# Patient Record
Sex: FEMALE | Race: WHITE | NOT HISPANIC OR LATINO | Employment: OTHER | URBAN - METROPOLITAN AREA
[De-identification: names, ages, dates, MRNs, and addresses within clinical notes are randomized per-mention and may not be internally consistent; named-entity substitution may affect disease eponyms.]

---

## 2017-01-05 PROBLEM — R60.9 EDEMA: Status: ACTIVE | Noted: 2017-01-05

## 2017-01-05 PROBLEM — I71.20 ANEURYSM OF THORACIC AORTA (HCC): Status: ACTIVE | Noted: 2017-01-05

## 2017-01-05 PROBLEM — N19 RENAL FAILURE: Status: ACTIVE | Noted: 2017-01-05

## 2017-01-05 PROBLEM — R53.83 FATIGUE: Status: ACTIVE | Noted: 2017-01-05

## 2017-01-05 PROBLEM — R94.31 ABNORMAL ELECTROCARDIOGRAM: Status: ACTIVE | Noted: 2017-01-05

## 2017-01-05 PROBLEM — R10.9 ABDOMINAL PAIN: Status: ACTIVE | Noted: 2017-01-05

## 2017-01-17 ENCOUNTER — APPOINTMENT (OUTPATIENT)
Dept: WOMENS IMAGING | Facility: HOSPITAL | Age: 72
End: 2017-01-17

## 2017-01-17 PROCEDURE — 77063 BREAST TOMOSYNTHESIS BI: CPT | Performed by: RADIOLOGY

## 2017-01-17 PROCEDURE — G0202 SCR MAMMO BI INCL CAD: HCPCS | Performed by: RADIOLOGY

## 2017-01-25 ENCOUNTER — OFFICE VISIT (OUTPATIENT)
Dept: CARDIOLOGY | Facility: CLINIC | Age: 72
End: 2017-01-25

## 2017-01-25 VITALS
BODY MASS INDEX: 32.79 KG/M2 | DIASTOLIC BLOOD PRESSURE: 70 MMHG | HEART RATE: 62 BPM | SYSTOLIC BLOOD PRESSURE: 128 MMHG | HEIGHT: 57 IN | WEIGHT: 152 LBS

## 2017-01-25 DIAGNOSIS — I10 ESSENTIAL HYPERTENSION: ICD-10-CM

## 2017-01-25 DIAGNOSIS — I71.20 THORACIC AORTIC ANEURYSM, WITHOUT RUPTURE: Primary | ICD-10-CM

## 2017-01-25 PROCEDURE — 93000 ELECTROCARDIOGRAM COMPLETE: CPT | Performed by: INTERNAL MEDICINE

## 2017-01-25 PROCEDURE — 99213 OFFICE O/P EST LOW 20 MIN: CPT | Performed by: INTERNAL MEDICINE

## 2017-01-25 NOTE — MR AVS SNAPSHOT
Bird MOLINA French   1/25/2017 11:20 AM   Office Visit    Dept Phone:  [phone]   Encounter #:  [id number]    Provider:  Giulia Squires MD   Department:  [facility] CARDIOLOG[facility]                Your Full Care Plan              Today's Medication Changes          These changes are accurate as of: 1/25/17 12:26 PM.  If you have any questions, ask your nurse or doctor.               Medication(s)that have changed:     CYMBALTA 60 MG capsule   Generic drug:  DULoxetine   Take 1 capsule by mouth.   What changed:  Another medication with the same name was removed. Continue taking this medication, and follow the directions you see here.   Changed by:  Costa South MD         Stop taking medication(s)listed here:     tamoxifen 20 MG chemo tablet   Commonly known as:  NOLVADEX   Stopped by:  Giulia Squires MD                      Your Updated Medication List          This list is accurate as of: 1/25/17 12:26 PM.  Always use your most recent med list.                amitriptyline 25 MG tablet   Commonly known as:  ELAVIL   TAKE ONE TABLET BY MOUTH AT BEDTIME AS NEEDED FOR SLEEP       aspirin 325 MG EC tablet       BENADRYL 25 MG tablet   Generic drug:  diphenhydrAMINE       CALTRATE 600 1500 (600 CA) MG tablet   Generic drug:  Calcium Carbonate       CoQ10 200 MG capsule       CYMBALTA 60 MG capsule   Generic drug:  DULoxetine       hydrochlorothiazide 12.5 MG tablet   Commonly known as:  HYDRODIURIL   Take 1 tablet by mouth daily.       HYDROcodone-acetaminophen  MG per tablet   Commonly known as:  NORCO   Take 1 tablet by mouth Every 6 (Six) Hours As Needed for moderate pain (4-6).       levothyroxine 125 MCG tablet   Commonly known as:  SYNTHROID, LEVOTHROID   Take 1 tablet by mouth daily.       LIVALO 2 MG tablet tablet   Generic drug:  pitavastatin calcium       metoprolol tartrate 25 MG tablet   Commonly known as:  LOPRESSOR   Take 1 tablet by mouth 2 (two) times a  "day.       Multi Vitamin tablet       omeprazole 20 MG capsule   Commonly known as:  priLOSEC   Take 1 capsule by mouth as needed (reflux).               We Performed the Following     CT chest wo contrast       You Were Diagnosed With        Codes Comments    Thoracic aortic aneurysm, without rupture    -  Primary ICD-10-CM: I71.2  ICD-9-CM: 441.2       Instructions     None    Patient Instructions History      Upcoming Appointments     Visit Type Date Time Department    FOLLOW UP 1/25/2017 11:20 AM [facility]    FOLLOW UP 2/28/2017 10:20 AM [facility]      [employer] Signup     Our records indicate that you have an active [employer] account.    You can view your After Visit Summary by going to [employer] and logging in with your [employer] username and password.  If you don't have a [employer] username and password but a parent or guardian has access to your record, the parent or guardian should login with their own [employer] username and password and access your record to view the After Visit Summary.    If you have questions, you can email [email or web address] or call [phone] to talk to our [employer] staff.  Remember, [employer] is NOT to be used for urgent needs.  For medical emergencies, dial 911.               Other Info from Your Visit           Your Appointments     Feb 28, 2017 10:20 AM EST   Follow Up with Costa South MD   [facility] (--)    [address]  [place] [address] [phone]           Arrive 15 minutes prior to appointment.              Allergies     No Known Allergies      Reason for Visit     Palpitations 1 year followup    Thoracic Aortic Aneurysm           Vital Signs     Blood Pressure Pulse Height Weight Body Mass Index Smoking Status    128/70 62 57\" (144.8 cm) 152 lb (68.9 kg) 32.89 kg/m2 Never Smoker      Problems and Diagnoses Noted     Aneurysm    -  Primary        "

## 2017-01-25 NOTE — PROGRESS NOTES
Date of Office Visit: 2017  Encounter Provider: Giulia Squires MD  Place of Service: [facility] CARDIOLOGY  Patient Name: Bird Spear  :1945    Chief complaint  Followup of thoracic aortic aneurysm and palpitations    History of Present Illness  Patient is a pleasant 71-year-old female with history of hypertension, hyperlipidemia, renal insufficiency, hypothyroidism.  In  she was found to have a mildly dilated aorta.  This is been followed with serial imaging studies.  She had an echocardiogram in 2016 that showed normal systolic function no significant valvular disease.  She had a nuclear stress test in 2014 that was negative for ischemia.  She had a noncontrast CT of her chest in 2015 that showed a slight increase in the ascending thoracic aortic size forms 3.7-3.9 cm.  Repeat CT imaging genera  with a noncontrast study again revealed a stable ectasia of the asymmetry thoracic aorta measuring 3.9 cm.  There was also some hepatic cyst and possible small hepatic hemangioma.    Last visit she states after stopping tamoxifen her palpitations and fatigue resolved completely.  She denies any chest pain palpitations syncope near syncope.  Her blood pressure has been with systolics in the 110s to 120s occasionally in the 130s diastolic 60s to 70s she is walking or riding a bicycle 45 times a week for 20 minutes.  Her breast cancer salt to be stable.    Past Medical History   Diagnosis Date   • Abdominal pain    • Abnormal electrocardiogram    • Arthritis 2016   • Chronic pain syndrome 2016   • Depression    • Edema    • Essential hypertension 2016   • Fatigue    • Hx of radiation therapy    • Hyperlipidemia 2016   • Hypertension    • Hypothyroidism    • Hypothyroidism (acquired) 2016   • Malignant neoplasm of breast    • Renal failure    • Thoracic aortic aneurysm    • Thyroid disease      Past Surgical History   Procedure  Laterality Date   • Spinal fusion     • Breast lumpectomy Right    • Hysterectomy     •  section       Outpatient Medications Prior to Visit   Medication Sig Dispense Refill   • amitriptyline (ELAVIL) 25 MG tablet TAKE ONE TABLET BY MOUTH AT BEDTIME AS NEEDED FOR SLEEP 90 tablet 0   • aspirin 325 MG EC tablet Take 325 mg by mouth Daily.     • Calcium Carbonate (CALTRATE 600) 1500 (600 CA) MG tablet Take  by mouth.     • Coenzyme Q10 (COQ10) 200 MG capsule Take 1 capsule by mouth Daily.     • diphenhydrAMINE (BENADRYL) 25 MG tablet Take 25 mg by mouth Daily.     • DULoxetine (CYMBALTA) 60 MG capsule Take 1 capsule by mouth.     • hydrochlorothiazide (HYDRODIURIL) 12.5 MG tablet Take 1 tablet by mouth daily. 90 tablet 3   • HYDROcodone-acetaminophen (NORCO)  MG per tablet Take 1 tablet by mouth Every 6 (Six) Hours As Needed for moderate pain (4-6). 120 tablet 0   • levothyroxine (SYNTHROID, LEVOTHROID) 125 MCG tablet Take 1 tablet by mouth daily. 90 tablet 3   • metoprolol tartrate (LOPRESSOR) 25 MG tablet Take 1 tablet by mouth 2 (two) times a day. 180 tablet 2   • Multiple Vitamin (MULTI VITAMIN) tablet Take  by mouth.     • omeprazole (PriLOSEC) 20 MG capsule Take 1 capsule by mouth as needed (reflux). 90 capsule 2   • pitavastatin calcium (LIVALO) 2 MG tablet tablet Take 2 mg by mouth every night.     • DULoxetine (CYMBALTA) 60 MG capsule Take 1 capsule by mouth daily. 90 capsule 2   • tamoxifen (NOLVADEX) 20 MG chemo tablet Take 1 tablet by mouth Daily.       No facility-administered medications prior to visit.      Allergies as of 2017   • (No Known Allergies)     Social History     Social History   • Marital status:      Spouse name: N/A   • Number of children: N/A   • Years of education: N/A     Occupational History   • Not on file.     Social History Main Topics   • Smoking status: Never Smoker   • Smokeless tobacco: Not on file   • Alcohol use No      Comment: caffeine use   • Drug  "use: Not on file   • Sexual activity: Not on file     Other Topics Concern   • Not on file     Social History Narrative     Family History   Problem Relation Age of Onset   • Heart disease Mother    • Hyperlipidemia Mother    • Diabetes Mother    • Hypertension Mother    • Heart disease Father      Coronary artery disease   • Hyperlipidemia Father    • Hypertension Father    • Heart disease Sister    • Hypertension Other      Review of Systems   Constitution: Negative for fever, malaise/fatigue, weight gain and weight loss.   HENT: Negative for ear pain, hearing loss, nosebleeds and sore throat.    Eyes: Negative for double vision, pain, vision loss in left eye and vision loss in right eye.   Cardiovascular:        See history of present illness.   Respiratory: Negative for cough, shortness of breath, sleep disturbances due to breathing, snoring and wheezing.    Endocrine: Negative for cold intolerance, heat intolerance and polyuria.   Skin: Negative for itching, poor wound healing and rash.   Musculoskeletal: Positive for joint pain and myalgias. Negative for joint swelling.   Gastrointestinal: Negative for abdominal pain, diarrhea, hematochezia, nausea and vomiting.   Genitourinary: Negative for hematuria and hesitancy.   Neurological: Negative for numbness, paresthesias and seizures.   Psychiatric/Behavioral: Positive for depression. The patient is not nervous/anxious.      Objective:     Vitals:    01/25/17 1133   BP: 128/70   Pulse: 62   Weight: 152 lb (68.9 kg)   Height: 57\" (144.8 cm)     Body mass index is 32.89 kg/(m^2).    Physical Exam   Constitutional: She is oriented to person, place, and time. She appears well-developed and well-nourished.   Obese   HENT:   Head: Normocephalic.   Nose: Nose normal.   Mouth/Throat: Oropharynx is clear and moist.   Eyes: Conjunctivae and EOM are normal. Pupils are equal, round, and reactive to light. Right eye exhibits no discharge. No scleral icterus.   Neck: Normal " range of motion. Neck supple. No JVD present. No thyromegaly present.   Cardiovascular: Normal rate, regular rhythm, normal heart sounds and intact distal pulses.  Exam reveals no gallop and no friction rub.    No murmur heard.  Pulses:       Carotid pulses are 2+ on the right side, and 2+ on the left side.       Radial pulses are 2+ on the right side, and 2+ on the left side.        Femoral pulses are 2+ on the right side, and 2+ on the left side.       Popliteal pulses are 2+ on the right side, and 2+ on the left side.        Dorsalis pedis pulses are 2+ on the right side, and 2+ on the left side.        Posterior tibial pulses are 2+ on the right side, and 2+ on the left side.   Pulmonary/Chest: Effort normal and breath sounds normal. No respiratory distress. She has no wheezes. She has no rales.   Abdominal: Soft. Bowel sounds are normal. She exhibits no distension. There is no hepatosplenomegaly. There is no tenderness. There is no rebound.   Musculoskeletal: Normal range of motion. She exhibits no edema or tenderness.   Neurological: She is alert and oriented to person, place, and time.   Skin: Skin is warm and dry. No rash noted. No erythema.   Psychiatric: She has a normal mood and affect. Her behavior is normal. Judgment and thought content normal.   Vitals reviewed.    Lab Review:     ECG 12 Lead  Date/Time: 1/25/2017 12:55 AM  Performed by: SARAH WONG  Authorized by: SARAH WONG   Comparison: compared with previous ECG   Similar to previous ECG  Rhythm: sinus rhythm  Conduction comments: Nonspecific ST T wave changes  QTc =427 msec  Clinical impression: abnormal ECG          Assessment:       Diagnosis Plan   1. Thoracic aortic aneurysm, without rupture  CT chest wo contrast   2. Essential hypertension       Plan:       1.  Ascending thoracic aortic aneurysm with ectasia.  We'll check a noncontrast CT scan of her chest.  2.  Hypertension, controlled  3.  Breast cancer  4.  Renal insufficiency  5.  Liver  abnormalities.  Possibly cyst and hemangioma.  She states this is been there for a while.  We'll defer further imaging evaluation to Dr. South  6.  Dyslipidemia       French, Bird MOLINA   Home Medication Instructions ANDREI:    Printed on:01/30/17 4153   Medication Information                      amitriptyline (ELAVIL) 25 MG tablet  TAKE ONE TABLET BY MOUTH AT BEDTIME AS NEEDED FOR SLEEP             aspirin 325 MG EC tablet  Take 325 mg by mouth Daily.             Calcium Carbonate (CALTRATE 600) 1500 (600 CA) MG tablet  Take  by mouth.             Coenzyme Q10 (COQ10) 200 MG capsule  Take 1 capsule by mouth Daily.             diphenhydrAMINE (BENADRYL) 25 MG tablet  Take 25 mg by mouth Daily.             DULoxetine (CYMBALTA) 60 MG capsule  Take 1 capsule by mouth.             hydrochlorothiazide (HYDRODIURIL) 12.5 MG tablet  Take 1 tablet by mouth daily.             HYDROcodone-acetaminophen (NORCO)  MG per tablet  Take 1 tablet by mouth Every 6 (Six) Hours As Needed for moderate pain (4-6).             levothyroxine (SYNTHROID, LEVOTHROID) 125 MCG tablet  Take 1 tablet by mouth daily.             metoprolol tartrate (LOPRESSOR) 25 MG tablet  Take 1 tablet by mouth 2 (two) times a day.             Multiple Vitamin (MULTI VITAMIN) tablet  Take  by mouth.             omeprazole (PriLOSEC) 20 MG capsule  Take 1 capsule by mouth as needed (reflux).             pitavastatin calcium (LIVALO) 2 MG tablet tablet  Take 2 mg by mouth every night.                 EMR Dragon/Transcription disclaimer:   Much of this encounter note is an electronic transcription/translation of spoken language to printed text. The electronic translation of spoken language may permit erroneous, or at times, nonsensical words or phrases to be inadvertently transcribed; Although I have reviewed the note for such errors, some may still exist.

## 2017-01-30 PROBLEM — I71.20 THORACIC AORTIC ANEURYSM, WITHOUT RUPTURE: Status: ACTIVE | Noted: 2017-01-30

## 2017-01-31 ENCOUNTER — HOSPITAL ENCOUNTER (OUTPATIENT)
Dept: CT IMAGING | Facility: HOSPITAL | Age: 72
Discharge: HOME | End: 2017-01-31
Attending: INTERNAL MEDICINE | Admitting: INTERNAL MEDICINE

## 2017-01-31 PROCEDURE — 71250 CT THORAX DX C-: CPT

## 2017-02-01 ENCOUNTER — TELEPHONE (OUTPATIENT)
Dept: CARDIOLOGY | Facility: CLINIC | Age: 72
End: 2017-02-01

## 2017-02-28 ENCOUNTER — OFFICE VISIT (OUTPATIENT)
Dept: INTERNAL MEDICINE | Facility: CLINIC | Age: 72
End: 2017-02-28

## 2017-02-28 VITALS
RESPIRATION RATE: 16 BRPM | HEART RATE: 60 BPM | SYSTOLIC BLOOD PRESSURE: 132 MMHG | BODY MASS INDEX: 32.36 KG/M2 | DIASTOLIC BLOOD PRESSURE: 87 MMHG | WEIGHT: 150 LBS | HEIGHT: 57 IN | OXYGEN SATURATION: 98 %

## 2017-02-28 DIAGNOSIS — E78.2 MIXED HYPERLIPIDEMIA: ICD-10-CM

## 2017-02-28 DIAGNOSIS — M19.90 ARTHRITIS: ICD-10-CM

## 2017-02-28 DIAGNOSIS — G47.00 INSOMNIA, UNSPECIFIED TYPE: ICD-10-CM

## 2017-02-28 DIAGNOSIS — I10 ESSENTIAL HYPERTENSION: Primary | ICD-10-CM

## 2017-02-28 LAB
BILIRUB BLD-MCNC: NEGATIVE MG/DL
CLARITY, POC: CLEAR
COLOR UR: YELLOW
GLUCOSE UR STRIP-MCNC: NEGATIVE MG/DL
KETONES UR QL: NEGATIVE
LEUKOCYTE EST, POC: NEGATIVE
NITRITE UR-MCNC: NEGATIVE MG/ML
PH UR: 7 [PH] (ref 5–8)
PROT UR STRIP-MCNC: NEGATIVE MG/DL
RBC # UR STRIP: ABNORMAL /UL
SP GR UR: 1.02 (ref 1–1.03)
UROBILINOGEN UR QL: NORMAL

## 2017-02-28 PROCEDURE — 81003 URINALYSIS AUTO W/O SCOPE: CPT | Performed by: INTERNAL MEDICINE

## 2017-02-28 PROCEDURE — 99213 OFFICE O/P EST LOW 20 MIN: CPT | Performed by: INTERNAL MEDICINE

## 2017-02-28 RX ORDER — DULOXETIN HYDROCHLORIDE 60 MG/1
60 CAPSULE, DELAYED RELEASE ORAL DAILY
Qty: 90 CAPSULE | Refills: 2 | Status: SHIPPED | OUTPATIENT
Start: 2017-02-28 | End: 2017-11-06 | Stop reason: SDUPTHER

## 2017-02-28 RX ORDER — AMITRIPTYLINE HYDROCHLORIDE 25 MG/1
25 TABLET, FILM COATED ORAL NIGHTLY
Qty: 90 TABLET | Refills: 2 | Status: SHIPPED | OUTPATIENT
Start: 2017-02-28 | End: 2017-11-29 | Stop reason: SDUPTHER

## 2017-02-28 RX ORDER — HYDROCODONE BITARTRATE AND ACETAMINOPHEN 10; 325 MG/1; MG/1
1 TABLET ORAL EVERY 6 HOURS PRN
Qty: 120 TABLET | Refills: 0 | Status: SHIPPED | OUTPATIENT
Start: 2017-02-28 | End: 2017-05-23 | Stop reason: SDUPTHER

## 2017-02-28 NOTE — PROGRESS NOTES
Subjective   Bird Spear is a 71 y.o. female.     History of Present Illness   70 yo female with pmhx depression doing well with agitation control on her cymbalta.  Her husbands feedback is to continue cymbalta.  She is better with controlling emotion on her cymbalta.  BP log today reviewed, but getting close to 100/60 and getting very fatigued.   Taking norco - 1/2 in am and second at 2 pm.  Often does not need but a fourth prior to bed for her back pain. She has trouble getting comfortable.     Her DCIS treated in 2014.  Did not tolerate tamoxifen.      patint provided documentation today from 1993 - Dr. Nelson regarding her elavil use.     Have stopped PAP - had pelvic with lori Shelby.  Did see Dr. Squires who is monitoring her thoracic aneurysm - we discussed hepatic cyst and renal scarring, fu plans for these findings which are likely benign but need monitoring.   The following portions of the patient's history were reviewed and updated as appropriate: allergies, current medications, past family history, past medical history, past social history, past surgical history and problem list.    Review of Systems   Constitutional: Negative for appetite change, fatigue, fever and unexpected weight change.   HENT: Negative for sinus pressure and trouble swallowing.    Respiratory: Negative for cough and chest tightness.    Cardiovascular: Negative for chest pain, palpitations and leg swelling.   Gastrointestinal: Negative for constipation and diarrhea.   Genitourinary: Negative for dysuria, frequency, hematuria, pelvic pain and vaginal discharge.   Musculoskeletal: Negative.    Skin: Negative.    Neurological: Negative for dizziness, light-headedness and headaches.   Hematological: Negative.    Psychiatric/Behavioral: Negative.        Objective   Physical Exam   Constitutional: She is oriented to person, place, and time. She appears well-developed and well-nourished.   HENT:   Head: Normocephalic and atraumatic.    Right Ear: External ear normal.   Left Ear: External ear normal.   Eyes: EOM are normal. Pupils are equal, round, and reactive to light.   Neck: Normal range of motion. Neck supple. No JVD present. No tracheal deviation present. No thyromegaly present.   Cardiovascular: Normal rate, regular rhythm and normal heart sounds.  Exam reveals no friction rub.    No murmur heard.  Pulmonary/Chest: Effort normal and breath sounds normal.   Lymphadenopathy:     She has no cervical adenopathy.   Neurological: She is alert and oriented to person, place, and time.   Skin: Skin is warm and dry.   Psychiatric: She has a normal mood and affect. Her behavior is normal.   Vitals reviewed.    UA today  Assessment/Plan      Bird was seen today for pain.    Diagnoses and all orders for this visit:    Essential hypertension    Arthritis  -     HYDROcodone-acetaminophen (NORCO)  MG per tablet; Take 1 tablet by mouth Every 6 (Six) Hours As Needed for moderate pain (4-6).  -     DULoxetine (CYMBALTA) 60 MG capsule; Take 1 capsule by mouth Daily.    Mixed hyperlipidemia    Insomnia, unspecified type  -     amitriptyline (ELAVIL) 25 MG tablet; Take 1 tablet by mouth Every Night.    mike and contract in chart.  Decrease metoprolol to 25 mg in morning and 12.5 mg at night      HM - GYN with  with Anuel  Renal cyst/liver cyst- UA today - 6 months renal ultraound, liver ultrasound for surveillance.     Insomnia - continue elavil.    FU with labs next visit.

## 2017-05-23 ENCOUNTER — OFFICE VISIT (OUTPATIENT)
Dept: INTERNAL MEDICINE | Facility: CLINIC | Age: 72
End: 2017-05-23

## 2017-05-23 VITALS
WEIGHT: 150.8 LBS | HEIGHT: 57 IN | BODY MASS INDEX: 32.53 KG/M2 | HEART RATE: 62 BPM | DIASTOLIC BLOOD PRESSURE: 90 MMHG | OXYGEN SATURATION: 97 % | SYSTOLIC BLOOD PRESSURE: 122 MMHG

## 2017-05-23 DIAGNOSIS — J40 BRONCHITIS: Primary | ICD-10-CM

## 2017-05-23 DIAGNOSIS — M19.90 ARTHRITIS: ICD-10-CM

## 2017-05-23 DIAGNOSIS — M48.061 SPINAL STENOSIS OF LUMBAR REGION: ICD-10-CM

## 2017-05-23 PROCEDURE — 94010 BREATHING CAPACITY TEST: CPT | Performed by: INTERNAL MEDICINE

## 2017-05-23 PROCEDURE — 99214 OFFICE O/P EST MOD 30 MIN: CPT | Performed by: INTERNAL MEDICINE

## 2017-05-23 RX ORDER — PREDNISONE 20 MG/1
20 TABLET ORAL DAILY
Qty: 6 TABLET | Refills: 0 | Status: SHIPPED | OUTPATIENT
Start: 2017-05-23 | End: 2017-05-23 | Stop reason: SDUPTHER

## 2017-05-23 RX ORDER — HYDROCODONE BITARTRATE AND ACETAMINOPHEN 10; 325 MG/1; MG/1
1 TABLET ORAL EVERY 6 HOURS PRN
Qty: 120 TABLET | Refills: 0 | Status: SHIPPED | OUTPATIENT
Start: 2017-05-23 | End: 2017-08-23 | Stop reason: SDUPTHER

## 2017-05-23 RX ORDER — AZITHROMYCIN 250 MG/1
TABLET, FILM COATED ORAL
Qty: 6 TABLET | Refills: 0 | Status: SHIPPED | OUTPATIENT
Start: 2017-05-23 | End: 2017-08-23

## 2017-05-23 RX ORDER — AZITHROMYCIN 250 MG/1
TABLET, FILM COATED ORAL
Qty: 6 TABLET | Refills: 0 | Status: SHIPPED | OUTPATIENT
Start: 2017-05-23 | End: 2017-05-23 | Stop reason: SDUPTHER

## 2017-05-23 RX ORDER — PREDNISONE 20 MG/1
20 TABLET ORAL DAILY
Qty: 6 TABLET | Refills: 0 | Status: SHIPPED | OUTPATIENT
Start: 2017-05-23 | End: 2017-11-29

## 2017-05-30 DIAGNOSIS — E03.9 HYPOTHYROIDISM (ACQUIRED): ICD-10-CM

## 2017-05-30 DIAGNOSIS — I10 ESSENTIAL HYPERTENSION: ICD-10-CM

## 2017-05-30 RX ORDER — LEVOTHYROXINE SODIUM 0.12 MG/1
TABLET ORAL
Qty: 90 TABLET | Refills: 1 | Status: SHIPPED | OUTPATIENT
Start: 2017-05-30 | End: 2017-11-29 | Stop reason: SDUPTHER

## 2017-05-30 RX ORDER — HYDROCHLOROTHIAZIDE 12.5 MG/1
12.5 TABLET ORAL DAILY
Qty: 90 TABLET | Refills: 3 | Status: SHIPPED | OUTPATIENT
Start: 2017-05-30 | End: 2017-11-29 | Stop reason: SDUPTHER

## 2017-05-30 RX ORDER — HYDROCHLOROTHIAZIDE 12.5 MG/1
CAPSULE, GELATIN COATED ORAL
Qty: 90 CAPSULE | Refills: 1 | Status: SHIPPED | OUTPATIENT
Start: 2017-05-30 | End: 2017-05-30

## 2017-05-30 RX ORDER — LEVOTHYROXINE SODIUM 0.12 MG/1
TABLET ORAL
Qty: 90 TABLET | Refills: 1 | Status: SHIPPED | OUTPATIENT
Start: 2017-05-30 | End: 2017-05-30 | Stop reason: SDUPTHER

## 2017-08-23 ENCOUNTER — OFFICE VISIT (OUTPATIENT)
Dept: INTERNAL MEDICINE | Facility: CLINIC | Age: 72
End: 2017-08-23

## 2017-08-23 VITALS
WEIGHT: 149 LBS | HEART RATE: 63 BPM | RESPIRATION RATE: 16 BRPM | OXYGEN SATURATION: 98 % | BODY MASS INDEX: 32.15 KG/M2 | HEIGHT: 57 IN | DIASTOLIC BLOOD PRESSURE: 84 MMHG | SYSTOLIC BLOOD PRESSURE: 136 MMHG

## 2017-08-23 DIAGNOSIS — I71.20 THORACIC AORTIC ANEURYSM WITHOUT RUPTURE (HCC): ICD-10-CM

## 2017-08-23 DIAGNOSIS — M19.90 ARTHRITIS: ICD-10-CM

## 2017-08-23 DIAGNOSIS — E03.9 HYPOTHYROIDISM, UNSPECIFIED TYPE: ICD-10-CM

## 2017-08-23 DIAGNOSIS — R31.9 HEMATURIA: ICD-10-CM

## 2017-08-23 DIAGNOSIS — E78.2 MIXED HYPERLIPIDEMIA: Primary | ICD-10-CM

## 2017-08-23 DIAGNOSIS — I10 ESSENTIAL HYPERTENSION: ICD-10-CM

## 2017-08-23 DIAGNOSIS — G47.00 INSOMNIA, UNSPECIFIED TYPE: ICD-10-CM

## 2017-08-23 DIAGNOSIS — M48.061 SPINAL STENOSIS OF LUMBAR REGION: ICD-10-CM

## 2017-08-23 LAB
BASOPHILS # BLD AUTO: 0.03 10*3/MM3 (ref 0–0.2)
BASOPHILS NFR BLD AUTO: 0.8 % (ref 0–2)
CHOLEST SERPL-MCNC: 212 MG/DL (ref 0–200)
CHOLEST/HDLC SERPL: 2.9 {RATIO}
EOSINOPHIL # BLD AUTO: 0.12 10*3/MM3 (ref 0.1–0.3)
EOSINOPHIL NFR BLD AUTO: 3.1 % (ref 0–4)
ERYTHROCYTE [DISTWIDTH] IN BLOOD BY AUTOMATED COUNT: 13.3 % (ref 11.5–14.5)
HCT VFR BLD AUTO: 37.8 % (ref 37–47)
HDLC SERPL-MCNC: 73 MG/DL (ref 40–60)
HGB BLD-MCNC: 12.2 G/DL (ref 12–16)
IMM GRANULOCYTES # BLD: 0.01 10*3/MM3 (ref 0–0.03)
IMM GRANULOCYTES NFR BLD: 0.3 % (ref 0–0.5)
LDLC SERPL CALC-MCNC: 124 MG/DL (ref 0–100)
LYMPHOCYTES # BLD AUTO: 0.84 10*3/MM3 (ref 0.6–4.8)
LYMPHOCYTES NFR BLD AUTO: 22 % (ref 20–45)
MCH RBC QN AUTO: 29.9 PG (ref 27–31)
MCHC RBC AUTO-ENTMCNC: 32.3 G/DL (ref 31–37)
MCV RBC AUTO: 92.6 FL (ref 81–99)
MONOCYTES # BLD AUTO: 0.39 10*3/MM3 (ref 0–1)
MONOCYTES NFR BLD AUTO: 10.2 % (ref 3–8)
NEUTROPHILS # BLD AUTO: 2.42 10*3/MM3 (ref 1.5–8.3)
NEUTROPHILS NFR BLD AUTO: 63.6 % (ref 45–70)
NRBC BLD AUTO-RTO: 0 /100 WBC (ref 0–0)
PLATELET # BLD AUTO: 268 10*3/MM3 (ref 140–500)
RBC # BLD AUTO: 4.08 10*6/MM3 (ref 4.2–5.4)
TRIGL SERPL-MCNC: 76 MG/DL (ref 0–150)
TSH SERPL DL<=0.005 MIU/L-ACNC: 0.14 MIU/ML (ref 0.27–4.2)
VLDLC SERPL CALC-MCNC: 15.2 MG/DL (ref 7–27)
WBC # BLD AUTO: 3.81 10*3/MM3 (ref 4.8–10.8)

## 2017-08-23 PROCEDURE — 99214 OFFICE O/P EST MOD 30 MIN: CPT | Performed by: INTERNAL MEDICINE

## 2017-08-23 RX ORDER — HYDROCODONE BITARTRATE AND ACETAMINOPHEN 10; 325 MG/1; MG/1
1 TABLET ORAL EVERY 6 HOURS PRN
Qty: 120 TABLET | Refills: 0 | Status: SHIPPED | OUTPATIENT
Start: 2017-08-23 | End: 2017-11-29 | Stop reason: SDUPTHER

## 2017-08-23 NOTE — PROGRESS NOTES
Subjective   Bird Spear is a 71 y.o. female.     History of Present Illness   70 yo female with pmhx trace hematuria.  Has urethral polyp that will occasionally bleed.    She has stable HTN, no chest pain or headache.      Does report ffrontal headache that is dull and R sided behind her eye. Minimal severity.  Did get a bleed in her R eye.      She denies any change in her back pain. Using norco 2-3 times per day.  She uses benadryl prn for her headache. Her insomnia is better with elavil, but does have headache, has to urinate at night on occasion.    Under thoracic aneurysm surveillance with PRINCE Bazan in January.    The following portions of the patient's history were reviewed and updated as appropriate: allergies, current medications, past family history, past medical history, past social history, past surgical history and problem list.    Review of Systems   Constitutional: Negative.  Negative for fatigue.   Respiratory: Negative.  Negative for cough.    Cardiovascular: Negative.  Negative for chest pain and palpitations.   Genitourinary: Negative.    Musculoskeletal: Negative.    Neurological: Positive for headaches.        R frontal, periorbital   Psychiatric/Behavioral: Positive for sleep disturbance.        Stable   All other systems reviewed and are negative.      Objective   Physical Exam   Constitutional: She is oriented to person, place, and time. She appears well-developed and well-nourished.   HENT:   Head: Normocephalic and atraumatic.   Eyes: EOM are normal. Pupils are equal, round, and reactive to light. Right eye exhibits no discharge. Left eye exhibits no discharge.   Cardiovascular: Normal rate and regular rhythm.    No murmur heard.  Pulmonary/Chest: Effort normal and breath sounds normal. No respiratory distress.   Abdominal: Soft. She exhibits no distension.   Neurological: She is alert and oriented to person, place, and time.   Skin: Skin is warm and dry.   Psychiatric: She has a  normal mood and affect. Her behavior is normal.   Nursing note and vitals reviewed.  2/28/17 - trace blood, repeat today negative!    Assessment/Plan   Bird was seen today for back pain.    Diagnoses and all orders for this visit:    Mixed hyperlipidemia    Essential hypertension    Hypothyroidism, unspecified type    Spinal stenosis of lumbar region    Hematuria    Insomnia, unspecified type    Arthritis  -     HYDROcodone-acetaminophen (NORCO)  MG per tablet; Take 1 tablet by mouth Every 6 (Six) Hours As Needed for Moderate Pain .    Thoracic aortic aneurysm without rupture      Check her lipid and thyroid today.  FU 3months.  She is stable at this time. I did discuss that elavil is also headache medication should it help her.   Delmar and contract utd  Flu shot in [place]  FU with cardiology as scheduled.

## 2017-08-23 NOTE — PATIENT INSTRUCTIONS
Diagnoses and all orders for this visit:    Mixed hyperlipidemia    Essential hypertension    Hypothyroidism, unspecified type    Spinal stenosis of lumbar region    Hematuria    Insomnia, unspecified type    Arthritis  -     HYDROcodone-acetaminophen (NORCO)  MG per tablet; Take 1 tablet by mouth Every 6 (Six) Hours As Needed for Moderate Pain .    Thoracic aortic aneurysm without rupture      Check her lipid and thyroid today.  FU 3months.  She is stable at this time. I did discuss that elavil is also headache medication should it help her.   Delmar and contract utd  Flu shot in [place] FU with cardiology as scheduled

## 2017-08-28 ENCOUNTER — TELEPHONE (OUTPATIENT)
Dept: INTERNAL MEDICINE | Facility: CLINIC | Age: 72
End: 2017-08-28

## 2017-08-28 NOTE — TELEPHONE ENCOUNTER
LVM with details.    ----- Message from Costa South MD sent at 8/28/2017  8:30 AM EDT -----  Labs are stable. Chol great.  Has slight suppressed white count that is stable. Will recheck 6 months

## 2017-11-06 DIAGNOSIS — M19.90 ARTHRITIS: ICD-10-CM

## 2017-11-06 RX ORDER — DULOXETIN HYDROCHLORIDE 60 MG/1
CAPSULE, DELAYED RELEASE ORAL
Qty: 90 CAPSULE | Refills: 2 | Status: SHIPPED | OUTPATIENT
Start: 2017-11-06 | End: 2018-06-14 | Stop reason: SDUPTHER

## 2017-11-29 ENCOUNTER — OFFICE VISIT (OUTPATIENT)
Dept: INTERNAL MEDICINE | Facility: CLINIC | Age: 72
End: 2017-11-29

## 2017-11-29 VITALS
SYSTOLIC BLOOD PRESSURE: 118 MMHG | HEART RATE: 68 BPM | DIASTOLIC BLOOD PRESSURE: 82 MMHG | OXYGEN SATURATION: 98 % | WEIGHT: 153 LBS | BODY MASS INDEX: 33.01 KG/M2 | HEIGHT: 57 IN

## 2017-11-29 DIAGNOSIS — M19.90 ARTHRITIS: ICD-10-CM

## 2017-11-29 DIAGNOSIS — G89.4 CHRONIC PAIN SYNDROME: ICD-10-CM

## 2017-11-29 DIAGNOSIS — E78.2 MIXED HYPERLIPIDEMIA: Primary | ICD-10-CM

## 2017-11-29 DIAGNOSIS — I10 ESSENTIAL HYPERTENSION: ICD-10-CM

## 2017-11-29 DIAGNOSIS — E03.9 HYPOTHYROIDISM (ACQUIRED): ICD-10-CM

## 2017-11-29 DIAGNOSIS — R82.998 LEUKOCYTES IN URINE: ICD-10-CM

## 2017-11-29 DIAGNOSIS — G47.00 INSOMNIA, UNSPECIFIED TYPE: ICD-10-CM

## 2017-11-29 DIAGNOSIS — R31.9 HEMATURIA, UNSPECIFIED TYPE: ICD-10-CM

## 2017-11-29 DIAGNOSIS — Z78.0 MENOPAUSE: ICD-10-CM

## 2017-11-29 LAB
BILIRUB BLD-MCNC: NEGATIVE MG/DL
CLARITY, POC: CLEAR
COLOR UR: YELLOW
GLUCOSE UR STRIP-MCNC: NEGATIVE MG/DL
KETONES UR QL: NEGATIVE
LEUKOCYTE EST, POC: ABNORMAL
NITRITE UR-MCNC: NEGATIVE MG/ML
PH UR: 8.5 [PH] (ref 5–8)
PROT UR STRIP-MCNC: NEGATIVE MG/DL
RBC # UR STRIP: ABNORMAL /UL
SP GR UR: 1.01 (ref 1–1.03)
UROBILINOGEN UR QL: NORMAL

## 2017-11-29 PROCEDURE — 99214 OFFICE O/P EST MOD 30 MIN: CPT | Performed by: INTERNAL MEDICINE

## 2017-11-29 PROCEDURE — 81003 URINALYSIS AUTO W/O SCOPE: CPT | Performed by: INTERNAL MEDICINE

## 2017-11-29 RX ORDER — LEVOTHYROXINE SODIUM 0.12 MG/1
TABLET ORAL
Qty: 90 TABLET | Refills: 1 | Status: SHIPPED | OUTPATIENT
Start: 2017-11-29 | End: 2018-09-02 | Stop reason: SDUPTHER

## 2017-11-29 RX ORDER — HYDROCODONE BITARTRATE AND ACETAMINOPHEN 10; 325 MG/1; MG/1
1 TABLET ORAL EVERY 6 HOURS PRN
Qty: 120 TABLET | Refills: 0 | Status: SHIPPED | OUTPATIENT
Start: 2017-11-29 | End: 2018-03-14 | Stop reason: SDUPTHER

## 2017-11-29 RX ORDER — AMITRIPTYLINE HYDROCHLORIDE 25 MG/1
25 TABLET, FILM COATED ORAL NIGHTLY
Qty: 90 TABLET | Refills: 2 | Status: SHIPPED | OUTPATIENT
Start: 2017-11-29 | End: 2018-09-02 | Stop reason: SDUPTHER

## 2017-11-29 RX ORDER — HYDROCHLOROTHIAZIDE 12.5 MG/1
12.5 TABLET ORAL DAILY
Qty: 90 TABLET | Refills: 3 | Status: SHIPPED | OUTPATIENT
Start: 2017-11-29 | End: 2018-05-25 | Stop reason: DRUGHIGH

## 2017-11-29 RX ORDER — LOVASTATIN 20 MG/1
20 TABLET ORAL NIGHTLY
Qty: 90 TABLET | Refills: 2
Start: 2017-11-29 | End: 2019-11-25

## 2017-11-29 NOTE — PATIENT INSTRUCTIONS
10/15 - lumbar and hip x ray without acute finding, hardware in place.    Assessment/Plan   Bird was seen today for follow-up and back pain.    Diagnoses and all orders for this visit:    Mixed hyperlipidemia  -     lovastatin (MEVACOR) 20 MG tablet; Take 1 tablet by mouth Every Night.    Insomnia, unspecified type  -     amitriptyline (ELAVIL) 25 MG tablet; Take 1 tablet by mouth Every Night.    Hypothyroidism (acquired)  -     levothyroxine (SYNTHROID, LEVOTHROID) 125 MCG tablet; TAKE ONE PO DAILY    Essential hypertension  -     metoprolol tartrate (LOPRESSOR) 25 MG tablet; Take 1 tablet by mouth 2 (Two) Times a Day.  -     hydrochlorothiazide (HYDRODIURIL) 12.5 MG tablet; Take 1 tablet by mouth Daily.    Arthritis  -     HYDROcodone-acetaminophen (NORCO)  MG per tablet; Take 1 tablet by mouth Every 6 (Six) Hours As Needed for Moderate Pain .    Chronic pain syndrome    Hematuria, unspecified type

## 2017-11-29 NOTE — PROGRESS NOTES
Subjective     Bird Spear is a 72 y.o. female, who presents with a chief complaint of   Chief Complaint   Patient presents with   • Follow-up     3 month f/u    • Back Pain       HPI     73 yo female with PMHx HTN, HL, hypothyroidism    Her fatigue is worse when does not take her norco.  Her R hip is still stiff. Has lumbar fusino in past. Gets worse when doing yard work.  Had episode where overdid it in yard in August and had to use a walker. First few steps really bother her.  She never has stiffness more than 15 min. She is really not wanting further work up at this one.     Currently on livalo but asking for alternative due to cost.  She tolerated both lovastatin and simvastatin in past with only mild myalgia.  Still has some lovastatin at home.     She is reporting stable mood on her cymbalta.      The following portions of the patient's history were reviewed and updated as appropriate: allergies, current medications, past family history, past medical history, past social history, past surgical history and problem list.    Allergies: Review of patient's allergies indicates no known allergies.    Current Outpatient Prescriptions:   •  amitriptyline (ELAVIL) 25 MG tablet, Take 1 tablet by mouth Every Night., Disp: 90 tablet, Rfl: 2  •  aspirin 325 MG EC tablet, Take 325 mg by mouth Daily., Disp: , Rfl:   •  Calcium Carbonate (CALTRATE 600) 1500 (600 CA) MG tablet, Take  by mouth., Disp: , Rfl:   •  Coenzyme Q10 (COQ10) 200 MG capsule, Take 1 capsule by mouth Daily., Disp: , Rfl:   •  diphenhydrAMINE (BENADRYL) 25 MG tablet, Take 25 mg by mouth Daily., Disp: , Rfl:   •  DULoxetine (CYMBALTA) 60 MG capsule, TAKE 1 CAPSULE DAILY, Disp: 90 capsule, Rfl: 2  •  hydrochlorothiazide (HYDRODIURIL) 12.5 MG tablet, Take 1 tablet by mouth Daily., Disp: 90 tablet, Rfl: 3  •  HYDROcodone-acetaminophen (NORCO)  MG per tablet, Take 1 tablet by mouth Every 6 (Six) Hours As Needed for Moderate Pain ., Disp: 120 tablet,  "Rfl: 0  •  levothyroxine (SYNTHROID, LEVOTHROID) 125 MCG tablet, TAKE ONE PO DAILY, Disp: 90 tablet, Rfl: 1  •  metoprolol tartrate (LOPRESSOR) 25 MG tablet, Take 1 tablet by mouth 2 (Two) Times a Day., Disp: 180 tablet, Rfl: 2  •  Multiple Vitamin (MULTI VITAMIN) tablet, Take  by mouth., Disp: , Rfl:   •  omeprazole (PriLOSEC) 20 MG capsule, Take 1 capsule by mouth as needed (reflux)., Disp: 90 capsule, Rfl: 2  •  lovastatin (MEVACOR) 20 MG tablet, Take 1 tablet by mouth Every Night., Disp: 90 tablet, Rfl: 2  Medications Discontinued During This Encounter   Medication Reason   • predniSONE (DELTASONE) 20 MG tablet Therapy completed   • pitavastatin calcium (LIVALO) 2 MG tablet tablet    • amitriptyline (ELAVIL) 25 MG tablet Reorder   • levothyroxine (SYNTHROID, LEVOTHROID) 125 MCG tablet Reorder   • metoprolol tartrate (LOPRESSOR) 25 MG tablet Reorder   • hydrochlorothiazide (HYDRODIURIL) 12.5 MG tablet Reorder   • HYDROcodone-acetaminophen (NORCO)  MG per tablet Reorder       Review of Systems   Constitutional: Positive for fatigue. Negative for fever.   HENT: Negative.    Respiratory: Negative.    Gastrointestinal: Negative.    Genitourinary: Positive for hematuria.        Intermittent   Musculoskeletal: Positive for gait problem.        R hip stiffness   Hematological: Negative.    Psychiatric/Behavioral: Positive for sleep disturbance.        Requesting sleep med refill   All other systems reviewed and are negative.      Objective     /82 (BP Location: Left arm, Patient Position: Sitting, Cuff Size: Adult)  Pulse 68  Ht 57\" (144.8 cm)  Wt 153 lb (69.4 kg)  SpO2 98%  BMI 33.11 kg/m2      Physical Exam   Constitutional: She is oriented to person, place, and time. She appears well-developed and well-nourished.   HENT:   Head: Normocephalic and atraumatic.   Right Ear: External ear normal.   Left Ear: External ear normal.   Eyes: EOM are normal. Pupils are equal, round, and reactive to light. "   Neck: Normal range of motion. Neck supple. No tracheal deviation present. No thyromegaly present.   Cardiovascular: Normal rate, regular rhythm and normal heart sounds.    No murmur heard.  Pulmonary/Chest: Effort normal. No respiratory distress.   Musculoskeletal: She exhibits no edema.   Leans to the L but no pain with direct trochanteric pressure.  Walking narrow based   Neurological: She is alert and oriented to person, place, and time.   Skin: Skin is warm and dry.   Psychiatric: She has a normal mood and affect. Her behavior is normal.   Vitals reviewed.      Lab Results (most recent)     None      From [facility] - 10/17 reviewed. CEA 1.83, oncology with one year follow up.    Results for orders placed or performed in visit on 11/29/17   POCT urinalysis dipstick, automated   Result Value Ref Range    Color Yellow Yellow, Straw, Dark Yellow, Sara    Clarity, UA Clear Clear    Glucose, UA Negative Negative, 1000 mg/dL (3+) mg/dL    Bilirubin Negative Negative    Ketones, UA Negative Negative    Specific Gravity  1.015 1.005 - 1.030    Blood, UA Trace (A) Negative    pH, Urine 8.5 (A) 5.0 - 8.0    Protein, POC Negative Negative mg/dL    Urobilinogen, UA Normal Normal    Leukocytes Small (1+) (A) Negative    Nitrite, UA Negative Negative   10/15 - lumbar and hip x ray without acute finding, hardware in place.  UA clear today      Assessment/Plan   Bird was seen today for follow-up and back pain.    Diagnoses and all orders for this visit:    Mixed hyperlipidemia  -     lovastatin (MEVACOR) 20 MG tablet; Take 1 tablet by mouth Every Night.    Insomnia, unspecified type  -     amitriptyline (ELAVIL) 25 MG tablet; Take 1 tablet by mouth Every Night.    Hypothyroidism (acquired)  -     levothyroxine (SYNTHROID, LEVOTHROID) 125 MCG tablet; TAKE ONE PO DAILY    Essential hypertension  -     metoprolol tartrate (LOPRESSOR) 25 MG tablet; Take 1 tablet by mouth 2 (Two) Times a Day.  -     hydrochlorothiazide (HYDRODIURIL)  12.5 MG tablet; Take 1 tablet by mouth Daily.    Arthritis  -     HYDROcodone-acetaminophen (NORCO)  MG per tablet; Take 1 tablet by mouth Every 6 (Six) Hours As Needed for Moderate Pain .    Chronic pain syndrome, hip pain, will see Dr. Ceron with symptoms if get worse, declines at this time.     Hematuria, unspecified type    Repeat ua with reflex to culture today

## 2017-12-02 LAB
APPEARANCE UR: CLEAR
BACTERIA #/AREA URNS HPF: ABNORMAL /HPF
BACTERIA UR CULT: ABNORMAL
BACTERIA UR CULT: ABNORMAL
BILIRUB UR QL STRIP: NEGATIVE
COLOR UR: YELLOW
EPI CELLS #/AREA URNS HPF: ABNORMAL /HPF
GLUCOSE UR QL: NEGATIVE
HGB UR QL STRIP: NEGATIVE
KETONES UR QL STRIP: NEGATIVE
LEUKOCYTE ESTERASE UR QL STRIP: ABNORMAL
MICRO URNS: ABNORMAL
MUCOUS THREADS URNS QL MICRO: PRESENT /HPF
NITRITE UR QL STRIP: NEGATIVE
OTHER ANTIBIOTIC SUSC ISLT: ABNORMAL
PH UR STRIP: 8.5 [PH] (ref 5–7.5)
PROT UR QL STRIP: NEGATIVE
RBC #/AREA URNS HPF: ABNORMAL /HPF
SP GR UR: 1.01 (ref 1–1.03)
URINALYSIS REFLEX: ABNORMAL
UROBILINOGEN UR STRIP-MCNC: 0.2 MG/DL (ref 0.2–1)
WBC #/AREA URNS HPF: ABNORMAL /HPF

## 2017-12-06 ENCOUNTER — TELEPHONE (OUTPATIENT)
Dept: INTERNAL MEDICINE | Facility: CLINIC | Age: 72
End: 2017-12-06

## 2017-12-06 RX ORDER — NITROFURANTOIN 25; 75 MG/1; MG/1
100 CAPSULE ORAL 2 TIMES DAILY
Qty: 20 CAPSULE | Refills: 0 | Status: SHIPPED | OUTPATIENT
Start: 2017-12-06 | End: 2018-05-25 | Stop reason: ALTCHOICE

## 2017-12-06 NOTE — TELEPHONE ENCOUNTER
Patient has been advised of all results below in great detail. Patient voiced understanding.     ----- Message from Costa South MD sent at 12/5/2017  5:16 PM EST -----  Urine culture grew. Want her to take macrobid if she is willing, let me know to send in 100 bid for 10 days, then drop off urine here.

## 2017-12-13 ENCOUNTER — OFFICE VISIT (OUTPATIENT)
Dept: ORTHOPEDIC SURGERY | Facility: CLINIC | Age: 72
End: 2017-12-13

## 2017-12-13 VITALS
BODY MASS INDEX: 29.84 KG/M2 | DIASTOLIC BLOOD PRESSURE: 79 MMHG | HEIGHT: 59 IN | HEART RATE: 63 BPM | WEIGHT: 148 LBS | SYSTOLIC BLOOD PRESSURE: 121 MMHG

## 2017-12-13 DIAGNOSIS — M70.62 GREATER TROCHANTERIC BURSITIS OF BOTH HIPS: ICD-10-CM

## 2017-12-13 DIAGNOSIS — M67.911 TENDINOPATHY OF ROTATOR CUFF, RIGHT: ICD-10-CM

## 2017-12-13 DIAGNOSIS — R52 PAIN: Primary | ICD-10-CM

## 2017-12-13 DIAGNOSIS — M19.019 ACROMIOCLAVICULAR JOINT ARTHRITIS: ICD-10-CM

## 2017-12-13 DIAGNOSIS — M70.61 GREATER TROCHANTERIC BURSITIS OF BOTH HIPS: ICD-10-CM

## 2017-12-13 PROCEDURE — 20610 DRAIN/INJ JOINT/BURSA W/O US: CPT | Performed by: NURSE PRACTITIONER

## 2017-12-13 PROCEDURE — 73030 X-RAY EXAM OF SHOULDER: CPT | Performed by: NURSE PRACTITIONER

## 2017-12-13 PROCEDURE — 73502 X-RAY EXAM HIP UNI 2-3 VIEWS: CPT | Performed by: NURSE PRACTITIONER

## 2017-12-13 PROCEDURE — 99204 OFFICE O/P NEW MOD 45 MIN: CPT | Performed by: NURSE PRACTITIONER

## 2017-12-13 RX ORDER — LIDOCAINE HYDROCHLORIDE 10 MG/ML
2 INJECTION, SOLUTION EPIDURAL; INFILTRATION; INTRACAUDAL; PERINEURAL
Status: COMPLETED | OUTPATIENT
Start: 2017-12-13 | End: 2017-12-13

## 2017-12-13 RX ORDER — TRIAMCINOLONE ACETONIDE 40 MG/ML
80 INJECTION, SUSPENSION INTRA-ARTICULAR; INTRAMUSCULAR
Status: COMPLETED | OUTPATIENT
Start: 2017-12-13 | End: 2017-12-13

## 2017-12-13 RX ORDER — BUPIVACAINE HYDROCHLORIDE 5 MG/ML
2 INJECTION, SOLUTION EPIDURAL; INTRACAUDAL
Status: COMPLETED | OUTPATIENT
Start: 2017-12-13 | End: 2017-12-13

## 2017-12-13 RX ADMIN — BUPIVACAINE HYDROCHLORIDE 2 ML: 5 INJECTION, SOLUTION EPIDURAL; INTRACAUDAL at 14:06

## 2017-12-13 RX ADMIN — TRIAMCINOLONE ACETONIDE 80 MG: 40 INJECTION, SUSPENSION INTRA-ARTICULAR; INTRAMUSCULAR at 14:05

## 2017-12-13 RX ADMIN — BUPIVACAINE HYDROCHLORIDE 2 ML: 5 INJECTION, SOLUTION EPIDURAL; INTRACAUDAL at 14:05

## 2017-12-13 RX ADMIN — TRIAMCINOLONE ACETONIDE 80 MG: 40 INJECTION, SUSPENSION INTRA-ARTICULAR; INTRAMUSCULAR at 14:06

## 2017-12-13 RX ADMIN — LIDOCAINE HYDROCHLORIDE 2 ML: 10 INJECTION, SOLUTION EPIDURAL; INFILTRATION; INTRACAUDAL; PERINEURAL at 14:06

## 2017-12-13 RX ADMIN — LIDOCAINE HYDROCHLORIDE 2 ML: 10 INJECTION, SOLUTION EPIDURAL; INFILTRATION; INTRACAUDAL; PERINEURAL at 14:05

## 2017-12-13 NOTE — PROGRESS NOTES
Procedure   Large Joint Arthrocentesis  Date/Time: 12/13/2017 2:05 PM  Consent given by: patient  Site marked: site marked  Timeout: Immediately prior to procedure a time out was called to verify the correct patient, procedure, equipment, support staff and site/side marked as required   Supporting Documentation  Indications: pain   Procedure Details  Location: shoulder - R subacromial bursa  Preparation: Patient was prepped and draped in the usual sterile fashion  Needle size: 22 G  Approach: lateral  Medications administered: 2 mL lidocaine PF 1% 1 %; 2 mL bupivacaine (PF) 0.5 %; 80 mg triamcinolone acetonide 40 MG/ML  Patient tolerance: patient tolerated the procedure well with no immediate complications

## 2017-12-18 ENCOUNTER — CLINICAL SUPPORT (OUTPATIENT)
Dept: INTERNAL MEDICINE | Facility: CLINIC | Age: 72
End: 2017-12-18

## 2017-12-18 DIAGNOSIS — R31.9 HEMATURIA, UNSPECIFIED TYPE: Primary | ICD-10-CM

## 2017-12-18 LAB
BILIRUB BLD-MCNC: NEGATIVE MG/DL
CLARITY, POC: CLEAR
COLOR UR: YELLOW
GLUCOSE UR STRIP-MCNC: NEGATIVE MG/DL
KETONES UR QL: NEGATIVE
LEUKOCYTE EST, POC: NEGATIVE
NITRITE UR-MCNC: NEGATIVE MG/ML
PH UR: 5.6 [PH] (ref 5–8)
PROT UR STRIP-MCNC: ABNORMAL MG/DL
RBC # UR STRIP: ABNORMAL /UL
SP GR UR: 1 (ref 1–1.03)
UROBILINOGEN UR QL: NORMAL

## 2017-12-18 PROCEDURE — 81003 URINALYSIS AUTO W/O SCOPE: CPT | Performed by: INTERNAL MEDICINE

## 2018-01-24 ENCOUNTER — OFFICE VISIT (OUTPATIENT)
Dept: ORTHOPEDIC SURGERY | Facility: CLINIC | Age: 73
End: 2018-01-24

## 2018-01-24 VITALS — WEIGHT: 147 LBS | BODY MASS INDEX: 29.64 KG/M2 | HEIGHT: 59 IN

## 2018-01-24 DIAGNOSIS — M67.911 TENDINOPATHY OF ROTATOR CUFF, RIGHT: ICD-10-CM

## 2018-01-24 DIAGNOSIS — M19.019 ACROMIOCLAVICULAR JOINT ARTHRITIS: ICD-10-CM

## 2018-01-24 DIAGNOSIS — M70.62 GREATER TROCHANTERIC BURSITIS OF BOTH HIPS: Primary | ICD-10-CM

## 2018-01-24 DIAGNOSIS — M70.61 GREATER TROCHANTERIC BURSITIS OF BOTH HIPS: Primary | ICD-10-CM

## 2018-01-24 PROCEDURE — 99212 OFFICE O/P EST SF 10 MIN: CPT | Performed by: NURSE PRACTITIONER

## 2018-01-24 NOTE — PROGRESS NOTES
Subjective:     Patient ID: Bird Spear is a 72 y.o. female.    Chief Complaint: follow-up for greater trochanteric bursitis bilateral hips, tendinopathy rotator cuff, right shoulder    History of Present Illness  Bird Spear returns to clinic today for evaluation of bilateral hips and right shoulder. She was seen by this APRN on 12/13/2017, received corticosteroid injections at that time and reports that she is significantly better after receiving the injections. She does continue to experience mild discomfort at right shoulder with certain motions however for the most part she is pain free. Initially presented to this APRN with a two-year history of pain noted at bilateral hips.  Pain present at the lateral aspect of bilateral hips worse on the right side.  Reports that she had initially experienced pain after her back surgery which was in 2012 but really over the last 2 years has pain missing worse at hips.  Denies that she's had corticosteroid injections in the past that she has had x-rays of her hips at outside facility which were negative.  The imaging is not available for viewing.  She denies that her legs are giving out on her but has noticed the pain radiating at the right hip down the lateral aspect of her leg to her knee and onto her ankle.  Rates pain at a 6-7 out of a 10 aching and pulling in nature.  She notices pain when she is lying on either side at night and try to get comfortable which she is unable to do secondary pain.  She also experiences pain at the lateral aspect of her hips while sitting in a hard surface chair and for sitting for long periods of time and with abduction motions of both hips.  She has applied a heating pad as needed in bilateral hips however does not receive significant symptom relief with a heating pad.  Denies presence of numbness and tingling radiating down bilateral lower extremities.     She would also like to be seen for her right shoulder pain present over the  lateral posterior aspect of her shoulder radiates down inferiorly to her mid humerus.  Began noticing within the last 3 months after completing an increased amount of luis beans which included her lifting the jars up into the canner which was over her head.  She reports pain present when lying on her right shoulder at night and initially just began noticing the aching sensation intermittently however the last few months the pain has been constant aching without any symptom relief.  Denies previous x-rays at her right shoulder, denies previously receiving glucose steroid injections at her right shoulder in the past.  Increased pain noted with reaching up overhead and reaching back behind herself.  Denies that she's had any previous injury at her right shoulder in the past but she is right-hand dominant.  Denies presence of numbness and tingling radiating from shoulder to hand on her right upper extremity denies pain radiating up into the right side of her neck.  Denies other concerns present this time.     Social History     Occupational History   • Not on file.     Social History Main Topics   • Smoking status: Never Smoker   • Smokeless tobacco: Not on file   • Alcohol use No      Comment: caffeine use   • Drug use: Not on file   • Sexual activity: Not on file      Past Medical History:   Diagnosis Date   • Abdominal pain    • Abnormal electrocardiogram    • Arthritis 5/24/2016   • Arthritis of neck    • Chronic pain syndrome 5/24/2016   • Depression    • Edema    • Essential hypertension 5/24/2016   • Fatigue    • Hematuria 8/23/2017   • Hx of radiation therapy    • Hyperlipidemia 5/24/2016   • Hypertension    • Hypothyroidism    • Hypothyroidism (acquired) 5/24/2016   • Lumbosacral disc disease    • Malignant neoplasm of breast    • Renal failure    • Scoliosis    • Thoracic aortic aneurysm    • Thyroid disease      Past Surgical History:   Procedure Laterality Date   • BACK SURGERY     • BREAST LUMPECTOMY  "Right    •  SECTION     • HYSTERECTOMY     • SPINAL FUSION         Family History   Problem Relation Age of Onset   • Heart disease Mother    • Hyperlipidemia Mother    • Diabetes Mother    • Hypertension Mother    • Heart disease Father      Coronary artery disease   • Hyperlipidemia Father    • Hypertension Father    • Heart disease Sister    • Hypertension Other          Review of Systems   Constitutional: Negative for chills, diaphoresis, fever and unexpected weight change.   HENT: Negative for hearing loss, nosebleeds, sore throat and tinnitus.    Eyes: Negative for pain and visual disturbance.   Respiratory: Negative for cough, shortness of breath and wheezing.    Cardiovascular: Negative for chest pain and palpitations.   Gastrointestinal: Negative for abdominal pain, diarrhea, nausea and vomiting.   Endocrine: Negative for cold intolerance, heat intolerance and polydipsia.   Genitourinary: Negative for difficulty urinating, dysuria and hematuria.   Musculoskeletal: Positive for joint swelling and myalgias. Negative for arthralgias.   Skin: Negative for rash and wound.   Allergic/Immunologic: Negative for environmental allergies.   Neurological: Negative for dizziness, syncope and numbness.   Hematological: Does not bruise/bleed easily.   Psychiatric/Behavioral: Negative for dysphoric mood and sleep disturbance. The patient is not nervous/anxious.            Objective:  Physical Exam    General: No acute distress.  Eyes: conjunctiva clear; pupils equally round and reactive  ENT: external ears and nose atraumatic; oropharynx clear  CV: no peripheral edema  Resp: normal respiratory effort  Skin: no rashes or wounds; normal turgor  Psych: mood and affect appropriate; recent and remote memory intact    Vitals:    18 0922   Weight: 66.7 kg (147 lb)   Height: 149.9 cm (59\")     Last 2 weights    18  09   Weight: 66.7 kg (147 lb)     Body mass index is 29.69 kg/(m^2).     Ortho Exam      Right " Hip Exam      Tenderness   The patient is experiencing tenderness in the greater trochanter.     Range of Motion   Extension: 0   Flexion: 100   Abduction: 45   Adduction: 30      Tests   DINO: negative  Mario: positive  Fadir:  Negative FADIR test     Other   Erythema: absent  Scars: absent  Sensation: normal  Pulse: present        Left Hip Exam      Tenderness   The patient is experiencing tenderness in the greater trochanter.     Range of Motion   Extension: 0   Flexion: 100   Abduction: 45   Adduction: 30      Tests   DINO: negative  Mario: positive  Fadir:  Negative FADIR test     Other   Erythema: absent  Scars: absent  Sensation: normal  Pulse: present        Right Shoulder Exam      Tenderness   The patient is experiencing tenderness in the acromion.     Range of Motion   Forward Flexion: 180+   External Rotation: 50   Internal Rotation 0 degrees: L2      Muscle Strength   Internal Rotation: 4+/5   External Rotation: 5/5   Supraspinatus: 4+/5   Subscapularis: 4+/5   Biceps: 5/5      Tests   Apprehension: negative  Cross Arm: positive  Drop Arm: negative  Hawkin's test: positive  Impingement: positive  Sulcus: absent     Other   Erythema: absent  Scars: absent  Sensation: normal  Pulse: present     Comments:    Mildly positive empty can  negative Barrow's  negative Speed's  Mildly positive bear hug exam       Assessment:       1. Greater trochanteric bursitis of both hips    2. Tendinopathy of rotator cuff, right    3. Acromioclavicular joint arthritis          Plan:  1. Discussed plan of care with patient. Will plan to follow-up with her as needed. Patient verbalized understanding of all information and agrees with plan of care. Denies all other concerns present at this time.     Orders:  No orders of the defined types were placed in this encounter.      Medications:  No orders of the defined types were placed in this encounter.      Followup:  No Follow-up on file.    Bird was seen today for follow-up,  follow-up and follow-up.    Diagnoses and all orders for this visit:    Greater trochanteric bursitis of both hips    Tendinopathy of rotator cuff, right    Acromioclavicular joint arthritis        Dictated utilizing Dragon dictation

## 2018-01-25 ENCOUNTER — APPOINTMENT (OUTPATIENT)
Dept: WOMENS IMAGING | Facility: HOSPITAL | Age: 73
End: 2018-01-25

## 2018-01-25 PROCEDURE — 77067 SCR MAMMO BI INCL CAD: CPT | Performed by: RADIOLOGY

## 2018-01-25 PROCEDURE — 77063 BREAST TOMOSYNTHESIS BI: CPT | Performed by: RADIOLOGY

## 2018-02-06 ENCOUNTER — TELEPHONE (OUTPATIENT)
Dept: INTERNAL MEDICINE | Facility: CLINIC | Age: 73
End: 2018-02-06

## 2018-02-06 NOTE — TELEPHONE ENCOUNTER
Patient is aware      ----- Message from Costa South MD sent at 2/1/2018  4:27 PM EST -----  Mammogram negative repeat one year

## 2018-03-02 RX ORDER — HYDROCHLOROTHIAZIDE 12.5 MG/1
CAPSULE, GELATIN COATED ORAL
Qty: 90 CAPSULE | Refills: 1 | Status: SHIPPED | OUTPATIENT
Start: 2018-03-02 | End: 2018-09-02 | Stop reason: SDUPTHER

## 2018-03-14 ENCOUNTER — OFFICE VISIT (OUTPATIENT)
Dept: INTERNAL MEDICINE | Facility: CLINIC | Age: 73
End: 2018-03-14

## 2018-03-14 VITALS
HEART RATE: 71 BPM | OXYGEN SATURATION: 95 % | WEIGHT: 147 LBS | HEIGHT: 59 IN | SYSTOLIC BLOOD PRESSURE: 134 MMHG | BODY MASS INDEX: 29.64 KG/M2 | RESPIRATION RATE: 18 BRPM | TEMPERATURE: 98.6 F | DIASTOLIC BLOOD PRESSURE: 88 MMHG

## 2018-03-14 DIAGNOSIS — Z13.820 SCREENING FOR OSTEOPOROSIS: ICD-10-CM

## 2018-03-14 DIAGNOSIS — Z78.0 MENOPAUSE: Primary | ICD-10-CM

## 2018-03-14 DIAGNOSIS — E03.9 HYPOTHYROIDISM, UNSPECIFIED TYPE: ICD-10-CM

## 2018-03-14 DIAGNOSIS — M19.90 ARTHRITIS: ICD-10-CM

## 2018-03-14 DIAGNOSIS — I10 ESSENTIAL HYPERTENSION: Primary | ICD-10-CM

## 2018-03-14 DIAGNOSIS — Z78.0 MENOPAUSE: ICD-10-CM

## 2018-03-14 DIAGNOSIS — E78.2 MIXED HYPERLIPIDEMIA: ICD-10-CM

## 2018-03-14 LAB
ALBUMIN SERPL-MCNC: 4.5 G/DL (ref 3.5–5.2)
ALBUMIN/GLOB SERPL: 2 G/DL
ALP SERPL-CCNC: 58 U/L (ref 40–129)
ALT SERPL-CCNC: 19 U/L (ref 5–33)
AST SERPL-CCNC: 25 U/L (ref 5–32)
BASOPHILS # BLD AUTO: 0.04 10*3/MM3 (ref 0–0.2)
BASOPHILS NFR BLD AUTO: 0.8 % (ref 0–2)
BILIRUB SERPL-MCNC: 0.6 MG/DL (ref 0.2–1.2)
BUN SERPL-MCNC: 19 MG/DL (ref 8–23)
BUN/CREAT SERPL: 17.9 (ref 7–25)
CALCIUM SERPL-MCNC: 10.2 MG/DL (ref 8.8–10.5)
CHLORIDE SERPL-SCNC: 101 MMOL/L (ref 98–107)
CHOLEST SERPL-MCNC: 213 MG/DL (ref 0–200)
CHOLEST/HDLC SERPL: 2.66 {RATIO}
CO2 SERPL-SCNC: 32.2 MMOL/L (ref 22–29)
CREAT SERPL-MCNC: 1.06 MG/DL (ref 0.57–1)
EOSINOPHIL # BLD AUTO: 0.1 10*3/MM3 (ref 0.1–0.3)
EOSINOPHIL NFR BLD AUTO: 2 % (ref 0–4)
ERYTHROCYTE [DISTWIDTH] IN BLOOD BY AUTOMATED COUNT: 14.6 % (ref 11.5–14.5)
GFR SERPLBLD CREATININE-BSD FMLA CKD-EPI: 51 ML/MIN/1.73
GFR SERPLBLD CREATININE-BSD FMLA CKD-EPI: 62 ML/MIN/1.73
GLOBULIN SER CALC-MCNC: 2.3 GM/DL
GLUCOSE SERPL-MCNC: 98 MG/DL (ref 65–99)
HCT VFR BLD AUTO: 40.5 % (ref 37–47)
HDLC SERPL-MCNC: 80 MG/DL (ref 40–60)
HGB BLD-MCNC: 13.4 G/DL (ref 12–16)
IMM GRANULOCYTES # BLD: 0.01 10*3/MM3 (ref 0–0.03)
IMM GRANULOCYTES NFR BLD: 0.2 % (ref 0–0.5)
LDLC SERPL CALC-MCNC: 118 MG/DL (ref 0–100)
LYMPHOCYTES # BLD AUTO: 1.29 10*3/MM3 (ref 0.6–4.8)
LYMPHOCYTES NFR BLD AUTO: 26.3 % (ref 20–45)
MCH RBC QN AUTO: 32.2 PG (ref 27–31)
MCHC RBC AUTO-ENTMCNC: 33.1 G/DL (ref 31–37)
MCV RBC AUTO: 97.4 FL (ref 81–99)
MONOCYTES # BLD AUTO: 0.6 10*3/MM3 (ref 0–1)
MONOCYTES NFR BLD AUTO: 12.2 % (ref 3–8)
NEUTROPHILS # BLD AUTO: 2.86 10*3/MM3 (ref 1.5–8.3)
NEUTROPHILS NFR BLD AUTO: 58.5 % (ref 45–70)
NRBC BLD AUTO-RTO: 0 /100 WBC (ref 0–0)
PLATELET # BLD AUTO: 290 10*3/MM3 (ref 140–500)
POTASSIUM SERPL-SCNC: 4.7 MMOL/L (ref 3.5–5.2)
PROT SERPL-MCNC: 6.8 G/DL (ref 6–8.5)
RBC # BLD AUTO: 4.16 10*6/MM3 (ref 4.2–5.4)
SODIUM SERPL-SCNC: 145 MMOL/L (ref 136–145)
T4 FREE SERPL-MCNC: 1.47 NG/DL (ref 0.93–1.7)
TRIGL SERPL-MCNC: 77 MG/DL (ref 0–150)
TSH SERPL DL<=0.005 MIU/L-ACNC: 0.31 MIU/ML (ref 0.27–4.2)
VLDLC SERPL CALC-MCNC: 15.4 MG/DL (ref 7–27)
WBC # BLD AUTO: 4.9 10*3/MM3 (ref 4.8–10.8)

## 2018-03-14 PROCEDURE — 99214 OFFICE O/P EST MOD 30 MIN: CPT | Performed by: INTERNAL MEDICINE

## 2018-03-14 RX ORDER — HYDROCODONE BITARTRATE AND ACETAMINOPHEN 10; 325 MG/1; MG/1
1 TABLET ORAL EVERY 6 HOURS PRN
Qty: 120 TABLET | Refills: 0 | Status: SHIPPED | OUTPATIENT
Start: 2018-03-14 | End: 2018-06-14 | Stop reason: SDUPTHER

## 2018-03-14 NOTE — PATIENT INSTRUCTIONS
Bird was seen today for hypertension, hypothyroidism and hyperlipidemia.    Diagnoses and all orders for this visit:    Essential hypertension  -     Comprehensive metabolic panel  -     CBC w AUTO Differential    Mixed hyperlipidemia  -     Lipid Panel w/ Chol/HDL Ratio    Hypothyroidism, unspecified type  -     TSH  -     T4, free    Screening for osteoporosis    Menopause  -     DEXA Bone Density Axial

## 2018-03-14 NOTE — PROGRESS NOTES
Bird Spear is a 72 y.o. female, who presents with a chief complaint of   Chief Complaint   Patient presents with   • Hypertension   • Hypothyroidism   • Hyperlipidemia       HPI     71 yo female with bilateral AC Oa, BILATERAL trochanteric bursitis.  Doing welll since starting injection.  Her BP is stable at home, usually in 120s, did bring log, 2 slightly higher elevations last few days, no CP or palpitations. She is using less pain meds due to her injections which are really helping.     Tolerating statin without difficulty.    Has no new urinary complaints.     meds reviewed.     The following portions of the patient's history were reviewed and updated as appropriate: allergies, current medications, past family history, past medical history, past social history, past surgical history and problem list.    Allergies: Review of patient's allergies indicates no known allergies.    Current Outpatient Prescriptions:   •  amitriptyline (ELAVIL) 25 MG tablet, Take 1 tablet by mouth Every Night., Disp: 90 tablet, Rfl: 2  •  aspirin 325 MG EC tablet, Take 325 mg by mouth Daily., Disp: , Rfl:   •  Calcium Carbonate (CALTRATE 600) 1500 (600 CA) MG tablet, Take  by mouth., Disp: , Rfl:   •  Coenzyme Q10 (COQ10) 200 MG capsule, Take 1 capsule by mouth Daily., Disp: , Rfl:   •  diphenhydrAMINE (BENADRYL) 25 MG tablet, Take 25 mg by mouth Daily., Disp: , Rfl:   •  DULoxetine (CYMBALTA) 60 MG capsule, TAKE 1 CAPSULE DAILY, Disp: 90 capsule, Rfl: 2  •  hydrochlorothiazide (HYDRODIURIL) 12.5 MG tablet, Take 1 tablet by mouth Daily., Disp: 90 tablet, Rfl: 3  •  hydrochlorothiazide (MICROZIDE) 12.5 MG capsule, TAKE ONE CAPSULE BY MOUTH ONCE DAILY, Disp: 90 capsule, Rfl: 1  •  HYDROcodone-acetaminophen (NORCO)  MG per tablet, Take 1 tablet by mouth Every 6 (Six) Hours As Needed for Moderate Pain ., Disp: 120 tablet, Rfl: 0  •  levothyroxine (SYNTHROID, LEVOTHROID) 125 MCG tablet, TAKE ONE PO DAILY, Disp: 90 tablet,  "Rfl: 1  •  lovastatin (MEVACOR) 20 MG tablet, Take 1 tablet by mouth Every Night., Disp: 90 tablet, Rfl: 2  •  metoprolol tartrate (LOPRESSOR) 25 MG tablet, Take 1 tablet by mouth 2 (Two) Times a Day., Disp: 180 tablet, Rfl: 2  •  Multiple Vitamin (MULTI VITAMIN) tablet, Take  by mouth., Disp: , Rfl:   •  nitrofurantoin, macrocrystal-monohydrate, (MACROBID) 100 MG capsule, Take 1 capsule by mouth 2 (Two) Times a Day., Disp: 20 capsule, Rfl: 0  •  omeprazole (PriLOSEC) 20 MG capsule, Take 1 capsule by mouth as needed (reflux)., Disp: 90 capsule, Rfl: 2  Medications Discontinued During This Encounter   Medication Reason   • HYDROcodone-acetaminophen (NORCO)  MG per tablet Reorder       Review of Systems   Constitutional: Negative.    HENT: Negative.    Respiratory: Negative.    Cardiovascular: Negative.    Gastrointestinal: Negative.    Genitourinary: Negative.    Musculoskeletal: Positive for back pain and gait problem.   Psychiatric/Behavioral: Negative.    All other systems reviewed and are negative.            /88   Pulse 71   Temp 98.6 °F (37 °C)   Resp 18   Ht 149.9 cm (59\")   Wt 66.7 kg (147 lb)   SpO2 95%   BMI 29.69 kg/m²       Physical Exam   Constitutional: She appears well-developed and well-nourished.   HENT:   Head: Normocephalic and atraumatic.   Right Ear: External ear normal.   Left Ear: External ear normal.   Mouth/Throat: Oropharynx is clear and moist.   Eyes: Pupils are equal, round, and reactive to light. Right eye exhibits no discharge. Left eye exhibits no discharge. No scleral icterus.   Neck: Neck supple.   Cardiovascular: Normal rate and regular rhythm.    No murmur heard.  Pulmonary/Chest: Effort normal. No respiratory distress.   Abdominal: Soft. She exhibits no distension.   Neurological: She is alert.   Skin: Skin is warm. Capillary refill takes less than 2 seconds.   Psychiatric: She has a normal mood and affect. Her behavior is normal.   Nursing note and vitals " reviewed.      Lab Results (most recent)     None          Results for orders placed or performed in visit on 12/18/17   POC Urinalysis Dipstick, Automated   Result Value Ref Range    Color Yellow Yellow, Straw, Dark Yellow, Sara    Clarity, UA Clear Clear    Glucose, UA Negative Negative, 1000 mg/dL (3+) mg/dL    Bilirubin Negative Negative    Ketones, UA Negative Negative    Specific Gravity  1.005 1.005 - 1.030    Blood, UA Moderate (A) Negative    pH, Urine 5.6 5.0 - 8.0    Protein,  mg/dL (A) Negative mg/dL    Urobilinogen, UA Normal Normal    Leukocytes Negative Negative    Nitrite, UA Negative Negative           Bird was seen today for hypertension, hypothyroidism and hyperlipidemia.    Diagnoses and all orders for this visit:    Essential hypertension  -     Comprehensive metabolic panel  -     CBC w AUTO Differential    Mixed hyperlipidemia  -     Lipid Panel w/ Chol/HDL Ratio    Hypothyroidism, unspecified type  -     TSH  -     T4, free    Screening for osteoporosis    Menopause  -     DEXA Bone Density Axial    Arthritis  -     HYDROcodone-acetaminophen (NORCO)  MG per tablet; Take 1 tablet by mouth Every 6 (Six) Hours As Needed for Moderate Pain .    Doing great  BP under good control  Great news about her injections, refilled pain meds for breakthrough. Delmar and contract.          Return in about 3 months (around 6/14/2018).    Costa South MD  03/14/2018

## 2018-03-16 ENCOUNTER — TELEPHONE (OUTPATIENT)
Dept: INTERNAL MEDICINE | Facility: CLINIC | Age: 73
End: 2018-03-16

## 2018-03-16 NOTE — TELEPHONE ENCOUNTER
Attempted to contact patient mupltiple times, unable to reach. Line is busy.       ----- Message from Costa South MD sent at 3/15/2018  9:01 AM EDT -----  Labs are good, no changes.  FU 6months, sooner if needs more pain meds.

## 2018-03-27 ENCOUNTER — HOSPITAL ENCOUNTER (OUTPATIENT)
Dept: BONE DENSITY | Facility: HOSPITAL | Age: 73
Discharge: HOME | End: 2018-03-27
Admitting: INTERNAL MEDICINE

## 2018-03-27 PROCEDURE — 77080 DXA BONE DENSITY AXIAL: CPT

## 2018-04-13 ENCOUNTER — OFFICE VISIT (OUTPATIENT)
Dept: ORTHOPEDIC SURGERY | Facility: CLINIC | Age: 73
End: 2018-04-13

## 2018-04-13 DIAGNOSIS — M70.61 GREATER TROCHANTERIC BURSITIS OF BOTH HIPS: Primary | ICD-10-CM

## 2018-04-13 DIAGNOSIS — M70.62 GREATER TROCHANTERIC BURSITIS OF BOTH HIPS: Primary | ICD-10-CM

## 2018-04-13 DIAGNOSIS — M67.911 TENDINOPATHY OF ROTATOR CUFF, RIGHT: ICD-10-CM

## 2018-04-13 PROCEDURE — 20610 DRAIN/INJ JOINT/BURSA W/O US: CPT | Performed by: NURSE PRACTITIONER

## 2018-04-13 PROCEDURE — 99212 OFFICE O/P EST SF 10 MIN: CPT | Performed by: NURSE PRACTITIONER

## 2018-04-13 RX ADMIN — LIDOCAINE HYDROCHLORIDE 4 ML: 10 INJECTION, SOLUTION EPIDURAL; INFILTRATION; INTRACAUDAL; PERINEURAL at 10:50

## 2018-04-13 RX ADMIN — TRIAMCINOLONE ACETONIDE 80 MG: 40 INJECTION, SUSPENSION INTRA-ARTICULAR; INTRAMUSCULAR at 10:50

## 2018-04-13 RX ADMIN — TRIAMCINOLONE ACETONIDE 80 MG: 40 INJECTION, SUSPENSION INTRA-ARTICULAR; INTRAMUSCULAR at 10:51

## 2018-04-13 RX ADMIN — LIDOCAINE HYDROCHLORIDE 4 ML: 10 INJECTION, SOLUTION EPIDURAL; INFILTRATION; INTRACAUDAL; PERINEURAL at 10:51

## 2018-04-13 NOTE — PROGRESS NOTES
Procedure   Large Joint Arthrocentesis  Date/Time: 4/13/2018 10:51 AM  Consent given by: patient  Site marked: site marked  Timeout: Immediately prior to procedure a time out was called to verify the correct patient, procedure, equipment, support staff and site/side marked as required   Supporting Documentation  Indications: pain   Procedure Details  Location: shoulder - R subacromial bursa  Preparation: Patient was prepped and draped in the usual sterile fashion  Needle size: 22 G  Medications administered: 4 mL lidocaine PF 1% 1 %; 80 mg triamcinolone acetonide 40 MG/ML  Patient tolerance: patient tolerated the procedure well with no immediate complications

## 2018-04-13 NOTE — PROGRESS NOTES
Subjective:     Patient ID: Bird Spear is a 72 y.o. female.    Chief Complaint:     History of Present Illness    Ms. Spear presents back to clinic for follow-up hip pain. Greatest pain noted over left hip, lateral aspect mainly present with laying on the left side. Increased pain noted over the last few weeks when she has been completing her home strengthening activities as well. She last received corticosteroid injection on 12/13/2017 with significant symptom relief at bilateral hips. She is also experiencing pain at right hip however not as significant as pain at left hip. Rates pain with movement at 7-8 out of 10, denies pain radiating into groin, denies presence of numbness and tingling radiating down bilateral lower extremities.     Right shoulder pain also has returned over lateral aspect and radiating down to mid humerus. Pain was completely relieved after receiving corticosteroid injection right shoulder in December 2017. Increased pain noted when lying on her right shoulder at night and initially just began noticing the aching sensation intermittently however the last few months the pain has been constant aching without any symptom relief. Increased pain also noted with reaching up and reaching out in front of her. Denies pain radiating up into neck and denies presence of numbness and tingling radiating down right upper extremity. Denies all other concerns present at this time.      Social History     Occupational History   • Not on file.     Social History Main Topics   • Smoking status: Never Smoker   • Smokeless tobacco: Not on file   • Alcohol use No      Comment: caffeine use   • Drug use: Unknown   • Sexual activity: Not on file      Past Medical History:   Diagnosis Date   • Abdominal pain    • Abnormal electrocardiogram    • Arthritis 5/24/2016   • Arthritis of neck    • Chronic pain syndrome 5/24/2016   • Depression    • Edema    • Essential hypertension 5/24/2016   • Fatigue    • Hematuria  2017   • Hx of radiation therapy    • Hyperlipidemia 2016   • Hypertension    • Hypothyroidism    • Hypothyroidism (acquired) 2016   • Lumbosacral disc disease    • Malignant neoplasm of breast    • Renal failure    • Scoliosis    • Screening for osteoporosis 3/14/2018   • Thoracic aortic aneurysm    • Thyroid disease      Past Surgical History:   Procedure Laterality Date   • BACK SURGERY     • BREAST LUMPECTOMY Right    •  SECTION     • HYSTERECTOMY     • SPINAL FUSION         Family History   Problem Relation Age of Onset   • Heart disease Mother    • Hyperlipidemia Mother    • Diabetes Mother    • Hypertension Mother    • Heart disease Father      Coronary artery disease   • Hyperlipidemia Father    • Hypertension Father    • Heart disease Sister    • Hypertension Other          Review of Systems   Constitutional: Negative for chills, diaphoresis, fever and unexpected weight change.   HENT: Negative for hearing loss, nosebleeds, sore throat and tinnitus.    Eyes: Negative for pain and visual disturbance.   Respiratory: Negative for cough, shortness of breath and wheezing.    Cardiovascular: Negative for chest pain and palpitations.   Gastrointestinal: Negative for abdominal pain, diarrhea, nausea and vomiting.   Endocrine: Negative for cold intolerance, heat intolerance and polydipsia.   Genitourinary: Negative for difficulty urinating, dysuria and hematuria.   Musculoskeletal: Positive for arthralgias. Negative for joint swelling and myalgias.   Skin: Negative for rash and wound.   Allergic/Immunologic: Negative for environmental allergies.   Neurological: Negative for dizziness, syncope and numbness.   Hematological: Does not bruise/bleed easily.   Psychiatric/Behavioral: Negative for dysphoric mood and sleep disturbance. The patient is not nervous/anxious.    All other systems reviewed and are negative.          Objective:  Physical Exam  General: No acute distress.  Eyes: conjunctiva  clear; pupils equally round and reactive  ENT: external ears and nose atraumatic; oropharynx clear  CV: no peripheral edema  Resp: normal respiratory effort  Skin: no rashes or wounds; normal turgor  Psych: mood and affect appropriate; recent and remote memory intact    There were no vitals filed for this visit.  There were no vitals filed for this visit.  There is no height or weight on file to calculate BMI.     Ortho Exam    Right Hip Exam      Tenderness   The patient is experiencing tenderness in the greater trochanter.     Range of Motion   Extension: 0   Flexion: 110   Abduction: 45   Adduction: 30      Tests   DINO: negative  Mario: positive  Fadir:  Negative FADIR test     Other   Erythema: absent  Scars: absent  Sensation: normal  Pulse: present        Left Hip Exam      Tenderness   The patient is experiencing tenderness in the greater trochanter.     Range of Motion   Extension: 0   Flexion: 110   Abduction: 45   Adduction: 30      Tests   DINO: negative  Mario: positive  Fadir:  Negative FADIR test     Other   Erythema: absent  Scars: absent  Sensation: normal  Pulse: present        Right Shoulder Exam      Tenderness   The patient is experiencing tenderness in the acromion.     Range of Motion   Forward Flexion: 180+   External Rotation: 50   Internal Rotation 0 degrees: L2      Muscle Strength   Internal Rotation: 4/5   External Rotation: 5/5   Supraspinatus: 4/5   Subscapularis: 4/5   Biceps: 5/5      Tests   Apprehension: negative  Cross Arm: positive  Drop Arm: negative  Hawkin's test: positive  Impingement: positive  Sulcus: absent     Other   Erythema: absent  Scars: absent  Sensation: normal  Pulse: present     Comments:    Mildly positive empty can  negative L[place]er's  negative Speed's  Mildly positive bear hug exam    Assessment:       1. Greater trochanteric bursitis of both hips    2. Tendinopathy of rotator cuff, right          Plan:  1. Discussed plan of care with patient. Wishes to  proceed with corticosteroid injection bilateral hips and right shoulder. Will plan to see her back in 4 months, prn if symptoms worsen. Patient verbalized understanding of all information and agrees with plan of care. Denies all other concerns present at this time.     Orders:  Orders Placed This Encounter   Procedures   • Large Joint Arthrocentesis   • Large Joint Arthrocentesis       Dictated utilizing Dragon dictation     Large Joint Arthrocentesis  Date/Time: 4/13/2018 10:50 AM  Consent given by: patient  Site marked: site marked  Timeout: Immediately prior to procedure a time out was called to verify the correct patient, procedure, equipment, support staff and site/side marked as required   Supporting Documentation  Indications: pain   Procedure Details  Location: hip - Hip joint: bilateral.  Preparation: Patient was prepped and draped in the usual sterile fashion  Needle size: 22 G  Medications administered: 4 mL lidocaine PF 1% 1 %; 80 mg triamcinolone acetonide 40 MG/ML  Patient tolerance: patient tolerated the procedure well with no immediate complications

## 2018-04-17 RX ORDER — TRIAMCINOLONE ACETONIDE 40 MG/ML
80 INJECTION, SUSPENSION INTRA-ARTICULAR; INTRAMUSCULAR
Status: COMPLETED | OUTPATIENT
Start: 2018-04-13 | End: 2018-04-13

## 2018-04-17 RX ORDER — LIDOCAINE HYDROCHLORIDE 10 MG/ML
4 INJECTION, SOLUTION EPIDURAL; INFILTRATION; INTRACAUDAL; PERINEURAL
Status: COMPLETED | OUTPATIENT
Start: 2018-04-13 | End: 2018-04-13

## 2018-04-26 ENCOUNTER — TELEPHONE (OUTPATIENT)
Dept: INTERNAL MEDICINE | Facility: CLINIC | Age: 73
End: 2018-04-26

## 2018-04-26 NOTE — TELEPHONE ENCOUNTER
----- Message from Costa South MD sent at 4/25/2018  8:01 AM EDT -----  Osteoporosis on this dexa, will talk next appt

## 2018-05-25 ENCOUNTER — OFFICE VISIT (OUTPATIENT)
Dept: CARDIOLOGY | Facility: CLINIC | Age: 73
End: 2018-05-25

## 2018-05-25 VITALS
SYSTOLIC BLOOD PRESSURE: 138 MMHG | WEIGHT: 152 LBS | HEIGHT: 59 IN | HEART RATE: 60 BPM | BODY MASS INDEX: 30.64 KG/M2 | DIASTOLIC BLOOD PRESSURE: 68 MMHG

## 2018-05-25 DIAGNOSIS — R06.02 SOB (SHORTNESS OF BREATH): ICD-10-CM

## 2018-05-25 DIAGNOSIS — I71.20 THORACIC AORTIC ANEURYSM WITHOUT RUPTURE (HCC): ICD-10-CM

## 2018-05-25 DIAGNOSIS — I10 ESSENTIAL HYPERTENSION: Primary | ICD-10-CM

## 2018-05-25 PROCEDURE — 93000 ELECTROCARDIOGRAM COMPLETE: CPT | Performed by: INTERNAL MEDICINE

## 2018-05-25 PROCEDURE — 99214 OFFICE O/P EST MOD 30 MIN: CPT | Performed by: INTERNAL MEDICINE

## 2018-06-06 ENCOUNTER — HOSPITAL ENCOUNTER (OUTPATIENT)
Dept: CT IMAGING | Facility: HOSPITAL | Age: 73
Discharge: HOME | End: 2018-06-06
Attending: INTERNAL MEDICINE | Admitting: INTERNAL MEDICINE

## 2018-06-06 DIAGNOSIS — I71.20 THORACIC AORTIC ANEURYSM WITHOUT RUPTURE (HCC): ICD-10-CM

## 2018-06-06 PROCEDURE — 71250 CT THORAX DX C-: CPT

## 2018-06-13 ENCOUNTER — TELEPHONE (OUTPATIENT)
Dept: CARDIOLOGY | Facility: CLINIC | Age: 73
End: 2018-06-13

## 2018-06-13 NOTE — TELEPHONE ENCOUNTER
Patient is calling to get the results of her CT scan that was done on the 6th.  She can be reached at [phone]    Thanks,  Christiane

## 2018-06-13 NOTE — TELEPHONE ENCOUNTER
I talked to patient regarding test results.  Previously noted LAD calcification not mentioned on recent CT chest.  Blood pressure has evened out with systolic pressures between 110-120.  She will continue to with her current regimen and follow-up in one year. roge

## 2018-06-14 ENCOUNTER — OFFICE VISIT (OUTPATIENT)
Dept: INTERNAL MEDICINE | Facility: CLINIC | Age: 73
End: 2018-06-14

## 2018-06-14 VITALS
WEIGHT: 152 LBS | HEART RATE: 64 BPM | DIASTOLIC BLOOD PRESSURE: 68 MMHG | BODY MASS INDEX: 30.64 KG/M2 | HEIGHT: 59 IN | OXYGEN SATURATION: 97 % | RESPIRATION RATE: 16 BRPM | SYSTOLIC BLOOD PRESSURE: 116 MMHG

## 2018-06-14 DIAGNOSIS — I10 ESSENTIAL HYPERTENSION: Primary | ICD-10-CM

## 2018-06-14 DIAGNOSIS — E24.2 DRUG-INDUCED CUSHING'S SYNDROME (HCC): ICD-10-CM

## 2018-06-14 DIAGNOSIS — M70.61 GREATER TROCHANTERIC BURSITIS OF BOTH HIPS: ICD-10-CM

## 2018-06-14 DIAGNOSIS — M70.62 GREATER TROCHANTERIC BURSITIS OF BOTH HIPS: ICD-10-CM

## 2018-06-14 DIAGNOSIS — E03.9 HYPOTHYROIDISM, UNSPECIFIED TYPE: ICD-10-CM

## 2018-06-14 DIAGNOSIS — M19.90 ARTHRITIS: ICD-10-CM

## 2018-06-14 LAB
ALBUMIN SERPL-MCNC: 4.3 G/DL (ref 3.5–5.2)
ALBUMIN/GLOB SERPL: 2.5 G/DL
ALP SERPL-CCNC: 52 U/L (ref 40–129)
ALT SERPL-CCNC: 24 U/L (ref 5–33)
AST SERPL-CCNC: 27 U/L (ref 5–32)
BASOPHILS # BLD AUTO: 0.02 10*3/MM3 (ref 0–0.2)
BASOPHILS NFR BLD AUTO: 0.4 % (ref 0–2)
BILIRUB SERPL-MCNC: 0.3 MG/DL (ref 0.2–1.2)
BUN SERPL-MCNC: 24 MG/DL (ref 8–23)
BUN/CREAT SERPL: 25.5 (ref 7–25)
CALCIUM SERPL-MCNC: 9.9 MG/DL (ref 8.8–10.5)
CHLORIDE SERPL-SCNC: 100 MMOL/L (ref 98–107)
CO2 SERPL-SCNC: 32.2 MMOL/L (ref 22–29)
CREAT SERPL-MCNC: 0.94 MG/DL (ref 0.57–1)
EOSINOPHIL # BLD AUTO: 0.05 10*3/MM3 (ref 0.1–0.3)
EOSINOPHIL NFR BLD AUTO: 0.9 % (ref 0–4)
ERYTHROCYTE [DISTWIDTH] IN BLOOD BY AUTOMATED COUNT: 14.6 % (ref 11.5–14.5)
GFR SERPLBLD CREATININE-BSD FMLA CKD-EPI: 58 ML/MIN/1.73
GFR SERPLBLD CREATININE-BSD FMLA CKD-EPI: 71 ML/MIN/1.73
GLOBULIN SER CALC-MCNC: 1.7 GM/DL
GLUCOSE SERPL-MCNC: 87 MG/DL (ref 65–99)
HCT VFR BLD AUTO: 39.2 % (ref 37–47)
HGB BLD-MCNC: 12.9 G/DL (ref 12–16)
IMM GRANULOCYTES # BLD: 0.02 10*3/MM3 (ref 0–0.03)
IMM GRANULOCYTES NFR BLD: 0.4 % (ref 0–0.5)
LYMPHOCYTES # BLD AUTO: 1.18 10*3/MM3 (ref 0.6–4.8)
LYMPHOCYTES NFR BLD AUTO: 20.7 % (ref 20–45)
MCH RBC QN AUTO: 32.6 PG (ref 27–31)
MCHC RBC AUTO-ENTMCNC: 32.9 G/DL (ref 31–37)
MCV RBC AUTO: 99 FL (ref 81–99)
MONOCYTES # BLD AUTO: 0.7 10*3/MM3 (ref 0–1)
MONOCYTES NFR BLD AUTO: 12.3 % (ref 3–8)
NEUTROPHILS # BLD AUTO: 3.72 10*3/MM3 (ref 1.5–8.3)
NEUTROPHILS NFR BLD AUTO: 65.3 % (ref 45–70)
NRBC BLD AUTO-RTO: 0 /100 WBC (ref 0–0)
PLATELET # BLD AUTO: 311 10*3/MM3 (ref 140–500)
POTASSIUM SERPL-SCNC: 3.5 MMOL/L (ref 3.5–5.2)
PROT SERPL-MCNC: 6 G/DL (ref 6–8.5)
RBC # BLD AUTO: 3.96 10*6/MM3 (ref 4.2–5.4)
SODIUM SERPL-SCNC: 144 MMOL/L (ref 136–145)
TSH SERPL DL<=0.005 MIU/L-ACNC: 0.3 MIU/ML (ref 0.27–4.2)
WBC # BLD AUTO: 5.69 10*3/MM3 (ref 4.8–10.8)

## 2018-06-14 PROCEDURE — 99214 OFFICE O/P EST MOD 30 MIN: CPT | Performed by: INTERNAL MEDICINE

## 2018-06-14 RX ORDER — HYDROCODONE BITARTRATE AND ACETAMINOPHEN 10; 325 MG/1; MG/1
1 TABLET ORAL EVERY 6 HOURS PRN
Qty: 120 TABLET | Refills: 0 | Status: SHIPPED | OUTPATIENT
Start: 2018-06-14 | End: 2018-09-14 | Stop reason: SDUPTHER

## 2018-06-14 RX ORDER — DULOXETIN HYDROCHLORIDE 60 MG/1
60 CAPSULE, DELAYED RELEASE ORAL DAILY
Qty: 90 CAPSULE | Refills: 2 | Status: SHIPPED | OUTPATIENT
Start: 2018-06-14 | End: 2019-03-18 | Stop reason: SDUPTHER

## 2018-06-14 NOTE — PATIENT INSTRUCTIONS
Bird was seen today for hypothyroidism and hypertension.    Diagnoses and all orders for this visit:    Essential hypertension  -     Comprehensive metabolic panel    Drug-induced Cushing's syndrome  -     Comprehensive metabolic panel  -     CBC w AUTO Differential    Hypothyroidism, unspecified type  -     TSH    Greater trochanteric bursitis of both hips    Arthritis  -     DULoxetine (CYMBALTA) 60 MG capsule; Take 1 capsule by mouth Daily.  -     HYDROcodone-acetaminophen (NORCO)  MG per tablet; Take 1 tablet by mouth Every 6 (Six) Hours As Needed for Moderate Pain .      Discussed adrenal suppresion with her, now 6 weeks out from last injection  Will let me know if she has any  Other problems.

## 2018-06-14 NOTE — PROGRESS NOTES
Bird Spear is a 72 y.o. female, who presents with a chief complaint of   Chief Complaint   Patient presents with   • Hypothyroidism   • Hypertension       HPI     73 Yo female with recent trouble with facial swelling and trunk swelling following after having Ac and greater trochanter injections with ortho.her MSKproblems are much better. She has both a bp log with her today and a symptom log. Her BP are running 110s-120s but she is having a lot of dizziness and queasiness type sensation. She did speak to Dr. Squires, who with her aneurysm and bp control goal <120, wants her to defer further injections for a while.    She is slowly getting better, last injection 4/13/18.  She is more active now.  She is able to garden, etc. No persistent headache, cp or dizziness.     The following portions of the patient's history were reviewed and updated as appropriate: allergies, current medications, past family history, past medical history, past social history, past surgical history and problem list.    Allergies: Patient has no known allergies.    Current Outpatient Prescriptions:   •  amitriptyline (ELAVIL) 25 MG tablet, Take 1 tablet by mouth Every Night., Disp: 90 tablet, Rfl: 2  •  aspirin 325 MG EC tablet, Take 325 mg by mouth Daily., Disp: , Rfl:   •  Calcium Carbonate (CALTRATE 600) 1500 (600 CA) MG tablet, Take  by mouth., Disp: , Rfl:   •  Coenzyme Q10 (COQ10) 200 MG capsule, Take 1 capsule by mouth Daily., Disp: , Rfl:   •  diphenhydrAMINE (BENADRYL) 25 MG tablet, Take 25 mg by mouth Daily., Disp: , Rfl:   •  DULoxetine (CYMBALTA) 60 MG capsule, Take 1 capsule by mouth Daily., Disp: 90 capsule, Rfl: 2  •  hydrochlorothiazide (MICROZIDE) 12.5 MG capsule, TAKE ONE CAPSULE BY MOUTH ONCE DAILY, Disp: 90 capsule, Rfl: 1  •  HYDROcodone-acetaminophen (NORCO)  MG per tablet, Take 1 tablet by mouth Every 6 (Six) Hours As Needed for Moderate Pain ., Disp: 120 tablet, Rfl: 0  •  levothyroxine (SYNTHROID,  "LEVOTHROID) 125 MCG tablet, TAKE ONE PO DAILY, Disp: 90 tablet, Rfl: 1  •  lovastatin (MEVACOR) 20 MG tablet, Take 1 tablet by mouth Every Night., Disp: 90 tablet, Rfl: 2  •  metoprolol tartrate (LOPRESSOR) 25 MG tablet, Take 1 tablet by mouth 2 (Two) Times a Day., Disp: 180 tablet, Rfl: 2  •  Multiple Vitamin (MULTI VITAMIN) tablet, Take  by mouth., Disp: , Rfl:   •  omeprazole (PriLOSEC) 20 MG capsule, Take 1 capsule by mouth as needed (reflux)., Disp: 90 capsule, Rfl: 2  Medications Discontinued During This Encounter   Medication Reason   • DULoxetine (CYMBALTA) 60 MG capsule Reorder   • HYDROcodone-acetaminophen (NORCO)  MG per tablet Reorder       Review of Systems   Constitutional: Negative.         Facial swelling   HENT: Negative.    Respiratory: Negative.    Cardiovascular: Negative.  Negative for chest pain and palpitations.   Genitourinary: Negative.    Musculoskeletal: Negative.    Skin:        Facial swelling, spongy   Neurological: Positive for dizziness.   Hematological: Negative.    Psychiatric/Behavioral: The patient is nervous/anxious.    All other systems reviewed and are negative.            /68   Pulse 64   Resp 16   Ht 149.9 cm (59\")   Wt 68.9 kg (152 lb)   SpO2 97%   BMI 30.70 kg/m²       Physical Exam   Constitutional: She is oriented to person, place, and time. She appears well-developed and well-nourished.   HENT:   Cushingoid facies   Eyes: Pupils are equal, round, and reactive to light. Right eye exhibits no discharge. Left eye exhibits no discharge.   Cardiovascular: Normal rate and regular rhythm.    Pulmonary/Chest: Effort normal and breath sounds normal.   Musculoskeletal: She exhibits no edema.   Neurological: She is alert and oriented to person, place, and time.   Skin: Capillary refill takes less than 2 seconds.   Some tan color to skin   Psychiatric: She has a normal mood and affect.   Nursing note and vitals reviewed.      Lab Results (most recent)     None    "       Results for orders placed or performed in visit on 03/14/18   Comprehensive metabolic panel   Result Value Ref Range    Glucose 98 65 - 99 mg/dL    BUN 19 8 - 23 mg/dL    Creatinine 1.06 (H) 0.57 - 1.00 mg/dL    eGFR Non African Am 51 (L) >60 mL/min/1.73    eGFR African Am 62 >60 mL/min/1.73    BUN/Creatinine Ratio 17.9 7.0 - 25.0    Sodium 145 136 - 145 mmol/L    Potassium 4.7 3.5 - 5.2 mmol/L    Chloride 101 98 - 107 mmol/L    Total CO2 32.2 (H) 22.0 - 29.0 mmol/L    Calcium 10.2 8.8 - 10.5 mg/dL    Total Protein 6.8 6.0 - 8.5 g/dL    Albumin 4.50 3.50 - 5.20 g/dL    Globulin 2.3 gm/dL    A/G Ratio 2.0 g/dL    Total Bilirubin 0.6 0.2 - 1.2 mg/dL    Alkaline Phosphatase 58 40 - 129 U/L    AST (SGOT) 25 5 - 32 U/L    ALT (SGPT) 19 5 - 33 U/L   TSH   Result Value Ref Range    TSH 0.310 0.270 - 4.200 mIU/mL   T4, free   Result Value Ref Range    Free T4 1.47 0.93 - 1.70 ng/dL   Lipid Panel w/ Chol/HDL Ratio   Result Value Ref Range    Total Cholesterol 213 (H) 0 - 200 mg/dL    Triglycerides 77 0 - 150 mg/dL    HDL Cholesterol 80 (H) 40 - 60 mg/dL    VLDL Cholesterol 15.4 7 - 27 mg/dL    LDL Cholesterol  118 (H) 0 - 100 mg/dL    Chol/HDL Ratio 2.66    CBC w AUTO Differential   Result Value Ref Range    WBC 4.90 4.80 - 10.80 10*3/mm3    RBC 4.16 (L) 4.20 - 5.40 10*6/mm3    Hemoglobin 13.4 12.0 - 16.0 g/dL    Hematocrit 40.5 37.0 - 47.0 %    MCV 97.4 81.0 - 99.0 fL    MCH 32.2 (H) 27.0 - 31.0 pg    MCHC 33.1 31.0 - 37.0 g/dL    RDW 14.6 (H) 11.5 - 14.5 %    Platelets 290 140 - 500 10*3/mm3    Neutrophil Rel % 58.5 45.0 - 70.0 %    Lymphocyte Rel % 26.3 20.0 - 45.0 %    Monocyte Rel % 12.2 (H) 3.0 - 8.0 %    Eosinophil Rel % 2.0 0.0 - 4.0 %    Basophil Rel % 0.8 0.0 - 2.0 %    Neutrophils Absolute 2.86 1.50 - 8.30 10*3/mm3    Lymphocytes Absolute 1.29 0.60 - 4.80 10*3/mm3    Monocytes Absolute 0.60 0.00 - 1.00 10*3/mm3    Eosinophils Absolute 0.10 0.10 - 0.30 10*3/mm3    Basophils Absolute 0.04 0.00 - 0.20 10*3/mm3     Immature Granulocyte Rel % 0.2 0.0 - 0.5 %    Immature Grans Absolute 0.01 0.00 - 0.03 10*3/mm3    nRBC 0.0 0.0 - 0.0 /100 WBC           Bird was seen today for hypothyroidism and hypertension.    Diagnoses and all orders for this visit:    Essential hypertension  -     Comprehensive metabolic panel    Drug-induced Cushing's syndrome  -     Comprehensive metabolic panel  -     CBC w AUTO Differential    Hypothyroidism, unspecified type  -     TSH    Greater trochanteric bursitis of both hips    Arthritis  -     DULoxetine (CYMBALTA) 60 MG capsule; Take 1 capsule by mouth Daily.  -     HYDROcodone-acetaminophen (NORCO)  MG per tablet; Take 1 tablet by mouth Every 6 (Six) Hours As Needed for Moderate Pain .      Discussed adrenal suppresion with her, now 6 weeks out from last injection  Will let me know if she has any  Other problems.    Return in about 3 months (around 9/14/2018).    Costa South MD  06/14/2018

## 2018-09-02 DIAGNOSIS — G47.00 INSOMNIA, UNSPECIFIED TYPE: ICD-10-CM

## 2018-09-02 DIAGNOSIS — E03.9 HYPOTHYROIDISM (ACQUIRED): ICD-10-CM

## 2018-09-04 RX ORDER — AMITRIPTYLINE HYDROCHLORIDE 25 MG/1
TABLET, FILM COATED ORAL
Qty: 90 TABLET | Refills: 0 | Status: SHIPPED | OUTPATIENT
Start: 2018-09-04 | End: 2018-12-04 | Stop reason: SDUPTHER

## 2018-09-04 RX ORDER — LEVOTHYROXINE SODIUM 0.12 MG/1
TABLET ORAL
Qty: 90 TABLET | Refills: 0 | Status: SHIPPED | OUTPATIENT
Start: 2018-09-04 | End: 2018-12-04 | Stop reason: SDUPTHER

## 2018-09-04 RX ORDER — HYDROCHLOROTHIAZIDE 12.5 MG/1
CAPSULE, GELATIN COATED ORAL
Qty: 90 CAPSULE | Refills: 0 | Status: SHIPPED | OUTPATIENT
Start: 2018-09-04 | End: 2018-12-04 | Stop reason: SDUPTHER

## 2018-09-13 ENCOUNTER — OFFICE VISIT (OUTPATIENT)
Dept: INTERNAL MEDICINE | Facility: CLINIC | Age: 73
End: 2018-09-13

## 2018-09-13 VITALS
BODY MASS INDEX: 29.96 KG/M2 | SYSTOLIC BLOOD PRESSURE: 136 MMHG | OXYGEN SATURATION: 97 % | WEIGHT: 148.6 LBS | RESPIRATION RATE: 14 BRPM | HEART RATE: 78 BPM | HEIGHT: 59 IN | DIASTOLIC BLOOD PRESSURE: 84 MMHG

## 2018-09-13 DIAGNOSIS — R06.02 SOB (SHORTNESS OF BREATH): ICD-10-CM

## 2018-09-13 DIAGNOSIS — Z12.11 SCREENING FOR COLON CANCER: ICD-10-CM

## 2018-09-13 DIAGNOSIS — B37.0 THRUSH, ORAL: Primary | ICD-10-CM

## 2018-09-13 DIAGNOSIS — Z17.0 MALIGNANT NEOPLASM OF RIGHT BREAST IN FEMALE, ESTROGEN RECEPTOR POSITIVE, UNSPECIFIED SITE OF BREAST (HCC): ICD-10-CM

## 2018-09-13 DIAGNOSIS — C50.911 MALIGNANT NEOPLASM OF RIGHT BREAST IN FEMALE, ESTROGEN RECEPTOR POSITIVE, UNSPECIFIED SITE OF BREAST (HCC): ICD-10-CM

## 2018-09-13 PROCEDURE — 99214 OFFICE O/P EST MOD 30 MIN: CPT | Performed by: INTERNAL MEDICINE

## 2018-09-13 NOTE — PATIENT INSTRUCTIONS
Bird was seen today for pain and hyperlipidemia.    Diagnoses and all orders for this visit:    Thrush, oral  -     nystatin (MYCOSTATIN) [id number] UNIT/ML suspension; Swish and swallow 5 mL 4 (Four) Times a Day.    Malignant neoplasm of right breast in female, estrogen receptor positive, unspecified site of breast (CMS/HCC)    Screening for colon cancer  -     Ambulatory Referral For Screening Colonoscopy    Triall 3 months of mevacor, concern her bilateral leg pain  Shoulder pain likely OA.       Return in about 3 months (around 12/13/2018).

## 2018-09-13 NOTE — PROGRESS NOTES
Bird Spear is a 72 y.o. female, who presents with a chief complaint of   Chief Complaint   Patient presents with   • Pain   • Hyperlipidemia       HPI   73 yo female with pmhx chronic pain, R breast cancer followed by Dr. Sánchez ([facility]).  She has HTN, HL and hypothyroidism.     Having new problem with morning white tongue that gets better with brushing her tongue. She noted the white taste after her cushingoid reaction.  She reports it comes back daily. No odynophagia or dysphagia.  She continues to have bilateral lateral thigh pain with less trochanteric involvement. She has bilateral shoulder pain without impedence to activities.  She also complains of some baseline dyspnea since her steroid reaction. She hesitates to tell me about it, stating it is slowly getting better.  She is seeing Dr. Squires regularly. Her home bp are well controlled. NO cp or diaphoresis. Her dyspnea may be coming more with eating. She is almost due for colonscopy.           The following portions of the patient's history were reviewed and updated as appropriate: allergies, current medications, past family history, past medical history, past social history, past surgical history and problem list.    Allergies: Patient has no known allergies.    Current Outpatient Prescriptions:   •  amitriptyline (ELAVIL) 25 MG tablet, TAKE ONE TABLET BY MOUTH ONCE DAILY IN THE EVENING, Disp: 90 tablet, Rfl: 0  •  aspirin 325 MG EC tablet, Take 325 mg by mouth Daily., Disp: , Rfl:   •  Calcium Carbonate (CALTRATE 600) 1500 (600 CA) MG tablet, Take  by mouth., Disp: , Rfl:   •  Coenzyme Q10 (COQ10) 200 MG capsule, Take 1 capsule by mouth Daily., Disp: , Rfl:   •  diphenhydrAMINE (BENADRYL) 25 MG tablet, Take 25 mg by mouth Daily., Disp: , Rfl:   •  DULoxetine (CYMBALTA) 60 MG capsule, Take 1 capsule by mouth Daily., Disp: 90 capsule, Rfl: 2  •  hydrochlorothiazide (MICROZIDE) 12.5 MG capsule, TAKE 1 CAPSULE BY MOUTH ONCE DAILY, Disp: 90 capsule, Rfl:  "0  •  HYDROcodone-acetaminophen (NORCO)  MG per tablet, Take 1 tablet by mouth Every 6 (Six) Hours As Needed for Moderate Pain ., Disp: 120 tablet, Rfl: 0  •  levothyroxine (SYNTHROID, LEVOTHROID) 125 MCG tablet, TAKE ONE TABLET BY MOUTH ONCE DAILY, Disp: 90 tablet, Rfl: 0  •  lovastatin (MEVACOR) 20 MG tablet, Take 1 tablet by mouth Every Night., Disp: 90 tablet, Rfl: 2  •  metoprolol tartrate (LOPRESSOR) 25 MG tablet, Take 1 tablet by mouth 2 (Two) Times a Day., Disp: 180 tablet, Rfl: 2  •  Multiple Vitamin (MULTI VITAMIN) tablet, Take  by mouth., Disp: , Rfl:   •  omeprazole (PriLOSEC) 20 MG capsule, Take 1 capsule by mouth as needed (reflux)., Disp: 90 capsule, Rfl: 2  •  nystatin (MYCOSTATIN) [id number] UNIT/ML suspension, Swish and swallow 5 mL 4 (Four) Times a Day., Disp: 473 mL, Rfl: 1  There are no discontinued medications.    Review of Systems   Constitutional: Negative for appetite change, fatigue, fever and unexpected weight change.   HENT: Negative for sinus pressure and trouble swallowing.    Respiratory: Negative for cough and chest tightness.    Cardiovascular: Negative for chest pain, palpitations and leg swelling.   Gastrointestinal: Negative for constipation and diarrhea.   Genitourinary: Negative for dysuria, frequency, hematuria, pelvic pain and vaginal discharge.   Musculoskeletal: Negative.    Skin: Negative.    Neurological: Negative for dizziness, light-headedness and headaches.   Hematological: Negative.    Psychiatric/Behavioral: Negative.              /84   Pulse 78   Resp 14   Ht 149.9 cm (59\")   Wt 67.4 kg (148 lb 9.6 oz)   SpO2 97%   BMI 30.01 kg/m²       Physical Exam   Constitutional: She is oriented to person, place, and time. She appears well-developed and well-nourished.   HENT:   Head: Normocephalic and atraumatic.   Right Ear: External ear normal.   Left Ear: External ear normal.   Eyes: Pupils are equal, round, and reactive to light. EOM are normal.   Neck: Normal " range of motion. Neck supple. No tracheal deviation present. No thyromegaly present.   Cardiovascular: Normal rate, regular rhythm and normal heart sounds.  Exam reveals no friction rub.    No murmur heard.  Pulmonary/Chest: Effort normal and breath sounds normal.   Neurological: She is alert and oriented to person, place, and time.   Skin: Skin is warm and dry.   Psychiatric: She has a normal mood and affect. Her behavior is normal.   Vitals reviewed.      Lab Results (most recent)     None          Results for orders placed or performed in visit on 06/14/18   Comprehensive metabolic panel   Result Value Ref Range    Glucose 87 65 - 99 mg/dL    BUN 24 (H) 8 - 23 mg/dL    Creatinine 0.94 0.57 - 1.00 mg/dL    eGFR Non African Am 58 (L) >60 mL/min/1.73    eGFR African Am 71 >60 mL/min/1.73    BUN/Creatinine Ratio 25.5 (H) 7.0 - 25.0    Sodium 144 136 - 145 mmol/L    Potassium 3.5 3.5 - 5.2 mmol/L    Chloride 100 98 - 107 mmol/L    Total CO2 32.2 (H) 22.0 - 29.0 mmol/L    Calcium 9.9 8.8 - 10.5 mg/dL    Total Protein 6.0 6.0 - 8.5 g/dL    Albumin 4.30 3.50 - 5.20 g/dL    Globulin 1.7 gm/dL    A/G Ratio 2.5 g/dL    Total Bilirubin 0.3 0.2 - 1.2 mg/dL    Alkaline Phosphatase 52 40 - 129 U/L    AST (SGOT) 27 5 - 32 U/L    ALT (SGPT) 24 5 - 33 U/L   TSH   Result Value Ref Range    TSH 0.297 0.270 - 4.200 mIU/mL   CBC w AUTO Differential   Result Value Ref Range    WBC 5.69 4.80 - 10.80 10*3/mm3    RBC 3.96 (L) 4.20 - 5.40 10*6/mm3    Hemoglobin 12.9 12.0 - 16.0 g/dL    Hematocrit 39.2 37.0 - 47.0 %    MCV 99.0 81.0 - 99.0 fL    MCH 32.6 (H) 27.0 - 31.0 pg    MCHC 32.9 31.0 - 37.0 g/dL    RDW 14.6 (H) 11.5 - 14.5 %    Platelets 311 140 - 500 10*3/mm3    Neutrophil Rel % 65.3 45.0 - 70.0 %    Lymphocyte Rel % 20.7 20.0 - 45.0 %    Monocyte Rel % 12.3 (H) 3.0 - 8.0 %    Eosinophil Rel % 0.9 0.0 - 4.0 %    Basophil Rel % 0.4 0.0 - 2.0 %    Neutrophils Absolute 3.72 1.50 - 8.30 10*3/mm3    Lymphocytes Absolute 1.18 0.60 - 4.80  10*3/mm3    Monocytes Absolute 0.70 0.00 - 1.00 10*3/mm3    Eosinophils Absolute 0.05 (L) 0.10 - 0.30 10*3/mm3    Basophils Absolute 0.02 0.00 - 0.20 10*3/mm3    Immature Granulocyte Rel % 0.4 0.0 - 0.5 %    Immature Grans Absolute 0.02 0.00 - 0.03 10*3/mm3    nRBC 0.0 0.0 - 0.0 /100 WBC   6/6/18 - CT chest - negative  2/1/2016 - echo reviewed. DD, normal EF        Bird was seen today for pain and hyperlipidemia.    Diagnoses and all orders for this visit:    Thrush, oral  -     nystatin (MYCOSTATIN) [id number] UNIT/ML suspension; Swish and swallow 5 mL 4 (Four) Times a Day.    Malignant neoplasm of right breast in female, estrogen receptor positive, unspecified site of breast (CMS/HCC)    Screening for colon cancer  -     Ambulatory Referral For Screening Colonoscopy    SOB (shortness of breath)    Triall 3 months of mevacor, concern her bilateral leg pain  Shoulder pain likely OA.     Dyspnea sounds very mild, will ask her to let me know if not resolving.  ER for any worsening or new symptoms like L arm pain, nausea, cp etc.         Return in about 3 months (around 12/13/2018).    Costa South MD  09/13/2018

## 2018-09-14 DIAGNOSIS — M19.90 ARTHRITIS: ICD-10-CM

## 2018-09-14 RX ORDER — HYDROCODONE BITARTRATE AND ACETAMINOPHEN 10; 325 MG/1; MG/1
1 TABLET ORAL EVERY 6 HOURS PRN
Qty: 120 TABLET | Refills: 0 | Status: SHIPPED | OUTPATIENT
Start: 2018-09-14 | End: 2018-12-12 | Stop reason: SDUPTHER

## 2018-09-20 ENCOUNTER — PREP FOR SURGERY (OUTPATIENT)
Dept: OTHER | Facility: HOSPITAL | Age: 73
End: 2018-09-20

## 2018-09-20 ENCOUNTER — TELEPHONE (OUTPATIENT)
Dept: SURGERY | Facility: CLINIC | Age: 73
End: 2018-09-20

## 2018-09-20 DIAGNOSIS — Z12.11 SCREENING FOR COLON CANCER: Primary | ICD-10-CM

## 2018-11-19 ENCOUNTER — OUTSIDE FACILITY SERVICE (OUTPATIENT)
Dept: GASTROENTEROLOGY | Facility: CLINIC | Age: 73
End: 2018-11-19

## 2018-11-19 ENCOUNTER — LAB REQUISITION (OUTPATIENT)
Dept: LAB | Facility: HOSPITAL | Age: 73
End: 2018-11-19

## 2018-11-19 DIAGNOSIS — Z12.11 ENCOUNTER FOR SCREENING FOR MALIGNANT NEOPLASM OF COLON: ICD-10-CM

## 2018-11-19 PROCEDURE — 45380 COLONOSCOPY AND BIOPSY: CPT | Performed by: INTERNAL MEDICINE

## 2018-11-19 PROCEDURE — 88305 TISSUE EXAM BY PATHOLOGIST: CPT | Performed by: INTERNAL MEDICINE

## 2018-11-21 LAB
CYTO UR: NORMAL
LAB AP CASE REPORT: NORMAL
LAB AP CLINICAL INFORMATION: NORMAL
PATH REPORT.FINAL DX SPEC: NORMAL
PATH REPORT.GROSS SPEC: NORMAL

## 2018-12-04 DIAGNOSIS — G47.00 INSOMNIA, UNSPECIFIED TYPE: ICD-10-CM

## 2018-12-04 DIAGNOSIS — E03.9 HYPOTHYROIDISM (ACQUIRED): ICD-10-CM

## 2018-12-04 RX ORDER — HYDROCHLOROTHIAZIDE 12.5 MG/1
CAPSULE, GELATIN COATED ORAL
Qty: 90 CAPSULE | Refills: 1 | Status: SHIPPED | OUTPATIENT
Start: 2018-12-04 | End: 2019-03-18

## 2018-12-04 RX ORDER — AMITRIPTYLINE HYDROCHLORIDE 25 MG/1
TABLET, FILM COATED ORAL
Qty: 90 TABLET | Refills: 1 | Status: SHIPPED | OUTPATIENT
Start: 2018-12-04 | End: 2019-06-04 | Stop reason: SDUPTHER

## 2018-12-04 RX ORDER — LEVOTHYROXINE SODIUM 0.12 MG/1
TABLET ORAL
Qty: 90 TABLET | Refills: 1 | Status: SHIPPED | OUTPATIENT
Start: 2018-12-04 | End: 2019-11-25

## 2018-12-12 ENCOUNTER — OFFICE VISIT (OUTPATIENT)
Dept: INTERNAL MEDICINE | Facility: CLINIC | Age: 73
End: 2018-12-12

## 2018-12-12 VITALS
HEART RATE: 68 BPM | WEIGHT: 147 LBS | BODY MASS INDEX: 29.64 KG/M2 | RESPIRATION RATE: 16 BRPM | DIASTOLIC BLOOD PRESSURE: 88 MMHG | OXYGEN SATURATION: 97 % | HEIGHT: 59 IN | SYSTOLIC BLOOD PRESSURE: 134 MMHG

## 2018-12-12 DIAGNOSIS — I10 ESSENTIAL HYPERTENSION: Primary | ICD-10-CM

## 2018-12-12 DIAGNOSIS — G89.4 CHRONIC PAIN SYNDROME: ICD-10-CM

## 2018-12-12 DIAGNOSIS — M19.90 ARTHRITIS: ICD-10-CM

## 2018-12-12 DIAGNOSIS — E78.2 MIXED HYPERLIPIDEMIA: ICD-10-CM

## 2018-12-12 DIAGNOSIS — E03.9 HYPOTHYROIDISM, UNSPECIFIED TYPE: ICD-10-CM

## 2018-12-12 DIAGNOSIS — J06.9 ACUTE URI: ICD-10-CM

## 2018-12-12 LAB
ALBUMIN SERPL-MCNC: 4.6 G/DL (ref 3.5–5.2)
ALBUMIN/GLOB SERPL: 1.8 G/DL
ALP SERPL-CCNC: 73 U/L (ref 40–129)
ALT SERPL-CCNC: 15 U/L (ref 5–33)
AST SERPL-CCNC: 23 U/L (ref 5–32)
BASOPHILS # BLD AUTO: 0.04 10*3/MM3 (ref 0–0.2)
BASOPHILS NFR BLD AUTO: 0.7 % (ref 0–2)
BILIRUB SERPL-MCNC: 0.3 MG/DL (ref 0.2–1.2)
BUN SERPL-MCNC: 13 MG/DL (ref 8–23)
BUN/CREAT SERPL: 13.7 (ref 7–25)
CALCIUM SERPL-MCNC: 9.7 MG/DL (ref 8.8–10.5)
CHLORIDE SERPL-SCNC: 100 MMOL/L (ref 98–107)
CHOLEST SERPL-MCNC: 286 MG/DL (ref 0–200)
CO2 SERPL-SCNC: 29 MMOL/L (ref 22–29)
CREAT SERPL-MCNC: 0.95 MG/DL (ref 0.57–1)
EOSINOPHIL # BLD AUTO: 0.2 10*3/MM3 (ref 0.1–0.3)
EOSINOPHIL NFR BLD AUTO: 3.3 % (ref 0–4)
ERYTHROCYTE [DISTWIDTH] IN BLOOD BY AUTOMATED COUNT: 13.7 % (ref 11.5–14.5)
GLOBULIN SER CALC-MCNC: 2.5 GM/DL
GLUCOSE SERPL-MCNC: 98 MG/DL (ref 65–99)
HCT VFR BLD AUTO: 39.1 % (ref 37–47)
HDLC SERPL-MCNC: 53 MG/DL (ref 40–60)
HGB BLD-MCNC: 12.9 G/DL (ref 12–16)
IMM GRANULOCYTES # BLD: 0.02 10*3/MM3 (ref 0–0.03)
IMM GRANULOCYTES NFR BLD: 0.3 % (ref 0–0.5)
LDLC SERPL CALC-MCNC: 209 MG/DL (ref 0–100)
LDLC/HDLC SERPL: 3.95 {RATIO}
LYMPHOCYTES # BLD AUTO: 1.15 10*3/MM3 (ref 0.6–4.8)
LYMPHOCYTES NFR BLD AUTO: 18.8 % (ref 20–45)
MCH RBC QN AUTO: 30.5 PG (ref 27–31)
MCHC RBC AUTO-ENTMCNC: 33 G/DL (ref 31–37)
MCV RBC AUTO: 92.4 FL (ref 81–99)
MONOCYTES # BLD AUTO: 0.84 10*3/MM3 (ref 0–1)
MONOCYTES NFR BLD AUTO: 13.7 % (ref 3–8)
NEUTROPHILS # BLD AUTO: 3.88 10*3/MM3 (ref 1.5–8.3)
NEUTROPHILS NFR BLD AUTO: 63.2 % (ref 45–70)
NRBC BLD AUTO-RTO: 0 /100 WBC (ref 0–0)
PLATELET # BLD AUTO: 319 10*3/MM3 (ref 140–500)
POTASSIUM SERPL-SCNC: 4 MMOL/L (ref 3.5–5.2)
PROT SERPL-MCNC: 7.1 G/DL (ref 6–8.5)
RBC # BLD AUTO: 4.23 10*6/MM3 (ref 4.2–5.4)
SODIUM SERPL-SCNC: 143 MMOL/L (ref 136–145)
T4 FREE SERPL-MCNC: 1.27 NG/DL (ref 0.93–1.7)
TRIGL SERPL-MCNC: 118 MG/DL (ref 0–150)
TSH SERPL DL<=0.005 MIU/L-ACNC: 0.42 MIU/ML (ref 0.27–4.2)
VLDLC SERPL CALC-MCNC: 23.6 MG/DL (ref 7–27)
WBC # BLD AUTO: 6.13 10*3/MM3 (ref 4.8–10.8)

## 2018-12-12 PROCEDURE — 99214 OFFICE O/P EST MOD 30 MIN: CPT | Performed by: INTERNAL MEDICINE

## 2018-12-12 RX ORDER — HYDROCODONE BITARTRATE AND ACETAMINOPHEN 10; 325 MG/1; MG/1
1 TABLET ORAL EVERY 6 HOURS PRN
Qty: 120 TABLET | Refills: 0 | Status: SHIPPED | OUTPATIENT
Start: 2018-12-12 | End: 2018-12-12 | Stop reason: SDUPTHER

## 2018-12-12 RX ORDER — HYDROCODONE BITARTRATE AND ACETAMINOPHEN 10; 325 MG/1; MG/1
1 TABLET ORAL EVERY 6 HOURS PRN
Qty: 120 TABLET | Refills: 0 | Status: SHIPPED | OUTPATIENT
Start: 2018-12-12 | End: 2019-03-18 | Stop reason: SDUPTHER

## 2018-12-12 NOTE — PATIENT INSTRUCTIONS
Bird was seen today for hypothyroidism.     Diagnoses and all orders for this visit:     Essential hypertension  -     Comprehensive Metabolic Panel     Mixed hyperlipidemia  -     Lipid Panel With LDL / HDL Ratio     Hypothyroidism, unspecified type  -     TSH  -     T4, Free     Chronic pain syndrome     Acute URI  -     CBC & Differential              Return in about 3 months (around 3/12/2019) for Recheck.     Costa South MD  12/12/2018

## 2018-12-12 NOTE — PROGRESS NOTES
Bird Spear is a 73 y.o. female, who presents with a chief complaint of   Chief Complaint   Patient presents with   • Hypothyroidism       HPI     74yo female with one week of cough and congestion, is getting some runny eyes. No fever or chills. Feeling slightly fatigued.  No NVD.      She is using mucinex and nyquil at night.    She uses her norco only prn, used only with chores. Here for her house bill check and refill. She understands that this is to be used sparingly.  She does not take daily.     Her BP is well controlled.  No headache, chest pain, etc.   Insomnia controlled with elavil. No new changes there.     The following portions of the patient's history were reviewed and updated as appropriate: allergies, current medications, past family history, past medical history, past social history, past surgical history and problem list.    Allergies: Patient has no known allergies.    Current Outpatient Medications:   •  amitriptyline (ELAVIL) 25 MG tablet, TAKE 1 TABLET BY MOUTH ONCE DAILY IN THE EVENING, Disp: 90 tablet, Rfl: 1  •  aspirin 325 MG EC tablet, Take 325 mg by mouth Daily., Disp: , Rfl:   •  Calcium Carbonate (CALTRATE 600) 1500 (600 CA) MG tablet, Take  by mouth., Disp: , Rfl:   •  Coenzyme Q10 (COQ10) 200 MG capsule, Take 1 capsule by mouth Daily., Disp: , Rfl:   •  diphenhydrAMINE (BENADRYL) 25 MG tablet, Take 25 mg by mouth Daily., Disp: , Rfl:   •  DULoxetine (CYMBALTA) 60 MG capsule, Take 1 capsule by mouth Daily., Disp: 90 capsule, Rfl: 2  •  hydrochlorothiazide (MICROZIDE) 12.5 MG capsule, TAKE 1 CAPSULE BY MOUTH ONCE DAILY, Disp: 90 capsule, Rfl: 1  •  HYDROcodone-acetaminophen (NORCO)  MG per tablet, Take 1 tablet by mouth Every 6 (Six) Hours As Needed for Moderate Pain ., Disp: 120 tablet, Rfl: 0  •  levothyroxine (SYNTHROID, LEVOTHROID) 125 MCG tablet, TAKE 1 TABLET BY MOUTH ONCE DAILY *NEEDS APPOINTMENT PRIOR TO ANY FURTHER REFILLS*, Disp: 90 tablet, Rfl: 1  •   "lovastatin (MEVACOR) 20 MG tablet, Take 1 tablet by mouth Every Night., Disp: 90 tablet, Rfl: 2  •  metoprolol tartrate (LOPRESSOR) 25 MG tablet, Take 1 tablet by mouth 2 (Two) Times a Day., Disp: 180 tablet, Rfl: 2  •  Multiple Vitamin (MULTI VITAMIN) tablet, Take  by mouth., Disp: , Rfl:   •  nystatin (MYCOSTATIN) 996182 UNIT/ML suspension, Swish and swallow 5 mL 4 (Four) Times a Day., Disp: 473 mL, Rfl: 1  •  omeprazole (PriLOSEC) 20 MG capsule, Take 1 capsule by mouth as needed (reflux)., Disp: 90 capsule, Rfl: 2  There are no discontinued medications.    Review of Systems   Constitutional: Positive for fatigue. Negative for chills and fever.   HENT: Positive for congestion.    Respiratory: Positive for cough. Negative for shortness of breath.    Cardiovascular: Negative.  Negative for chest pain.   Gastrointestinal: Negative.    Genitourinary: Negative.    Musculoskeletal: Positive for back pain.   Skin: Negative.    Psychiatric/Behavioral: Positive for sleep disturbance.             /88   Pulse 68   Resp 16   Ht 149.9 cm (59\")   Wt 66.7 kg (147 lb)   SpO2 97%   BMI 29.69 kg/m²       Physical Exam   Constitutional: She is oriented to person, place, and time. She appears well-developed and well-nourished.   HENT:   Head: Normocephalic.   Right Ear: External ear normal.   Mouth/Throat: Oropharynx is clear and moist. No oropharyngeal exudate.   Eyes: Pupils are equal, round, and reactive to light. Right eye exhibits no discharge. Left eye exhibits no discharge.   Neck: Normal range of motion. Neck supple. No thyromegaly present.   Cardiovascular: Normal rate, regular rhythm and normal heart sounds.   No murmur heard.  Pulmonary/Chest: Effort normal. No respiratory distress. She has no wheezes.   Abdominal: Soft.   Musculoskeletal: She exhibits no edema.   Neurological: She is alert and oriented to person, place, and time. No cranial nerve deficit.   Skin: Capillary refill takes less than 2 seconds. "   Psychiatric: She has a normal mood and affect. Her behavior is normal.   Vitals reviewed.      Lab Results (most recent)     None          Results for orders placed or performed in visit on 11/19/18   Tissue Pathology Exam   Result Value Ref Range    Case Report       Surgical Pathology Report                         Case: [id number]                                  Authorizing Provider:  Cecilia bK        Collected:           11/19/2018 11:35 AM                                 MD Daly                                                                   Pathologist:           Michael Garza MD       Received:            11/19/2018 09:35 PM          Specimens:   1) - Large Intestine, Right / Ascending Colon, 1) hepatic flexure polyp                             2) - Large Intestine, Left / Descending Colon, 2) descending colon polyp x2                         3) - Large Intestine, Sigmoid Colon, 3) sigmoid colon polyp x2                                      4) - Large Intestine, Rectum, 4) rectal polyp                                              Clinical Information       Family hx polyps       Final Diagnosis       1. Hepatic Flexure Polyp Biopsy:   A. Hyperplastic polyp.    B. No glandular dysplasia nor malignancy identified.    2. Descending Colon Polyp x2 Biopsy:   A. Fragments of hyperplastic polyp.   B. No glandular dysplasia nor malignancy identified.    3. Sigmoid Colon Polyp x2 Biopsy:   A. Fragments of hyperplastic polyp.   B. No glandular dysplasia nor malignancy identified.    4. Rectal Polyp Biopsy:   A. Hyperplastic polyp.   B. No glandular dysplasia nor malignancy identified.    KIMANIT/monse       Gross Description       1.The specimen is received in formalin labeled with the patient's name and further designated 'hepatic flexure polyp biopsy' are multiple small fragments of gray-tan tissue. The specimen is submitted for embedding as received.    2.The specimen is received in formalin labeled  with the patient's name and further designated 'descending colon polyp x2 biopsy' are multiple small fragments of gray-tan tissue. The specimen is submitted for embedding as received.    3.The specimen is received in formalin labeled with the patient's name and further designated 'sigmoid colon polyp x2 biopsy' are multiple small fragments of gray-tan tissue. The specimen is submitted for embedding as received.    4.The specimen is received in formalin labeled with the patient's name and further designated 'rectal polyp biopsy' is a small fragment of gray-tan tissue. The specimen is submitted for embedding as received.            Microscopic Description       Performed, incorporated in diagnosis.        Hyperplastic polyps reviewed        Bird was seen today for hypothyroidism.    Diagnoses and all orders for this visit:    Essential hypertension  -     Comprehensive Metabolic Panel    Mixed hyperlipidemia  -     Lipid Panel With LDL / HDL Ratio    Hypothyroidism, unspecified type  -     TSH  -     T4, Free    Chronic pain syndrome    Acute URI  -     CBC & Differential        She is going to FU in 3 months for her house bill check.     Continue home BP monitoring  Supportive care for URI, let me  Know of fever, production, etc.   Return in about 3 months (around 3/12/2019) for Recheck.    Costa South MD  12/12/2018

## 2019-01-28 ENCOUNTER — APPOINTMENT (OUTPATIENT)
Dept: WOMENS IMAGING | Facility: HOSPITAL | Age: 74
End: 2019-01-28

## 2019-01-28 PROCEDURE — 77063 BREAST TOMOSYNTHESIS BI: CPT | Performed by: RADIOLOGY

## 2019-01-28 PROCEDURE — 77067 SCR MAMMO BI INCL CAD: CPT | Performed by: RADIOLOGY

## 2019-03-18 ENCOUNTER — OFFICE VISIT (OUTPATIENT)
Dept: INTERNAL MEDICINE | Facility: CLINIC | Age: 74
End: 2019-03-18

## 2019-03-18 VITALS
SYSTOLIC BLOOD PRESSURE: 142 MMHG | RESPIRATION RATE: 16 BRPM | WEIGHT: 149 LBS | BODY MASS INDEX: 30.04 KG/M2 | HEART RATE: 66 BPM | OXYGEN SATURATION: 97 % | TEMPERATURE: 98.1 F | HEIGHT: 59 IN | DIASTOLIC BLOOD PRESSURE: 94 MMHG

## 2019-03-18 DIAGNOSIS — M43.26 FUSION OF SPINE OF LUMBAR REGION: ICD-10-CM

## 2019-03-18 DIAGNOSIS — M19.90 ARTHRITIS: ICD-10-CM

## 2019-03-18 DIAGNOSIS — M48.061 SPINAL STENOSIS OF LUMBAR REGION, UNSPECIFIED WHETHER NEUROGENIC CLAUDICATION PRESENT: Primary | ICD-10-CM

## 2019-03-18 DIAGNOSIS — Z79.899 HIGH RISK MEDICATION USE: ICD-10-CM

## 2019-03-18 DIAGNOSIS — I10 ESSENTIAL HYPERTENSION: ICD-10-CM

## 2019-03-18 DIAGNOSIS — N18.30 CKD (CHRONIC KIDNEY DISEASE) STAGE 3, GFR 30-59 ML/MIN (HCC): ICD-10-CM

## 2019-03-18 DIAGNOSIS — I10 WHITE COAT SYNDROME WITH HYPERTENSION: ICD-10-CM

## 2019-03-18 DIAGNOSIS — E78.2 MIXED HYPERLIPIDEMIA: ICD-10-CM

## 2019-03-18 DIAGNOSIS — M43.10 DEGENERATIVE SPONDYLOLISTHESIS: ICD-10-CM

## 2019-03-18 DIAGNOSIS — I71.20 THORACIC AORTIC ANEURYSM WITHOUT RUPTURE (HCC): ICD-10-CM

## 2019-03-18 DIAGNOSIS — E03.9 HYPOTHYROIDISM, UNSPECIFIED TYPE: ICD-10-CM

## 2019-03-18 PROBLEM — Z12.31 ENCOUNTER FOR SCREENING MAMMOGRAM FOR MALIGNANT NEOPLASM OF BREAST: Status: RESOLVED | Noted: 2017-11-01 | Resolved: 2019-03-18

## 2019-03-18 PROBLEM — R10.9 ABDOMINAL PAIN: Status: RESOLVED | Noted: 2017-01-05 | Resolved: 2019-03-18

## 2019-03-18 PROBLEM — Z12.31 ENCOUNTER FOR SCREENING MAMMOGRAM FOR MALIGNANT NEOPLASM OF BREAST: Status: ACTIVE | Noted: 2017-11-01

## 2019-03-18 PROBLEM — Z92.3 HISTORY OF RADIATION THERAPY: Status: ACTIVE | Noted: 2019-03-18

## 2019-03-18 PROBLEM — Z13.820 SCREENING FOR OSTEOPOROSIS: Status: RESOLVED | Noted: 2018-03-14 | Resolved: 2019-03-18

## 2019-03-18 PROBLEM — R60.9 EDEMA: Status: RESOLVED | Noted: 2017-01-05 | Resolved: 2019-03-18

## 2019-03-18 PROBLEM — K44.9 HIATAL HERNIA: Status: ACTIVE | Noted: 2019-03-18

## 2019-03-18 PROBLEM — N19 RENAL FAILURE: Status: RESOLVED | Noted: 2017-01-05 | Resolved: 2019-03-18

## 2019-03-18 PROBLEM — R06.02 SOB (SHORTNESS OF BREATH): Status: RESOLVED | Noted: 2018-05-25 | Resolved: 2019-03-18

## 2019-03-18 PROCEDURE — 99215 OFFICE O/P EST HI 40 MIN: CPT | Performed by: INTERNAL MEDICINE

## 2019-03-18 RX ORDER — HYDROCODONE BITARTRATE AND ACETAMINOPHEN 10; 325 MG/1; MG/1
1 TABLET ORAL EVERY 8 HOURS PRN
Qty: 90 TABLET | Refills: 0 | Status: SHIPPED | OUTPATIENT
Start: 2019-03-18 | End: 2019-03-20 | Stop reason: SDUPTHER

## 2019-03-18 RX ORDER — DULOXETIN HYDROCHLORIDE 60 MG/1
60 CAPSULE, DELAYED RELEASE ORAL DAILY
Qty: 90 CAPSULE | Refills: 2 | Status: SHIPPED | OUTPATIENT
Start: 2019-03-18 | End: 2019-11-25

## 2019-03-18 NOTE — PROGRESS NOTES
"      Bird Spear is a 73 y.o. female, who presents with a chief complaint of   Chief Complaint   Patient presents with   • Follow-up     3 x month f/u, Dr. South transfer   • Hyperlipidemia       72 yo F here to establish care. She was previous patient of Dr. Singh and has been on hydrocodone since early 2000's due to chronic pain s/p lumbar fusion. She has arthritis as well in her shoulder and hip. She takes 0.5 tablets of 10/325mg 2-3 times per day. She has had injections in her hips and back that have helped in the past. She had reaction to the shot and developed moon facies and heart palpations. She sees Dr. Kilpatrick with [facility].     She had hysterectomy back in 1992 and since that time \"she had goose bumps\" that cesar go all over her body. She feels that \"she has creepy crawlies\" of her neck and hair. She takes 25mg of Elavil at night that helps this feeling. Tolerates well without any issues.     Dr. Squires keeps an eye on her thoracic aneurysm. She has CT yearly that has been fairly stable.     She has HTN and is doing good on BB and HCZT. Her BP runs really good at home. Runs around 118-135/80-90. Feels well on this with no bradycardia.     She has chronic HLD and is doing well on Lovastatin. She takes CoQ10 and does well with this. Occasional myalgias.     She has chronic depression and does well on Cymbalta. She tolerates well and feels that she wants to cry when she doesn't take it. She sleeps well.     She has hypothyroidism and takes 125mcg daily. Feels well on this. No constipation. No cold or heat intolerance.     She has a history of breast cancer in 2015 and had lumpectomy and radiation and he manages her mammograms.     She has CKD stage 3 that is chronic. Does not take NSAID's regularly. Drinks water and tries to stay hydrated.          The following portions of the patient's history were reviewed and updated as appropriate: allergies, current medications, past family history, " past medical history, past social history, past surgical history and problem list.    Allergies: Patient has no known allergies.    Current Outpatient Medications:   •  amitriptyline (ELAVIL) 25 MG tablet, TAKE 1 TABLET BY MOUTH ONCE DAILY IN THE EVENING, Disp: 90 tablet, Rfl: 1  •  aspirin 325 MG EC tablet, Take 325 mg by mouth Daily., Disp: , Rfl:   •  Calcium Carbonate (CALTRATE 600) 1500 (600 CA) MG tablet, Take  by mouth., Disp: , Rfl:   •  Coenzyme Q10 (COQ10) 200 MG capsule, Take 1 capsule by mouth Daily., Disp: , Rfl:   •  DULoxetine (CYMBALTA) 60 MG capsule, Take 1 capsule by mouth Daily., Disp: 90 capsule, Rfl: 2  •  levothyroxine (SYNTHROID, LEVOTHROID) 125 MCG tablet, TAKE 1 TABLET BY MOUTH ONCE DAILY *NEEDS APPOINTMENT PRIOR TO ANY FURTHER REFILLS*, Disp: 90 tablet, Rfl: 1  •  lovastatin (MEVACOR) 20 MG tablet, Take 1 tablet by mouth Every Night., Disp: 90 tablet, Rfl: 2  •  metoprolol tartrate (LOPRESSOR) 25 MG tablet, Take 1 tablet by mouth 2 (Two) Times a Day., Disp: 180 tablet, Rfl: 2  •  Multiple Vitamin (MULTI VITAMIN) tablet, Take  by mouth., Disp: , Rfl:   •  omeprazole (PriLOSEC) 20 MG capsule, Take 1 capsule by mouth as needed (reflux)., Disp: 90 capsule, Rfl: 2  •  HYDROcodone-acetaminophen (NORCO)  MG per tablet, Take one po every  8 hours prn, Disp: 90 tablet, Rfl: 0  Medications Discontinued During This Encounter   Medication Reason   • nystatin (MYCOSTATIN) 003526 UNIT/ML suspension *Therapy completed   • diphenhydrAMINE (BENADRYL) 25 MG tablet *Therapy completed   • hydrochlorothiazide (MICROZIDE) 12.5 MG capsule *Therapy completed   • HYDROcodone-acetaminophen (NORCO)  MG per tablet Reorder   • DULoxetine (CYMBALTA) 60 MG capsule Reorder       Review of Systems   Constitutional: Negative for chills and fatigue.   HENT: Negative for congestion and rhinorrhea.    Respiratory: Negative for cough and shortness of breath.    Cardiovascular: Negative for chest pain, palpitations  "and leg swelling.   Gastrointestinal: Negative for abdominal pain, diarrhea and nausea.   Genitourinary: Negative for difficulty urinating and dysuria.   Musculoskeletal: Positive for back pain. Negative for arthralgias.   Skin: Negative for rash.   Neurological: Negative for dizziness and headaches.   Hematological: Negative for adenopathy.   Psychiatric/Behavioral: Negative for decreased concentration, dysphoric mood and sleep disturbance.             /94 (BP Location: Left arm, Patient Position: Sitting, Cuff Size: Adult)   Pulse 66   Temp 98.1 °F (36.7 °C) (Oral)   Resp 16   Ht 149.9 cm (59\")   Wt 67.6 kg (149 lb)   SpO2 97%   BMI 30.09 kg/m²       Physical Exam   Constitutional: She is oriented to person, place, and time. She appears well-developed and well-nourished. No distress.   HENT:   Head: Normocephalic and atraumatic.   Right Ear: External ear normal.   Left Ear: External ear normal.   Mouth/Throat: Oropharynx is clear and moist. No oropharyngeal exudate.   Eyes: Conjunctivae are normal. Right eye exhibits no discharge. Left eye exhibits no discharge. No scleral icterus.   Neck: Neck supple.   Cardiovascular: Normal rate, regular rhythm and normal heart sounds. Exam reveals no gallop and no friction rub.   No murmur heard.  Pulmonary/Chest: Effort normal and breath sounds normal. No respiratory distress. She has no wheezes. She has no rales.   Abdominal: Soft. Bowel sounds are normal. She exhibits no distension and no mass. There is no tenderness. There is no guarding.   Lymphadenopathy:     She has no cervical adenopathy.   Neurological: She is alert and oriented to person, place, and time.   Skin: Skin is warm. No rash noted.   Psychiatric: She has a normal mood and affect. Her behavior is normal.   Nursing note and vitals reviewed.        Results for orders placed or performed in visit on 12/12/18   Comprehensive Metabolic Panel   Result Value Ref Range    Glucose 98 65 - 99 mg/dL    BUN " 13 8 - 23 mg/dL    Creatinine 0.95 0.57 - 1.00 mg/dL    eGFR Non African Am 58 (L) >60 mL/min/1.73    eGFR African Am 70 >60 mL/min/1.73    BUN/Creatinine Ratio 13.7 7.0 - 25.0    Sodium 143 136 - 145 mmol/L    Potassium 4.0 3.5 - 5.2 mmol/L    Chloride 100 98 - 107 mmol/L    Total CO2 29.0 22.0 - 29.0 mmol/L    Calcium 9.7 8.8 - 10.5 mg/dL    Total Protein 7.1 6.0 - 8.5 g/dL    Albumin 4.60 3.50 - 5.20 g/dL    Globulin 2.5 gm/dL    A/G Ratio 1.8 g/dL    Total Bilirubin 0.3 0.2 - 1.2 mg/dL    Alkaline Phosphatase 73 40 - 129 U/L    AST (SGOT) 23 5 - 32 U/L    ALT (SGPT) 15 5 - 33 U/L   TSH   Result Value Ref Range    TSH 0.422 0.270 - 4.200 mIU/mL   T4, Free   Result Value Ref Range    Free T4 1.27 0.93 - 1.70 ng/dL   Lipid Panel With LDL / HDL Ratio   Result Value Ref Range    Total Cholesterol 286 (H) 0 - 200 mg/dL    Triglycerides 118 0 - 150 mg/dL    HDL Cholesterol 53 40 - 60 mg/dL    VLDL Cholesterol 23.6 7 - 27 mg/dL    LDL Cholesterol  209 (H) 0 - 100 mg/dL    LDL/HDL Ratio 3.95    CBC & Differential   Result Value Ref Range    WBC 6.13 4.80 - 10.80 10*3/mm3    RBC 4.23 4.20 - 5.40 10*6/mm3    Hemoglobin 12.9 12.0 - 16.0 g/dL    Hematocrit 39.1 37.0 - 47.0 %    MCV 92.4 81.0 - 99.0 fL    MCH 30.5 27.0 - 31.0 pg    MCHC 33.0 31.0 - 37.0 g/dL    RDW 13.7 11.5 - 14.5 %    Platelets 319 140 - 500 10*3/mm3    Neutrophil Rel % 63.2 45.0 - 70.0 %    Lymphocyte Rel % 18.8 (L) 20.0 - 45.0 %    Monocyte Rel % 13.7 (H) 3.0 - 8.0 %    Eosinophil Rel % 3.3 0.0 - 4.0 %    Basophil Rel % 0.7 0.0 - 2.0 %    Neutrophils Absolute 3.88 1.50 - 8.30 10*3/mm3    Lymphocytes Absolute 1.15 0.60 - 4.80 10*3/mm3    Monocytes Absolute 0.84 0.00 - 1.00 10*3/mm3    Eosinophils Absolute 0.20 0.10 - 0.30 10*3/mm3    Basophils Absolute 0.04 0.00 - 0.20 10*3/mm3    Immature Granulocyte Rel % 0.3 0.0 - 0.5 %    Immature Grans Absolute 0.02 0.00 - 0.03 10*3/mm3    nRBC 0.0 0.0 - 0.0 /100 WBC           Bird was seen today for follow-up and  hyperlipidemia.    Diagnoses and all orders for this visit:    Spinal stenosis of lumbar region, unspecified whether neurogenic claudication present  -     Ambulatory Referral to Pain Management  -     Pain Management Profile (13 Drugs) Urine - Urine, Clean Catch    High risk medication use  -     Pain Management Profile (13 Drugs) Urine - Urine, Clean Catch    Essential hypertension    Mixed hyperlipidemia    Thoracic aortic aneurysm, without rupture    Hypothyroidism, unspecified type    CKD (chronic kidney disease) stage 3, GFR 30-59 ml/min (CMS/[facility])    Arthritis  -     Discontinue: HYDROcodone-acetaminophen (NORCO)  MG per tablet; Take 1 tablet by mouth Every 8 (Eight) Hours As Needed for Moderate Pain  or Severe Pain . 0.5-1 tablet PO q8h PRN pain  -     DULoxetine (CYMBALTA) 60 MG capsule; Take 1 capsule by mouth Daily.    White coat syndrome with hypertension    Fusion of spine of lumbar region  -     Ambulatory Referral to Pain Management    Degenerative spondylolisthesis  -     Ambulatory Referral to Pain Management      I am going to refer her to pain management for her narcotics. Pain contract today with me as well as UDS. I gave her enough for 1 month today.     HTN is under good control at home and patient will continue to monitor with home BP cuff. No side effects from medications. Will continue current regimen of BB and will stop her HCZT and will call me in 7 days to let me know how she is doing off of this. I hope that she does well off of this, but may not tolerate. Goal BP around 130-140/80. Will follow up in 3 months      Patient's cholesterol is under good control. Will continue current regimen of Lovastatin. No side effects from medications reported. Will follow up in 3 months to monitor levels.     TSH has been stable and patient is doing well. Will continue current regimen of levothyroxine 125 mcg and follow up levels in 3 months.     CKD is stable and she is avoiding NSAID's. Push  fluids.     Spent 50% of 40 minutes in counseling regarding her pain medication, her HTN and HLD as well as her thyroid.     Return in about 3 months (around 6/18/2019) for Recheck.    Theresa Paul MD  03/18/2019

## 2019-03-20 ENCOUNTER — TELEPHONE (OUTPATIENT)
Dept: INTERNAL MEDICINE | Facility: CLINIC | Age: 74
End: 2019-03-20

## 2019-03-20 DIAGNOSIS — M19.90 ARTHRITIS: ICD-10-CM

## 2019-03-20 RX ORDER — HYDROCODONE BITARTRATE AND ACETAMINOPHEN 10; 325 MG/1; MG/1
TABLET ORAL
Qty: 90 TABLET | Refills: 0 | Status: SHIPPED | OUTPATIENT
Start: 2019-03-20 | End: 2019-03-22 | Stop reason: SDUPTHER

## 2019-03-20 NOTE — TELEPHONE ENCOUNTER
Called to [facility]  [facility]      ----- Message from Theresa Paul MD sent at 3/20/2019  1:14 PM EDT -----  Regarding: RE:  I think it should be 1 tablet q8h PRN and I have her #90  ----- Message -----  From: Tish Turner MA  Sent: 3/20/2019  11:47 AM  To: Theresa Paul MD    [facility]   10/325mg  That was sent to them,   They need clarification on direction, as [facility]     [phone]    Ref.  #   [id number]

## 2019-03-22 DIAGNOSIS — M19.90 ARTHRITIS: ICD-10-CM

## 2019-03-22 RX ORDER — HYDROCODONE BITARTRATE AND ACETAMINOPHEN 10; 325 MG/1; MG/1
TABLET ORAL
Qty: 90 TABLET | Refills: 0 | Status: SHIPPED | OUTPATIENT
Start: 2019-03-22 | End: 2019-06-18 | Stop reason: SDUPTHER

## 2019-03-26 ENCOUNTER — TELEPHONE (OUTPATIENT)
Dept: INTERNAL MEDICINE | Facility: CLINIC | Age: 74
End: 2019-03-26

## 2019-05-29 ENCOUNTER — OFFICE VISIT (OUTPATIENT)
Dept: CARDIOLOGY | Facility: CLINIC | Age: 74
End: 2019-05-29

## 2019-05-29 VITALS
SYSTOLIC BLOOD PRESSURE: 138 MMHG | BODY MASS INDEX: 30.04 KG/M2 | DIASTOLIC BLOOD PRESSURE: 84 MMHG | HEART RATE: 65 BPM | WEIGHT: 149 LBS | HEIGHT: 59 IN

## 2019-05-29 DIAGNOSIS — N18.30 CKD (CHRONIC KIDNEY DISEASE) STAGE 3, GFR 30-59 ML/MIN (HCC): ICD-10-CM

## 2019-05-29 DIAGNOSIS — I71.20 THORACIC AORTIC ANEURYSM WITHOUT RUPTURE (HCC): Primary | ICD-10-CM

## 2019-05-29 DIAGNOSIS — I10 ESSENTIAL HYPERTENSION: ICD-10-CM

## 2019-05-29 PROCEDURE — 99214 OFFICE O/P EST MOD 30 MIN: CPT | Performed by: INTERNAL MEDICINE

## 2019-05-29 PROCEDURE — 93000 ELECTROCARDIOGRAM COMPLETE: CPT | Performed by: INTERNAL MEDICINE

## 2019-05-29 NOTE — PROGRESS NOTES
Date of Office Visit: 2019  Encounter Provider: Giulia Squires MD  Place of Service: [facility] CARDIOLOGY  Patient Name: Bird Spear  :1945    Chief complaint  Followup of thoracic aortic aneurysm, mild coronary artery disease and palpitations    History of Present Illness  Patient is a pleasant 73-year-old female with history of hypertension, hyperlipidemia, renal insufficiency, hypothyroidism.  In  she was found to have a mildly dilated aorta.  This is been followed with serial imaging studies.  She had an echocardiogram in 2016 that showed normal systolic function no significant valvular disease.  She had a nuclear stress test in 2014 that was negative for ischemia.  She had a noncontrast CT of her chest in 2015 that showed a slight increase in the ascending thoracic aortic size forms 3.7-3.9 cm. CT chest without contrast in 2017 revealed mild calcified plaque in the LAD with ectasia of the ascending measuring 3.9 cm is unchanged.  There was also moderate to large sized hiatal hernia breast changes were noted suspicious for her surgery and postop changes.  Hepatic cyst was noted.  Follow-up CT scan of the chest without contrast in 2018 showed a stable asending aorta dilated to 3.9 cm.     Since last visit blood pressures been lower typically in the 120s.  She is walking but is somewhat limited by hip pain.  She is only able to walk a half a mile.  She denies any chest pain, palpitations syncope near syncope she has mild dependent edema that is been present for many years.  In March hydrochlorothiazide was discontinued and she does not recall any change in her blood pressure readings.  Overall she feels quite well.    Past Medical History:   Diagnosis Date   • Abdominal pain    • Abnormal electrocardiogram    • Arthritis 2016   • Arthritis of neck    • Chronic pain syndrome 2016   • Colon polyp    • Depression    • Edema    •  Essential hypertension 2016   • Fatigue    • Hematuria 2017   • Hx of radiation therapy    • Hyperlipidemia 2016   • Hypertension    • Hypothyroidism    • Hypothyroidism (acquired) 2016   • Lumbosacral disc disease    • Malignant neoplasm of breast (CMS/HCC)    • Renal failure    • Scoliosis    • Screening for osteoporosis 3/14/2018   • Shortness of breath    • Thoracic aortic aneurysm (CMS/HCC)    • Thyroid disease      Past Surgical History:   Procedure Laterality Date   • BACK SURGERY     • BREAST LUMPECTOMY Right    •  SECTION     • HYSTERECTOMY     • SPINAL FUSION       Outpatient Medications Prior to Visit   Medication Sig Dispense Refill   • amitriptyline (ELAVIL) 25 MG tablet TAKE 1 TABLET BY MOUTH ONCE DAILY IN THE EVENING 90 tablet 1   • aspirin 325 MG EC tablet Take 325 mg by mouth Daily.     • Calcium Carbonate (CALTRATE 600) 1500 (600 CA) MG tablet Take  by mouth.     • Coenzyme Q10 (COQ10) 200 MG capsule Take 1 capsule by mouth Daily.     • DULoxetine (CYMBALTA) 60 MG capsule Take 1 capsule by mouth Daily. 90 capsule 2   • HYDROcodone-acetaminophen (NORCO)  MG per tablet Take one po every  8 hours prn 90 tablet 0   • levothyroxine (SYNTHROID, LEVOTHROID) 125 MCG tablet TAKE 1 TABLET BY MOUTH ONCE DAILY *NEEDS APPOINTMENT PRIOR TO ANY FURTHER REFILLS* 90 tablet 1   • lovastatin (MEVACOR) 20 MG tablet Take 1 tablet by mouth Every Night. 90 tablet 2   • Multiple Vitamin (MULTI VITAMIN) tablet Take  by mouth.     • omeprazole (PriLOSEC) 20 MG capsule Take 1 capsule by mouth as needed (reflux). 90 capsule 2   • metoprolol tartrate (LOPRESSOR) 25 MG tablet Take 1 tablet by mouth 2 (Two) Times a Day. 180 tablet 2     No facility-administered medications prior to visit.        Allergies as of 2019   • (No Known Allergies)     Social History     Socioeconomic History   • Marital status:      Spouse name: Not on file   • Number of children: Not on file   • Years of  "education: Not on file   • Highest education level: Not on file   Tobacco Use   • Smoking status: Never Smoker   Substance and Sexual Activity   • Alcohol use: No     Comment: caffeine use     Family History   Problem Relation Age of Onset   • Heart disease Mother    • Hyperlipidemia Mother    • Diabetes Mother    • Hypertension Mother    • Heart disease Father         Coronary artery disease   • Hyperlipidemia Father    • Hypertension Father    • Heart disease Sister    • Hypertension Other      Review of Systems   Constitution: Negative for fever, malaise/fatigue, weight gain and weight loss.   HENT: Negative for ear pain, hearing loss, nosebleeds and sore throat.    Eyes: Negative for double vision, pain, vision loss in left eye and vision loss in right eye.   Cardiovascular:        See history of present illness.   Respiratory: Negative for cough, shortness of breath, sleep disturbances due to breathing, snoring and wheezing.    Endocrine: Negative for cold intolerance, heat intolerance and polyuria.   Skin: Negative for itching, poor wound healing and rash.   Musculoskeletal: Positive for joint pain and myalgias. Negative for joint swelling.   Gastrointestinal: Negative for abdominal pain, diarrhea, hematochezia, nausea and vomiting.   Genitourinary: Negative for hematuria and hesitancy.   Neurological: Negative for numbness, paresthesias and seizures.   Psychiatric/Behavioral: Negative for depression. The patient is not nervous/anxious.         Objective:     Vitals:    05/29/19 1046   BP: 138/84   Pulse: 65   Weight: 67.6 kg (149 lb)   Height: 149.9 cm (59\")     Body mass index is 30.09 kg/m².    Physical Exam   Constitutional: She is oriented to person, place, and time. She appears well-developed and well-nourished.   Obese   HENT:   Head: Normocephalic.   Nose: Nose normal.   Mouth/Throat: Oropharynx is clear and moist.   Eyes: Conjunctivae and EOM are normal. Pupils are equal, round, and reactive to light. " Right eye exhibits no discharge. No scleral icterus.   Neck: Normal range of motion. Neck supple. No JVD present. No thyromegaly present.   Cardiovascular: Normal rate, regular rhythm, normal heart sounds and intact distal pulses. Exam reveals no gallop and no friction rub.   No murmur heard.  Pulses:       Carotid pulses are 2+ on the right side, and 2+ on the left side.       Radial pulses are 2+ on the right side, and 2+ on the left side.        Femoral pulses are 2+ on the right side, and 2+ on the left side.       Popliteal pulses are 2+ on the right side, and 2+ on the left side.        Dorsalis pedis pulses are 2+ on the right side, and 2+ on the left side.        Posterior tibial pulses are 2+ on the right side, and 2+ on the left side.   Pulmonary/Chest: Effort normal and breath sounds normal. No respiratory distress. She has no wheezes. She has no rales.   Abdominal: Soft. Bowel sounds are normal. She exhibits no distension. There is no hepatosplenomegaly. There is no tenderness. There is no rebound.   Musculoskeletal: Normal range of motion. She exhibits no edema or tenderness.   Neurological: She is alert and oriented to person, place, and time.   Skin: Skin is warm and dry. No rash noted. No erythema.   Psychiatric: She has a normal mood and affect. Her behavior is normal. Judgment and thought content normal.   Vitals reviewed.    Lab Review:     ECG 12 Lead  Date/Time: 5/29/2019 10:53 AM  Performed by: Giulia Squires MD  Authorized by: Giulia Squires MD   Comparison: compared with previous ECG   Similar to previous ECG  Rhythm: sinus rhythm  Other findings: non-specific ST-T wave changes    Clinical impression: abnormal EKG          Assessment:       Diagnosis Plan   1. Thoracic aortic aneurysm, without rupture     2. Essential hypertension  ECG 12 Lead    metoprolol tartrate (LOPRESSOR) 25 MG tablet   3. CKD (chronic kidney disease) stage 3, GFR 30-59 ml/min (CMS/[facility])       Plan:       1.  Ascending  aortic aneurysm.  Needs better control of hypertension.  Will increase metoprolol as below.  Plan on repeat imaging in 6 months.  2.  Hypertension.  She discontinued hydrochlorothiazide in March unclear but states that Dr. Paul recommended that she do so.  Blood pressure was relatively stable despite this with only slight increase in diastolic pressures.  It is further elevated today and she brings with her home readings most of which are in the upper 120s to low 130s diastolics in the 70s and 80s with this and.  With this in mind, we will increase metoprolol tartrate to 37.5 mg twice daily.  Salt diet and cannot exercise further due to bursitis  3.  Breast cancer.  4.  Renal insufficiency, stable.  5.  Probable hepatic cyst.  6.  Dyslipidemia.  7.  Coronary artery disease by CT scan imaging no anginal symptoms.  8.  Right hip bursitis.  This is limiting her activities.  Additional recommendations per Dr. Paul    Coronary Artery Disease  Assessment  • The patient has no angina    Plan  • Lifestyle modifications discussed include adhering to a heart healthy diet    Subjective - Objective  • Current antiplatelet therapy includes aspirin 325 mg         Your medication list           Accurate as of 5/29/19 11:59 PM. If you have any questions, ask your nurse or doctor.               CHANGE how you take these medications      Instructions Last Dose Given Next Dose Due   metoprolol tartrate 25 MG tablet  Commonly known as:  LOPRESSOR  What changed:    · how much to take  · when to take this  Changed by:  Giulia Squires MD      Take 1.5 tablets by mouth 2 (Two) Times a Day.          CONTINUE taking these medications      Instructions Last Dose Given Next Dose Due   amitriptyline 25 MG tablet  Commonly known as:  ELAVIL      TAKE 1 TABLET BY MOUTH ONCE DAILY IN THE EVENING       aspirin 325 MG EC tablet      Take 325 mg by mouth Daily.       CALTRATE 600 1500 (600 Ca) MG tablet  Generic drug:  Calcium Carbonate      Take   by mouth.       CoQ10 200 MG capsule      Take 1 capsule by mouth Daily.       DULoxetine 60 MG capsule  Commonly known as:  CYMBALTA      Take 1 capsule by mouth Daily.       HYDROcodone-acetaminophen  MG per tablet  Commonly known as:  NORCO      Take one po every  8 hours prn       levothyroxine 125 MCG tablet  Commonly known as:  SYNTHROID, LEVOTHROID      TAKE 1 TABLET BY MOUTH ONCE DAILY *NEEDS APPOINTMENT PRIOR TO ANY FURTHER REFILLS*       lovastatin 20 MG tablet  Commonly known as:  MEVACOR      Take 1 tablet by mouth Every Night.       Multi Vitamin tablet      Take  by mouth.       omeprazole 20 MG capsule  Commonly known as:  priLOSEC      Take 1 capsule by mouth as needed (reflux).             Where to Get Your Medications      These medications were sent to [facility]  for [facility] - [place], [place] - [address] [place] [address] - [phone] [facility] [phone] [address] [address]    Phone:  [phone]   · metoprolol tartrate 25 MG tablet         Dictated utilizing Dragon dictation

## 2019-05-30 PROBLEM — I25.10 CORONARY ARTERY DISEASE INVOLVING NATIVE CORONARY ARTERY OF NATIVE HEART WITHOUT ANGINA PECTORIS: Status: ACTIVE | Noted: 2019-05-30

## 2019-06-04 DIAGNOSIS — G47.00 INSOMNIA, UNSPECIFIED TYPE: ICD-10-CM

## 2019-06-04 RX ORDER — AMITRIPTYLINE HYDROCHLORIDE 25 MG/1
25 TABLET, FILM COATED ORAL EVERY EVENING
Qty: 90 TABLET | Refills: 1 | Status: SHIPPED | OUTPATIENT
Start: 2019-06-04 | End: 2019-11-25

## 2019-06-12 DIAGNOSIS — R94.31 ABNORMAL ELECTROCARDIOGRAM: ICD-10-CM

## 2019-06-12 DIAGNOSIS — E78.2 MIXED HYPERLIPIDEMIA: ICD-10-CM

## 2019-06-12 DIAGNOSIS — Z79.899 HIGH RISK MEDICATION USE: ICD-10-CM

## 2019-06-12 DIAGNOSIS — I10 ESSENTIAL HYPERTENSION: Primary | ICD-10-CM

## 2019-06-12 DIAGNOSIS — E03.9 HYPOTHYROIDISM, UNSPECIFIED TYPE: ICD-10-CM

## 2019-06-13 ENCOUNTER — LAB (OUTPATIENT)
Dept: INTERNAL MEDICINE | Facility: CLINIC | Age: 74
End: 2019-06-13

## 2019-06-13 DIAGNOSIS — I10 ESSENTIAL HYPERTENSION: ICD-10-CM

## 2019-06-13 DIAGNOSIS — E03.9 HYPOTHYROIDISM, UNSPECIFIED TYPE: ICD-10-CM

## 2019-06-13 DIAGNOSIS — E78.2 MIXED HYPERLIPIDEMIA: ICD-10-CM

## 2019-06-13 DIAGNOSIS — Z79.899 HIGH RISK MEDICATION USE: ICD-10-CM

## 2019-06-14 LAB
ALBUMIN SERPL-MCNC: 4.6 G/DL (ref 3.5–5.2)
ALBUMIN/GLOB SERPL: 1.9 G/DL
ALP SERPL-CCNC: 71 U/L (ref 39–117)
ALT SERPL-CCNC: 17 U/L (ref 1–33)
AST SERPL-CCNC: 23 U/L (ref 1–32)
BASOPHILS # BLD AUTO: 0.03 10*3/MM3 (ref 0–0.2)
BASOPHILS NFR BLD AUTO: 0.8 % (ref 0–1.5)
BILIRUB SERPL-MCNC: 0.5 MG/DL (ref 0.2–1.2)
BUN SERPL-MCNC: 22 MG/DL (ref 8–23)
BUN/CREAT SERPL: 23.9 (ref 7–25)
CALCIUM SERPL-MCNC: 9.6 MG/DL (ref 8.6–10.5)
CHLORIDE SERPL-SCNC: 101 MMOL/L (ref 98–107)
CHOLEST SERPL-MCNC: 208 MG/DL (ref 0–200)
CO2 SERPL-SCNC: 30.3 MMOL/L (ref 22–29)
CREAT SERPL-MCNC: 0.92 MG/DL (ref 0.57–1)
EOSINOPHIL # BLD AUTO: 0.12 10*3/MM3 (ref 0–0.4)
EOSINOPHIL NFR BLD AUTO: 3.1 % (ref 0.3–6.2)
ERYTHROCYTE [DISTWIDTH] IN BLOOD BY AUTOMATED COUNT: 14.1 % (ref 12.3–15.4)
GLOBULIN SER CALC-MCNC: 2.4 GM/DL
GLUCOSE SERPL-MCNC: 98 MG/DL (ref 65–99)
HCT VFR BLD AUTO: 41.6 % (ref 34–46.6)
HDLC SERPL-MCNC: 66 MG/DL (ref 40–60)
HGB BLD-MCNC: 12.9 G/DL (ref 12–15.9)
IMM GRANULOCYTES # BLD AUTO: 0.01 10*3/MM3 (ref 0–0.05)
IMM GRANULOCYTES NFR BLD AUTO: 0.3 % (ref 0–0.5)
LDLC SERPL CALC-MCNC: 123 MG/DL (ref 0–100)
LDLC/HDLC SERPL: 1.86 {RATIO}
LYMPHOCYTES # BLD AUTO: 0.93 10*3/MM3 (ref 0.7–3.1)
LYMPHOCYTES NFR BLD AUTO: 23.7 % (ref 19.6–45.3)
MCH RBC QN AUTO: 29.1 PG (ref 26.6–33)
MCHC RBC AUTO-ENTMCNC: 31 G/DL (ref 31.5–35.7)
MCV RBC AUTO: 93.9 FL (ref 79–97)
MONOCYTES # BLD AUTO: 0.52 10*3/MM3 (ref 0.1–0.9)
MONOCYTES NFR BLD AUTO: 13.3 % (ref 5–12)
NEUTROPHILS # BLD AUTO: 2.31 10*3/MM3 (ref 1.7–7)
NEUTROPHILS NFR BLD AUTO: 58.8 % (ref 42.7–76)
NRBC BLD AUTO-RTO: 0 /100 WBC (ref 0–0.2)
PLATELET # BLD AUTO: 260 10*3/MM3 (ref 140–450)
POTASSIUM SERPL-SCNC: 4.2 MMOL/L (ref 3.5–5.2)
PROT SERPL-MCNC: 7 G/DL (ref 6–8.5)
RBC # BLD AUTO: 4.43 10*6/MM3 (ref 3.77–5.28)
SODIUM SERPL-SCNC: 144 MMOL/L (ref 136–145)
T4 FREE SERPL-MCNC: 1.2 NG/DL (ref 0.93–1.7)
TRIGL SERPL-MCNC: 95 MG/DL (ref 0–150)
TSH SERPL DL<=0.005 MIU/L-ACNC: 1.08 MIU/ML (ref 0.27–4.2)
VLDLC SERPL CALC-MCNC: 19 MG/DL
WBC # BLD AUTO: 3.92 10*3/MM3 (ref 3.4–10.8)

## 2019-06-18 ENCOUNTER — OFFICE VISIT (OUTPATIENT)
Dept: INTERNAL MEDICINE | Facility: CLINIC | Age: 74
End: 2019-06-18

## 2019-06-18 VITALS
OXYGEN SATURATION: 98 % | HEART RATE: 63 BPM | WEIGHT: 148 LBS | SYSTOLIC BLOOD PRESSURE: 132 MMHG | BODY MASS INDEX: 29.84 KG/M2 | TEMPERATURE: 97.9 F | RESPIRATION RATE: 16 BRPM | DIASTOLIC BLOOD PRESSURE: 82 MMHG | HEIGHT: 59 IN

## 2019-06-18 DIAGNOSIS — N18.30 CKD (CHRONIC KIDNEY DISEASE) STAGE 3, GFR 30-59 ML/MIN (HCC): ICD-10-CM

## 2019-06-18 DIAGNOSIS — G47.00 INSOMNIA, UNSPECIFIED TYPE: ICD-10-CM

## 2019-06-18 DIAGNOSIS — I71.20 THORACIC AORTIC ANEURYSM WITHOUT RUPTURE (HCC): ICD-10-CM

## 2019-06-18 DIAGNOSIS — E78.2 MIXED HYPERLIPIDEMIA: ICD-10-CM

## 2019-06-18 DIAGNOSIS — R31.9 HEMATURIA, UNSPECIFIED TYPE: ICD-10-CM

## 2019-06-18 DIAGNOSIS — I10 ESSENTIAL HYPERTENSION: Primary | ICD-10-CM

## 2019-06-18 LAB
BILIRUB BLD-MCNC: NEGATIVE MG/DL
CLARITY, POC: CLEAR
COLOR UR: YELLOW
GLUCOSE UR STRIP-MCNC: NEGATIVE MG/DL
KETONES UR QL: NEGATIVE
LEUKOCYTE EST, POC: NEGATIVE
NITRITE UR-MCNC: NEGATIVE MG/ML
PH UR: 6 [PH] (ref 5–8)
PROT UR STRIP-MCNC: NEGATIVE MG/DL
RBC # UR STRIP: ABNORMAL /UL
SP GR UR: 1.02 (ref 1–1.03)
UROBILINOGEN UR QL: NORMAL

## 2019-06-18 PROCEDURE — 81003 URINALYSIS AUTO W/O SCOPE: CPT | Performed by: INTERNAL MEDICINE

## 2019-06-18 PROCEDURE — 99214 OFFICE O/P EST MOD 30 MIN: CPT | Performed by: INTERNAL MEDICINE

## 2019-06-18 RX ORDER — HYDROCODONE BITARTRATE AND ACETAMINOPHEN 7.5; 325 MG/1; MG/1
1 TABLET ORAL EVERY 6 HOURS PRN
COMMUNITY
Start: 2019-05-31

## 2019-06-18 RX ORDER — CETIRIZINE HYDROCHLORIDE 10 MG/1
10 TABLET ORAL AS NEEDED
COMMUNITY

## 2019-06-18 NOTE — PROGRESS NOTES
Bird Spear is a 73 y.o. female, who presents with a chief complaint of   Chief Complaint   Patient presents with   • Follow-up     3 x month f/u, lab results   • Hypertension       72 yo F here for follow up and doing well. She was started on increase dose of Metroprolol after stopping HCZT. Her dry mouth and increase urination has helped. No dizziness with increase in BB. Feeling well on change. Running around 110-115/60-70 a home.     She is seeing Dr. Sandro Madison for her pain management. Feels good seeing him. He is prescribing her Hydrocodone.     She has depression/pain and insomnia and is taking Elavil as well as Cymbalta for her mood and feels well on this.     She has chronic hypothyroidism and is doing well on synthroid. Reviewed labs and they look very good.     She is doing well on Lovastatin as well. Lipid panel is much better. No CP or myalgias. Good exercise tolerance. Mostly limited due to pain.          The following portions of the patient's history were reviewed and updated as appropriate: allergies, current medications, past family history, past medical history, past social history, past surgical history and problem list.    Allergies: Patient has no known allergies.    Current Outpatient Medications:   •  amitriptyline (ELAVIL) 25 MG tablet, Take 1 tablet by mouth Every Evening., Disp: 90 tablet, Rfl: 1  •  aspirin 325 MG EC tablet, Take 325 mg by mouth Daily., Disp: , Rfl:   •  Calcium Carbonate (CALTRATE 600) 1500 (600 CA) MG tablet, Take  by mouth., Disp: , Rfl:   •  cetirizine (zyrTEC) 10 MG tablet, Take 10 mg by mouth As Needed for Allergies., Disp: , Rfl:   •  Coenzyme Q10 (COQ10) 200 MG capsule, Take 1 capsule by mouth Daily., Disp: , Rfl:   •  DULoxetine (CYMBALTA) 60 MG capsule, Take 1 capsule by mouth Daily., Disp: 90 capsule, Rfl: 2  •  HYDROcodone-acetaminophen (NORCO) 7.5-325 MG per tablet, Take 1 tablet by mouth Every 6 (Six) Hours As Needed., Disp: , Rfl:   •   "levothyroxine (SYNTHROID, LEVOTHROID) 125 MCG tablet, TAKE 1 TABLET BY MOUTH ONCE DAILY *NEEDS APPOINTMENT PRIOR TO ANY FURTHER REFILLS*, Disp: 90 tablet, Rfl: 1  •  lovastatin (MEVACOR) 20 MG tablet, Take 1 tablet by mouth Every Night., Disp: 90 tablet, Rfl: 2  •  metoprolol tartrate (LOPRESSOR) 25 MG tablet, Take 1.5 tablets by mouth 2 (Two) Times a Day., Disp: 270 tablet, Rfl: 2  •  Multiple Vitamin (MULTI VITAMIN) tablet, Take  by mouth., Disp: , Rfl:   •  omeprazole (PriLOSEC) 20 MG capsule, Take 1 capsule by mouth as needed (reflux)., Disp: 90 capsule, Rfl: 2  Medications Discontinued During This Encounter   Medication Reason   • HYDROcodone-acetaminophen (NORCO)  MG per tablet Duplicate order       Review of Systems   Constitutional: Negative for chills, fatigue and fever.   HENT: Negative for congestion and rhinorrhea.    Respiratory: Negative for cough and shortness of breath.    Gastrointestinal: Negative for abdominal pain, constipation, diarrhea and nausea.             /82 (BP Location: Left arm, Patient Position: Sitting, Cuff Size: Adult)   Pulse 63   Temp 97.9 °F (36.6 °C) (Oral)   Resp 16   Ht 149.9 cm (59\")   Wt 67.1 kg (148 lb)   SpO2 98%   BMI 29.89 kg/m²       Physical Exam   Constitutional: She is oriented to person, place, and time. She appears well-developed and well-nourished. No distress.   HENT:   Head: Normocephalic and atraumatic.   Right Ear: Hearing, tympanic membrane, external ear and ear canal normal.   Left Ear: Hearing, tympanic membrane, external ear and ear canal normal.   Nose: Nose normal.   Mouth/Throat: Uvula is midline, oropharynx is clear and moist and mucous membranes are normal. No oropharyngeal exudate, posterior oropharyngeal edema or posterior oropharyngeal erythema. Tonsils are 0 on the right. Tonsils are 0 on the left. No tonsillar exudate.   Eyes: Conjunctivae are normal. Right eye exhibits no discharge. Left eye exhibits no discharge. No scleral " icterus.   Neck: Neck supple.   Cardiovascular: Normal rate, regular rhythm and normal heart sounds. Exam reveals no gallop and no friction rub.   No murmur heard.  Pulmonary/Chest: Effort normal and breath sounds normal. No respiratory distress. She has no wheezes. She has no rales.   Lymphadenopathy:     She has no cervical adenopathy.   Neurological: She is alert and oriented to person, place, and time.   Skin: Skin is warm. No rash noted.   Psychiatric: She has a normal mood and affect. Her behavior is normal.   Nursing note and vitals reviewed.        Results for orders placed or performed in visit on 06/18/19   POCT urinalysis dipstick, automated   Result Value Ref Range    Color Yellow Yellow, Straw, Dark Yellow, Sara    Clarity, UA Clear Clear    Specific Gravity  1.020 1.005 - 1.030    pH, Urine 6.0 5.0 - 8.0    Leukocytes Negative Negative    Nitrite, UA Negative Negative    Protein, POC Negative Negative mg/dL    Glucose, UA Negative Negative, 1000 mg/dL (3+) mg/dL    Ketones, UA Negative Negative    Urobilinogen, UA Normal Normal    Bilirubin Negative Negative    Blood, UA Trace (A) Negative           Bird was seen today for follow-up and hypertension.    Diagnoses and all orders for this visit:    Essential hypertension  -     POCT urinalysis dipstick, automated    Mixed hyperlipidemia    Thoracic aortic aneurysm, without rupture    CKD (chronic kidney disease) stage 3, GFR 30-59 ml/min (CMS/[facility])    Insomnia, unspecified type    Hematuria, unspecified type  -     Urine Culture - Urine, Urine, Clean Catch    Other orders  -     Cancel: Pain Management Profile (13 Drugs) Urine - Urine, Clean Catch      HTN is under good control and patient will continue to monitor with home BP cuff. No side effects from medications. Will continue current regimen of Metoprolol. Dr. Squires would like her SBP to be in the 105-120 mmHg it appears due to thoracic dilatation. Feeling much better off of HCZT with no dry mouth,  increased urination and dizziness. Will follow up in 6 months.     Patient's cholesterol is under good control. Will continue current regimen of Lovastatin. No side effects from medications reported. Will follow up in 6 months to monitor levels.     TSH has been stable and patient is doing well. Will continue current regimen of levothyroxine 125 mcg and follow up levels in 6 months.     Doing well on Elavil for her sleep.     Urine all normal, just trace of blood.     Return in about 6 months (around 12/18/2019) for Recheck, Medicare Wellness.    Theresa Paul MD  06/18/2019

## 2019-06-20 LAB
BACTERIA UR CULT: NORMAL
BACTERIA UR CULT: NORMAL

## 2019-09-23 NOTE — TELEPHONE ENCOUNTER
Pt  Aware           ----- Message from Theresa Paul MD sent at 3/25/2019  4:47 PM EDT -----  Regarding: RE: BP REPORT  Contact: [phone]  Those are great. Stay of her diuretic and call if they get too high or has issues being off medication.     ----- Message -----  From: Tish Turner MA  Sent: 3/25/2019   4:26 PM  To: Theresa Paul MD  Subject: FW: BP REPORT                                        ----- Message -----  From: Daniel Harvey  Sent: 3/25/2019   3:56 PM  To: Eduardo [address]  Subject: BP REPORT                                        [facility] PT    Patient called to say that dr paul wanted her to call in her bp readings    03/19 -- 131/73     Pulse -- 55  03/21 -- 128/70                   64  03/24 -- 134/72                   62  03/25 -- 132/78                   74    She said that she is feeling ok.    Thanks!  daniel          65 y/o female presents for PST. As per patient she developed cyst to left foot about one year ago and was having cyst drained which was not effective. Patient c/o discomfort with footwear.

## 2019-11-25 ENCOUNTER — OFFICE VISIT (OUTPATIENT)
Dept: INTERNAL MEDICINE | Facility: CLINIC | Age: 74
End: 2019-11-25

## 2019-11-25 VITALS
BODY MASS INDEX: 30.84 KG/M2 | OXYGEN SATURATION: 98 % | HEIGHT: 59 IN | RESPIRATION RATE: 18 BRPM | SYSTOLIC BLOOD PRESSURE: 132 MMHG | DIASTOLIC BLOOD PRESSURE: 94 MMHG | TEMPERATURE: 98.1 F | WEIGHT: 153 LBS | HEART RATE: 55 BPM

## 2019-11-25 DIAGNOSIS — E78.2 MIXED HYPERLIPIDEMIA: ICD-10-CM

## 2019-11-25 DIAGNOSIS — F32.9 REACTIVE DEPRESSION: ICD-10-CM

## 2019-11-25 DIAGNOSIS — I10 ESSENTIAL HYPERTENSION: Primary | ICD-10-CM

## 2019-11-25 DIAGNOSIS — I71.20 THORACIC AORTIC ANEURYSM WITHOUT RUPTURE (HCC): ICD-10-CM

## 2019-11-25 DIAGNOSIS — E03.9 ACQUIRED HYPOTHYROIDISM: ICD-10-CM

## 2019-11-25 PROBLEM — R31.9 HEMATURIA: Status: RESOLVED | Noted: 2017-08-23 | Resolved: 2019-11-25

## 2019-11-25 PROBLEM — E24.2 DRUG-INDUCED CUSHING'S SYNDROME: Status: RESOLVED | Noted: 2018-06-14 | Resolved: 2019-11-25

## 2019-11-25 PROCEDURE — 99214 OFFICE O/P EST MOD 30 MIN: CPT | Performed by: NURSE PRACTITIONER

## 2019-11-25 RX ORDER — DULOXETIN HYDROCHLORIDE 60 MG/1
60 CAPSULE, DELAYED RELEASE ORAL DAILY
Qty: 90 CAPSULE | Refills: 3 | Status: SHIPPED | OUTPATIENT
Start: 2019-11-25 | End: 2020-08-05 | Stop reason: SDUPTHER

## 2019-11-25 RX ORDER — LEVOTHYROXINE SODIUM 0.12 MG/1
125 TABLET ORAL DAILY
Qty: 90 TABLET | Refills: 3 | Status: SHIPPED | OUTPATIENT
Start: 2019-11-25 | End: 2020-08-05 | Stop reason: SDUPTHER

## 2019-11-25 RX ORDER — AMITRIPTYLINE HYDROCHLORIDE 25 MG/1
25 TABLET, FILM COATED ORAL EVERY EVENING
Qty: 90 TABLET | Refills: 3 | Status: SHIPPED | OUTPATIENT
Start: 2019-11-25 | End: 2020-08-05 | Stop reason: SDUPTHER

## 2019-11-25 RX ORDER — LOVASTATIN 20 MG/1
20 TABLET ORAL NIGHTLY
Qty: 90 TABLET | Refills: 3
Start: 2019-11-25 | End: 2020-08-05 | Stop reason: SDUPTHER

## 2019-11-25 RX ORDER — HYDROCHLOROTHIAZIDE 25 MG/1
25 TABLET ORAL DAILY
Qty: 90 TABLET | Refills: 0 | Status: SHIPPED | OUTPATIENT
Start: 2019-11-25 | End: 2019-12-06 | Stop reason: SDUPTHER

## 2019-11-25 RX ORDER — METOPROLOL TARTRATE 75 MG/1
75 TABLET, FILM COATED ORAL 2 TIMES DAILY
Qty: 180 TABLET | Refills: 0 | Status: SHIPPED | OUTPATIENT
Start: 2019-11-25 | End: 2019-12-06

## 2019-11-25 NOTE — PROGRESS NOTES
Subjective    Bird Spear is a 74 y.o. female presenting today for   Chief Complaint   Patient presents with   • essential hyprtension     b/p has been up    • Depression     needs refill for duloxetine        History of Present Illness     Bird Spear presents today as a new patient to me to establish care.   Prior PCP was Dr. Theresa Paul and prior to the Dr. Costa [place], and her current/chronic health conditions include:    Patient Active Problem List   Diagnosis   • Chronic pain syndrome   • Arthritis   • Essential hypertension   • Hypothyroidism   • Hyperlipidemia   • Reflux esophagitis   • Abnormal electrocardiogram   • Degenerative spondylolisthesis   • Spinal stenosis of lumbar region   • Vaginal atrophy   • Thoracic aortic aneurysm, without rupture   • Greater trochanteric bursitis of both hips   • Tendinopathy of rotator cuff, right   • Acromioclavicular joint arthritis   • CKD (chronic kidney disease) stage 3, GFR 30-59 ml/min (CMS/[facility])   • Hiatal hernia   • Coronary artery disease involving native coronary artery of native heart without angina pectoris   • Reactive depression     Mrs. Spear presents today with concerns about her blood pressure. She has a long h/o HTN. She is currently taking Metoprolol 62.5mg twice daily. She was previously taking 25mg daily until her Cardiologist, Dr. Squires, advised her to increase to 37.5mg twice daily 6mo ago. Her home readings have continued to be high 140-150s/70-90s. In consideration of her thoracic aortic aneurysm, Dr. Squires has advised to keep her BP under 120/78. She had previously also been on HCTZ but this was d/c'ed by Dr. Paul. Mrs. Spear restarted this at 12.5mg about 2 weeks ago. She was d/t see Dr. Squires about 2 weeks ago but this visit was cancelled by her office ad has not yet been rescheduled.    She also takes Duloxetine for reactive depression following a prior back surgery. This is currently well-controlled. She takes amitriptyline at bedtime  "for \"cold chills\" and feelings of \"things crawling on my skin.\"    Mrs. Spear also has a history of hypothyroidism and takes 125mcg of levothyroxine. She is katelynn this well and has no complaints r/t her thyroid.    She see Pain Management for chronic pain and narcotic therapy.      The following portions of the patient's history were reviewed and updated as appropriate: allergies, current medications, past family history, past medical history, past social history, past surgical history and problem list.    Review of Systems   Constitutional: Positive for fatigue.   Respiratory: Negative for shortness of breath.    Cardiovascular: Negative for chest pain and palpitations.   Musculoskeletal: Positive for arthralgias.   Neurological: Negative for dizziness and headache.   Psychiatric/Behavioral: Negative for suicidal ideas and depressed mood. The patient is not nervous/anxious.    All other systems reviewed and are negative.      Objective    Vitals:    11/25/19 0943   BP: 132/94   BP Location: Left arm   Patient Position: Sitting   Cuff Size: Adult   Pulse: 55   Resp: 18   Temp: 98.1 °F (36.7 °C)   TempSrc: Oral   SpO2: 98%   Weight: 69.4 kg (153 lb)   Height: 149.9 cm (59.02\")     Body mass index is 30.89 kg/m².  Nursing notes and vitals reviewed.    Physical Exam   Constitutional: She is oriented to person, place, and time. She appears well-developed and well-nourished. No distress.   HENT:   Right Ear: Hearing, tympanic membrane, external ear and ear canal normal.   Left Ear: Hearing, tympanic membrane, external ear and ear canal normal.   Nose: Nose normal.   Mouth/Throat: Uvula is midline, oropharynx is clear and moist and mucous membranes are normal.   Neck: Neck supple. No thyroid mass and no thyromegaly present.   Cardiovascular: Regular rhythm, S1 normal, S2 normal and normal pulses. Exam reveals no gallop and no friction rub.   No murmur heard.  Pulmonary/Chest: Effort normal and breath sounds normal. She has " no wheezes. She has no rhonchi. She has no rales.   Lymphadenopathy:     She has no cervical adenopathy.   Neurological: She is alert and oriented to person, place, and time. No cranial nerve deficit or sensory deficit.   Psychiatric: She has a normal mood and affect. Her speech is normal and behavior is normal. She is attentive.       Assessment and Plan    Bird was seen today for essential hyprtension and depression.    Diagnoses and all orders for this visit:    Essential hypertension  -     hydroCHLOROthiazide (HYDRODIURIL) 25 MG tablet; Take 1 tablet by mouth Daily.  -     Metoprolol Tartrate 75 MG tablet; Take 75 mg by mouth 2 (Two) Times a Day.    Thoracic aortic aneurysm, without rupture    Mixed hyperlipidemia  -     lovastatin (MEVACOR) 20 MG tablet; Take 1 tablet by mouth Every Night.    Acquired hypothyroidism  -     levothyroxine (SYNTHROID, LEVOTHROID) 125 MCG tablet; Take 1 tablet by mouth Daily.    Reactive depression  -     amitriptyline (ELAVIL) 25 MG tablet; Take 1 tablet by mouth Every Evening.  -     DULoxetine (CYMBALTA) 60 MG capsule; Take 1 capsule by mouth Daily.      Return in about 6 weeks (around 1/6/2020).

## 2019-11-30 ENCOUNTER — RESULTS ENCOUNTER (OUTPATIENT)
Dept: INTERNAL MEDICINE | Facility: CLINIC | Age: 74
End: 2019-11-30

## 2019-11-30 DIAGNOSIS — E03.9 ACQUIRED HYPOTHYROIDISM: ICD-10-CM

## 2019-11-30 DIAGNOSIS — I10 ESSENTIAL HYPERTENSION: ICD-10-CM

## 2019-11-30 DIAGNOSIS — E78.2 MIXED HYPERLIPIDEMIA: ICD-10-CM

## 2019-12-06 ENCOUNTER — TELEPHONE (OUTPATIENT)
Dept: CARDIOLOGY | Facility: CLINIC | Age: 74
End: 2019-12-06

## 2019-12-06 ENCOUNTER — OFFICE VISIT (OUTPATIENT)
Dept: CARDIOLOGY | Facility: CLINIC | Age: 74
End: 2019-12-06

## 2019-12-06 VITALS
HEART RATE: 56 BPM | BODY MASS INDEX: 30.24 KG/M2 | DIASTOLIC BLOOD PRESSURE: 70 MMHG | SYSTOLIC BLOOD PRESSURE: 118 MMHG | HEIGHT: 59 IN | WEIGHT: 150 LBS

## 2019-12-06 DIAGNOSIS — I10 ESSENTIAL HYPERTENSION: ICD-10-CM

## 2019-12-06 DIAGNOSIS — I25.10 CORONARY ARTERY DISEASE INVOLVING NATIVE CORONARY ARTERY OF NATIVE HEART WITHOUT ANGINA PECTORIS: Primary | ICD-10-CM

## 2019-12-06 DIAGNOSIS — I71.20 THORACIC AORTIC ANEURYSM WITHOUT RUPTURE (HCC): ICD-10-CM

## 2019-12-06 PROCEDURE — 99214 OFFICE O/P EST MOD 30 MIN: CPT | Performed by: INTERNAL MEDICINE

## 2019-12-06 PROCEDURE — 93000 ELECTROCARDIOGRAM COMPLETE: CPT | Performed by: INTERNAL MEDICINE

## 2019-12-06 RX ORDER — METOPROLOL TARTRATE 50 MG/1
75 TABLET, FILM COATED ORAL 2 TIMES DAILY
Qty: 270 TABLET | Refills: 3 | Status: SHIPPED | OUTPATIENT
Start: 2019-12-06 | End: 2020-08-05 | Stop reason: SDUPTHER

## 2019-12-06 RX ORDER — HYDROCHLOROTHIAZIDE 25 MG/1
12.5 TABLET ORAL DAILY
Qty: 90 TABLET | Refills: 0 | Status: SHIPPED | OUTPATIENT
Start: 2019-12-06 | End: 2019-12-06

## 2019-12-06 RX ORDER — AMLODIPINE BESYLATE 2.5 MG/1
2.5 TABLET ORAL DAILY
Qty: 30 TABLET | Refills: 6 | Status: SHIPPED | OUTPATIENT
Start: 2019-12-06 | End: 2019-12-12 | Stop reason: SDUPTHER

## 2019-12-06 NOTE — PROGRESS NOTES
Date of Office Visit: 2019  Encounter Provider: Giulia Squires MD  Place of Service: [facility] CARDIOLOGY  Patient Name: Bird Spear  :1945    Chief complaint  Followup of thoracic aortic aneurysm, mild coronary artery disease and palpitations    History of Present Illness  Patient is a pleasant 73-year-old female with history of hypertension, hyperlipidemia, renal insufficiency, hypothyroidism.  In  she was found to have a mildly dilated aorta.  This is been followed with serial imaging studies.  She had an echocardiogram in 2016 that showed normal systolic function no significant valvular disease.  She had a nuclear stress test in 2014 that was negative for ischemia.  She had a noncontrast CT of her chest in 2015 that showed a slight increase in the ascending thoracic aortic size forms 3.7-3.9 cm. CT chest without contrast in 2017 revealed mild calcified plaque in the LAD with ectasia of the ascending measuring 3.9 cm is unchanged.  There was also moderate to large sized hiatal hernia breast changes were noted suspicious for her surgery and postop changes.  Hepatic cyst was noted.  Follow-up CT scan of the chest without contrast in 2018 showed a stable asending aorta dilated to 3.9 cm.     Since the last visit she has had no chest pain, palpitations, syncope near syncope.  She has had some dyspnea.  She states that her blood pressures been quite well until October when all of a sudden while walking she developed pounding in her chest and neck and dizziness.  On her arrival home her blood pressure was quite high in the 170s.  It has remained mostly high since then.  More recently around  she started taking extra doses of hydrochlorothiazide and a week ago hydrochlorothiazide was increased 25 mg daily metoprolol was increased to 75 mg twice daily by DARINEL Palma.  Earlier this week however she developed right sided abdominal  pain was seen in the emergency room at [place].  A CT scan of her abdomen was apparently abnormal.  Blood work there showed a creatinine of 1.1 with a GFR 49, potassium 3.5.  White count was normal.  She did get contrast CT abdomen and with this she developed diarrhea with some improvement in her abdominal pain.  The CT was apparently unremarkable.  She has had colonic polyps which a year ago was addressed by Dr. Brooks.  She currently does not have any melena.  She has had no chest pain back pain.  Her blood pressure on her current regimen still remains elevated in the mornings up in the 140s over 50s 2 hours later it improves to 120s and in the evening it is again elevated.    Past Medical History:   Diagnosis Date   • Abnormal electrocardiogram    • Arthritis 2016   • Arthritis of neck    • Breast cancer, right (CMS/HCC) 2016    arimidex one year Tamoxifen one year   • Chronic pain syndrome 2016   • CKD (chronic kidney disease) stage 3, GFR 30-59 ml/min (CMS/HCC)    • Colon polyp    • Depression    • Drug-induced Cushing's syndrome (CMS/HCC) 2018   • Edema    • Essential hypertension 2016   • Hematuria 2017   • Hx of radiation therapy    • Hyperlipidemia 2016   • Hypothyroidism (acquired) 2016   • Lumbosacral disc disease    • Renal failure    • Scoliosis    • Thoracic aortic aneurysm (CMS/HCC)    • Thyroid disease      Past Surgical History:   Procedure Laterality Date   • BREAST LUMPECTOMY Right 2014   •  SECTION     • EXTRACORPOREAL SHOCK WAVE LITHOTRIPSY (ESWL)     • HYSTERECTOMY     • LUMBAR FUSION  2012    L3,4,5     Outpatient Medications Prior to Visit   Medication Sig Dispense Refill   • amitriptyline (ELAVIL) 25 MG tablet Take 1 tablet by mouth Every Evening. 90 tablet 3   • aspirin 325 MG EC tablet Take 325 mg by mouth Daily.     • Calcium Carbonate (CALTRATE 600) 1500 (600 CA) MG tablet Take  by mouth.     • cetirizine  (zyrTEC) 10 MG tablet Take 10 mg by mouth As Needed for Allergies.     • Coenzyme Q10 (COQ10) 200 MG capsule Take 1 capsule by mouth Daily.     • DULoxetine (CYMBALTA) 60 MG capsule Take 1 capsule by mouth Daily. 90 capsule 3   • HYDROcodone-acetaminophen (NORCO) 7.5-325 MG per tablet Take 1 tablet by mouth Every 6 (Six) Hours As Needed.     • levothyroxine (SYNTHROID, LEVOTHROID) 125 MCG tablet Take 1 tablet by mouth Daily. 90 tablet 3   • lovastatin (MEVACOR) 20 MG tablet Take 1 tablet by mouth Every Night. 90 tablet 3   • Multiple Vitamin (MULTI VITAMIN) tablet Take  by mouth.     • omeprazole (PriLOSEC) 20 MG capsule Take 1 capsule by mouth as needed (reflux). 90 capsule 2   • hydroCHLOROthiazide (HYDRODIURIL) 25 MG tablet Take 1 tablet by mouth Daily. 90 tablet 0   • Metoprolol Tartrate 75 MG tablet Take 75 mg by mouth 2 (Two) Times a Day. 180 tablet 0     No facility-administered medications prior to visit.        Allergies as of 12/06/2019   • (No Known Allergies)     Social History     Socioeconomic History   • Marital status:      Spouse name: Not on file   • Number of children: Not on file   • Years of education: Not on file   • Highest education level: Not on file   Tobacco Use   • Smoking status: Never Smoker   • Smokeless tobacco: Never Used   Substance and Sexual Activity   • Alcohol use: No     Comment: caffeine use   • Drug use: No   • Sexual activity: Not Currently     Family History   Problem Relation Age of Onset   • Heart disease Mother    • Hyperlipidemia Mother    • Diabetes Mother    • Hypertension Mother    • Heart disease Father         Coronary artery disease   • Hyperlipidemia Father    • Hypertension Father    • Heart disease Sister    • Hypertension Other      Review of Systems   Constitution: Negative for fever, malaise/fatigue, weight gain and weight loss.   HENT: Negative for ear pain, hearing loss, nosebleeds and sore throat.    Eyes: Negative for double vision, pain, vision  "loss in left eye and vision loss in right eye.   Cardiovascular:        See history of present illness.   Respiratory: Positive for shortness of breath. Negative for cough, sleep disturbances due to breathing, snoring and wheezing.    Endocrine: Negative for cold intolerance, heat intolerance and polyuria.   Skin: Negative for itching, poor wound healing and rash.   Musculoskeletal: Positive for joint pain. Negative for joint swelling and myalgias.   Gastrointestinal: Positive for abdominal pain. Negative for diarrhea, hematochezia, nausea and vomiting.   Genitourinary: Negative for hematuria and hesitancy.   Neurological: Negative for numbness, paresthesias and seizures.   Psychiatric/Behavioral: Negative for depression. The patient is not nervous/anxious.         Objective:     Vitals:    12/06/19 1028   BP: 118/70   Pulse: 56   Weight: 68 kg (150 lb)   Height: 149.9 cm (59\")     Body mass index is 30.3 kg/m².    Physical Exam   Constitutional: She is oriented to person, place, and time. She appears well-developed and well-nourished.   Obese   HENT:   Head: Normocephalic.   Nose: Nose normal.   Mouth/Throat: Oropharynx is clear and moist.   Eyes: Conjunctivae and EOM are normal. Pupils are equal, round, and reactive to light. Right eye exhibits no discharge. No scleral icterus.   Neck: Normal range of motion. Neck supple. No JVD present. No thyromegaly present.   Cardiovascular: Normal rate, regular rhythm, normal heart sounds and intact distal pulses. Exam reveals no gallop and no friction rub.   No murmur heard.  Pulses:       Carotid pulses are 2+ on the right side, and 2+ on the left side.       Radial pulses are 2+ on the right side, and 2+ on the left side.        Femoral pulses are 2+ on the right side, and 2+ on the left side.       Popliteal pulses are 2+ on the right side, and 2+ on the left side.        Dorsalis pedis pulses are 2+ on the right side, and 2+ on the left side.        Posterior tibial " pulses are 2+ on the right side, and 2+ on the left side.   Pulmonary/Chest: Effort normal and breath sounds normal. No respiratory distress. She has no wheezes. She has no rales.   Abdominal: Soft. Bowel sounds are normal. She exhibits no distension. There is no hepatosplenomegaly. There is no tenderness. There is no rebound.   Musculoskeletal: Normal range of motion. She exhibits no edema or tenderness.   Neurological: She is alert and oriented to person, place, and time.   Skin: Skin is warm and dry. No rash noted. No erythema.   Psychiatric: She has a normal mood and affect. Her behavior is normal. Judgment and thought content normal.   Vitals reviewed.    Lab Review:     ECG 12 Lead  Date/Time: 12/6/2019 10:31 AM  Performed by: Giulia Squires MD  Authorized by: Giulia Squires MD   Comparison: compared with previous ECG   Similar to previous ECG  Rhythm: sinus rhythm  Other findings: non-specific ST-T wave changes    Clinical impression: abnormal EKG          Assessment:       Diagnosis Plan   1. Coronary artery disease involving native coronary artery of native heart without angina pectoris  ECG 12 Lead   2. Thoracic aortic aneurysm, without rupture     3. Essential hypertension  ECG 12 Lead    Basic Metabolic Panel     Plan:       1.  Hypertension.  Given renal insufficiency will discontinue hydrochlorothiazide and add amlodipine 2.5 mg to her regimen.  We will continue with metoprolol 75 mg twice daily  2.   Asending aortic aneurysm.  We will check a CT angiogram of the chest with contrast if her renal function improves otherwise we will check it without contrast once her blood pressure and renal function stabilized in the next week to 2 weeks  3.  Renal insufficiency.  As above.  Check a BMP in 1 week  4.  Coronary artery disease noted by CT imaging negative stress test 2014.  No anginal symptoms at this point.  5.  Breast cancer.  Had radiation to right breast  6.  Abdominal pain.  I recommended she follow-up  with Dr. Brooks and she agrees to call to arrange a follow-up.  7.  Probable hepatic cyst.  8.  Dyslipidemia.       Your medication list           Accurate as of 12/6/19 11:57 AM. If you have any questions, ask your nurse or doctor.               START taking these medications      Instructions Last Dose Given Next Dose Due   amLODIPine 2.5 MG tablet  Commonly known as:  NORVASC  Started by:  Giulia Squires MD      Take 1 tablet by mouth Daily.          CHANGE how you take these medications      Instructions Last Dose Given Next Dose Due   metoprolol tartrate 50 MG tablet  Commonly known as:  LOPRESSOR  What changed:  medication strength  Changed by:  Giulia Squires MD      Take 1.5 tablets by mouth 2 (Two) Times a Day.          CONTINUE taking these medications      Instructions Last Dose Given Next Dose Due   amitriptyline 25 MG tablet  Commonly known as:  ELAVIL      Take 1 tablet by mouth Every Evening.       aspirin 325 MG EC tablet      Take 325 mg by mouth Daily.       CALTRATE 600 1500 (600 Ca) MG tablet  Generic drug:  Calcium Carbonate      Take  by mouth.       cetirizine 10 MG tablet  Commonly known as:  zyrTEC      Take 10 mg by mouth As Needed for Allergies.       CoQ10 200 MG capsule      Take 1 capsule by mouth Daily.       DULoxetine 60 MG capsule  Commonly known as:  CYMBALTA      Take 1 capsule by mouth Daily.       HYDROcodone-acetaminophen 7.5-325 MG per tablet  Commonly known as:  NORCO      Take 1 tablet by mouth Every 6 (Six) Hours As Needed.       levothyroxine 125 MCG tablet  Commonly known as:  SYNTHROID, LEVOTHROID      Take 1 tablet by mouth Daily.       lovastatin 20 MG tablet  Commonly known as:  MEVACOR      Take 1 tablet by mouth Every Night.       Multi Vitamin tablet      Take  by mouth.       omeprazole 20 MG capsule  Commonly known as:  priLOSEC      Take 1 capsule by mouth as needed (reflux).          STOP taking these medications    hydroCHLOROthiazide 25 MG tablet  Commonly  known as:  HYDRODIURIL  Stopped by:  Giulia Squires MD              Where to Get Your Medications      These medications were sent to [facility] for [place], [place] - [address] [facility] - [phone]  - [phone]  [address] [place] [address]    Phone:  [phone]   · metoprolol tartrate 50 MG tablet     These medications were sent to [facility] - [place], [place] - [address] [place] [address] [phone]  - [phone]   [address] [facility] [address]    Phone:  [phone]   · amLODIPine 2.5 MG tablet       Dictated utilizing Dragon dictation

## 2019-12-10 NOTE — TELEPHONE ENCOUNTER
"I called patient after reviewing the CT scan.  There appeared to be \"probable cortical infarct\" to the kidneys.  She believes there was some abnormality in the past when she had a bad infection and has some records which she will try to locate in regards to this.  I asked her to also discuss this with Dr. Paul to determine if this is new or old.  If it is new she will need further evaluation for embolic or in situ thrombosis.  I also asked her to contact Dr. Brooks regarding the distended gallbladder.  She has not done so as of yet.    Lynn, please call Dr. Paul's office and alert them that her CT scan of the abdomen was abnormal and we have asked the patient to call Dr. Paul for additional recommendations.roge  "

## 2019-12-11 ENCOUNTER — LAB (OUTPATIENT)
Dept: LAB | Facility: HOSPITAL | Age: 74
End: 2019-12-11

## 2019-12-11 DIAGNOSIS — I10 ESSENTIAL HYPERTENSION: ICD-10-CM

## 2019-12-11 LAB
ANION GAP SERPL CALCULATED.3IONS-SCNC: 11.7 MMOL/L (ref 5–15)
BUN BLD-MCNC: 16 MG/DL (ref 8–23)
BUN/CREAT SERPL: 16.7 (ref 7–25)
CALCIUM SPEC-SCNC: 9.6 MG/DL (ref 8.6–10.5)
CHLORIDE SERPL-SCNC: 103 MMOL/L (ref 98–107)
CO2 SERPL-SCNC: 25.3 MMOL/L (ref 22–29)
CREAT BLD-MCNC: 0.96 MG/DL (ref 0.57–1)
GFR SERPL CREATININE-BSD FRML MDRD: 57 ML/MIN/1.73
GLUCOSE BLD-MCNC: 102 MG/DL (ref 65–99)
POTASSIUM BLD-SCNC: 3.9 MMOL/L (ref 3.5–5.2)
SODIUM BLD-SCNC: 140 MMOL/L (ref 136–145)

## 2019-12-11 PROCEDURE — 36415 COLL VENOUS BLD VENIPUNCTURE: CPT

## 2019-12-11 PROCEDURE — 80048 BASIC METABOLIC PNL TOTAL CA: CPT

## 2019-12-11 NOTE — TELEPHONE ENCOUNTER
If this is felt to be new after the visit with Dr. Paul, will need an echocardiogram, an event monitor and vascular consultation.  Please follow-up on this. negro

## 2019-12-12 ENCOUNTER — OFFICE VISIT (OUTPATIENT)
Dept: INTERNAL MEDICINE | Facility: CLINIC | Age: 74
End: 2019-12-12

## 2019-12-12 VITALS
BODY MASS INDEX: 30.44 KG/M2 | TEMPERATURE: 98.3 F | OXYGEN SATURATION: 98 % | HEART RATE: 59 BPM | HEIGHT: 59 IN | SYSTOLIC BLOOD PRESSURE: 148 MMHG | WEIGHT: 151 LBS | RESPIRATION RATE: 14 BRPM | DIASTOLIC BLOOD PRESSURE: 82 MMHG

## 2019-12-12 DIAGNOSIS — I71.20 THORACIC AORTIC ANEURYSM WITHOUT RUPTURE (HCC): ICD-10-CM

## 2019-12-12 DIAGNOSIS — G89.4 CHRONIC PAIN SYNDROME: ICD-10-CM

## 2019-12-12 DIAGNOSIS — K59.03 DRUG-INDUCED CONSTIPATION: ICD-10-CM

## 2019-12-12 DIAGNOSIS — M48.061 SPINAL STENOSIS OF LUMBAR REGION, UNSPECIFIED WHETHER NEUROGENIC CLAUDICATION PRESENT: Primary | ICD-10-CM

## 2019-12-12 DIAGNOSIS — N18.30 CKD (CHRONIC KIDNEY DISEASE) STAGE 3, GFR 30-59 ML/MIN (HCC): ICD-10-CM

## 2019-12-12 PROCEDURE — 99214 OFFICE O/P EST MOD 30 MIN: CPT | Performed by: INTERNAL MEDICINE

## 2019-12-12 RX ORDER — AMLODIPINE BESYLATE 5 MG/1
5 TABLET ORAL DAILY
Qty: 90 TABLET | Refills: 1 | Status: SHIPPED | OUTPATIENT
Start: 2019-12-12 | End: 2020-02-19

## 2019-12-12 RX ORDER — DICYCLOMINE HCL 20 MG
TABLET ORAL
Refills: 0 | COMMUNITY
Start: 2019-12-03 | End: 2020-06-15

## 2019-12-12 RX ORDER — POLYETHYLENE GLYCOL 3350 17 G/17G
8.5 POWDER, FOR SOLUTION ORAL AS NEEDED
COMMUNITY

## 2019-12-12 RX ORDER — AMLODIPINE BESYLATE 2.5 MG/1
5 TABLET ORAL DAILY
Qty: 30 TABLET | Refills: 3 | Status: SHIPPED | OUTPATIENT
Start: 2019-12-12 | End: 2019-12-12 | Stop reason: SDUPTHER

## 2019-12-12 NOTE — PROGRESS NOTES
Bird Spear is a 74 y.o. female, who presents with a chief complaint of   Chief Complaint   Patient presents with   • Follow-up   • Abdominal Pain     slightly enlarged gallbladder per ER MD at [facility] in [place]        73 yo F with spinal stenosis and chronic pain and thoracic aneurysms here for follow up from ER. She was seen at [facility] ER in [place] on 12/3 for RLQ pain. Had this four days prior to ER visit. No n/v/d. No fever or sick contacts. She had blood work that I reviewed and was all normal other than GFR that was 48 down from 57-60 which is her normal. I did not get to review u/s or CT scan that was all normal per her. They gave her Bentyl that helped some. BM's have been normal. They have been a little harder. She takes about 1/2 Norco TID- QID. Her stools have been harder lately. Dr. Squires did adjust her BP medications. She took her off HCTZ and gave her Norvasc instead. Tolerating this well. She uses Citracell every day and eats fruits. BP has been labile. Running as low as 108-70 to 145/90.        The following portions of the patient's history were reviewed and updated as appropriate: allergies, current medications, past family history, past medical history, past social history, past surgical history and problem list.    Allergies: Patient has no known allergies.    Current Outpatient Medications:   •  amitriptyline (ELAVIL) 25 MG tablet, Take 1 tablet by mouth Every Evening., Disp: 90 tablet, Rfl: 3  •  amLODIPine (NORVASC) 5 MG tablet, Take 1 tablet by mouth Daily., Disp: 90 tablet, Rfl: 1  •  aspirin 325 MG EC tablet, Take 325 mg by mouth Daily., Disp: , Rfl:   •  Calcium Carbonate (CALTRATE 600) 1500 (600 CA) MG tablet, Take  by mouth., Disp: , Rfl:   •  cetirizine (zyrTEC) 10 MG tablet, Take 10 mg by mouth As Needed for Allergies., Disp: , Rfl:   •  Coenzyme Q10 (COQ10) 200 MG capsule, Take 1 capsule by mouth Daily., Disp: , Rfl:   •  dicyclomine (BENTYL) 20 MG tablet, TAKE 1  "TABLET BY MOUTH 4 TIMES DAILY FOR 7 DAYS, Disp: , Rfl: 0  •  DULoxetine (CYMBALTA) 60 MG capsule, Take 1 capsule by mouth Daily., Disp: 90 capsule, Rfl: 3  •  HYDROcodone-acetaminophen (NORCO) 7.5-325 MG per tablet, Take 1 tablet by mouth Every 6 (Six) Hours As Needed., Disp: , Rfl:   •  levothyroxine (SYNTHROID, LEVOTHROID) 125 MCG tablet, Take 1 tablet by mouth Daily., Disp: 90 tablet, Rfl: 3  •  lovastatin (MEVACOR) 20 MG tablet, Take 1 tablet by mouth Every Night., Disp: 90 tablet, Rfl: 3  •  metoprolol tartrate (LOPRESSOR) 50 MG tablet, Take 1.5 tablets by mouth 2 (Two) Times a Day., Disp: 270 tablet, Rfl: 3  •  Multiple Vitamin (MULTI VITAMIN) tablet, Take  by mouth., Disp: , Rfl:   •  omeprazole (PriLOSEC) 20 MG capsule, Take 1 capsule by mouth as needed (reflux)., Disp: 90 capsule, Rfl: 2  •  polyethylene glycol (MIRALAX) packet, Take 8.5 g by mouth Daily., Disp: , Rfl:   Medications Discontinued During This Encounter   Medication Reason   • amLODIPine (NORVASC) 2.5 MG tablet Reorder   • amLODIPine (NORVASC) 2.5 MG tablet Reorder       Review of Systems   Constitutional: Negative for chills, fatigue and fever.   Respiratory: Negative for cough and shortness of breath.    Cardiovascular: Negative for chest pain and palpitations.   Gastrointestinal: Positive for constipation. Negative for diarrhea, nausea and vomiting.   Musculoskeletal: Negative for arthralgias.   Skin: Negative for rash.             /82 (BP Location: Left arm, Patient Position: Sitting, Cuff Size: Large Adult)   Pulse 59   Temp 98.3 °F (36.8 °C) (Oral)   Resp 14   Ht 149.9 cm (59\")   Wt 68.5 kg (151 lb)   LMP  (LMP Unknown)   SpO2 98%   BMI 30.50 kg/m²       Physical Exam   Constitutional: She is oriented to person, place, and time. She appears well-developed and well-nourished. No distress.   HENT:   Head: Normocephalic and atraumatic.   Right Ear: External ear normal.   Left Ear: External ear normal.   Mouth/Throat: " Oropharynx is clear and moist. No oropharyngeal exudate.   Eyes: Conjunctivae are normal. Right eye exhibits no discharge. Left eye exhibits no discharge. No scleral icterus.   Neck: Neck supple.   Cardiovascular: Normal rate, regular rhythm and normal heart sounds. Exam reveals no gallop and no friction rub.   No murmur heard.  Pulmonary/Chest: Effort normal and breath sounds normal. No respiratory distress. She has no wheezes. She has no rales.   Abdominal: Soft. Bowel sounds are normal. She exhibits no distension and no mass. There is no tenderness. There is no guarding.   Lymphadenopathy:     She has no cervical adenopathy.   Neurological: She is alert and oriented to person, place, and time.   Skin: Skin is warm. No rash noted.   Psychiatric: She has a normal mood and affect. Her behavior is normal.   Nursing note and vitals reviewed.          Results for orders placed or performed in visit on 12/11/19   Basic Metabolic Panel   Result Value Ref Range    Glucose 102 (H) 65 - 99 mg/dL    BUN 16 8 - 23 mg/dL    Creatinine 0.96 0.57 - 1.00 mg/dL    Sodium 140 136 - 145 mmol/L    Potassium 3.9 3.5 - 5.2 mmol/L    Chloride 103 98 - 107 mmol/L    CO2 25.3 22.0 - 29.0 mmol/L    Calcium 9.6 8.6 - 10.5 mg/dL    eGFR Non African Amer 57 (L) >60 mL/min/1.73    BUN/Creatinine Ratio 16.7 7.0 - 25.0    Anion Gap 11.7 5.0 - 15.0 mmol/L           Bird was seen today for follow-up and abdominal pain.    Diagnoses and all orders for this visit:    Spinal stenosis of lumbar region, unspecified whether neurogenic claudication present    CKD (chronic kidney disease) stage 3, GFR 30-59 ml/min (CMS/[facility])    Chronic pain syndrome    Drug-induced constipation    Thoracic aortic aneurysm, without rupture    Other orders  -     Discontinue: amLODIPine (NORVASC) 2.5 MG tablet; Take 2 tablets by mouth Daily.  -     amLODIPine (NORVASC) 5 MG tablet; Take 1 tablet by mouth Daily.        Goal BP is about 120/80 with her aneurysms. Will go up  to 5mg on her Norvasc. She is going to continue her BB.     I think that her abdominal pain is related to constipation. Continue Citracell as well as Miralax 1/2 capful daily for soft stools with her hydrocodone.     Will see back in one week to check her pressure with Ayaka. Her CKD is better with holding HCTZ (up from 48 in the hospital).     May consider Hydralazine in the future if she cannot tolerate ACE/ARB or HCTZ       Return in about 1 week (around 12/19/2019) for Recheck of HTN with Ayaka .    Theresa Paul MD  12/12/2019

## 2019-12-13 NOTE — TELEPHONE ENCOUNTER
12/13/19  I called the pcp ofc ph and the prompt for medical records gave [facility] record ph of [phone].  I spoke with Mary Jane, who did not see a CT scan for 2007, she did have the US of the kidneys so I asked that she fax that just in case it shows what you need/lyetta

## 2019-12-13 NOTE — TELEPHONE ENCOUNTER
12/13/19  I spoke with patient - she is unsure if the ct report is in her medical records, if not, she will contact that doctor's office and try to get a copy but she says it was from 2007 and the CT was done at [facility] - I don't see a CT scan from 2007 other than a chest CT.  She will follow up with those doctors and call if she can not locate the copy of the CT/jones

## 2019-12-13 NOTE — TELEPHONE ENCOUNTER
12/13/19  [facility] left at 12:55  Patient calling with update.  I called her back - she states Dr. Paul felt this was not new.  In 2007 patient had a kidney stone lodged in the duct and when they tried to remove it, it pushed back into the kidney; stent was placed and she contracted E Coli, which damaged both kidneys, accompanied with a 103* temp.. Dr. Paul also recommended she did not have to contact Dr. Brooks either due to a previous history of the same thing when she was very constipated - cleared up after the constipation was relieved.  She has the records from that time in the hospital if we want them to review - she says she can copy and mail to you.  Her ph [phone]/jones

## 2019-12-13 NOTE — TELEPHONE ENCOUNTER
Please let the office know we appreciate this information.  If they would you mind forwarding me that prior CT would be helpful.  Thank you. roge

## 2020-01-04 DIAGNOSIS — N28.0 RENAL INFARCTION (HCC): Primary | ICD-10-CM

## 2020-01-24 ENCOUNTER — TRANSCRIBE ORDERS (OUTPATIENT)
Dept: ADMINISTRATIVE | Facility: HOSPITAL | Age: 75
End: 2020-01-24

## 2020-01-24 DIAGNOSIS — N28.0 RENAL INFARCTION (HCC): Primary | ICD-10-CM

## 2020-01-29 ENCOUNTER — HOSPITAL ENCOUNTER (OUTPATIENT)
Dept: CT IMAGING | Facility: HOSPITAL | Age: 75
Discharge: HOME | End: 2020-01-29
Admitting: SURGERY

## 2020-01-29 DIAGNOSIS — N28.0 RENAL INFARCTION (HCC): ICD-10-CM

## 2020-01-29 PROCEDURE — 82565 ASSAY OF CREATININE: CPT

## 2020-01-29 PROCEDURE — 74174 CTA ABD&PLVS W/CONTRAST: CPT

## 2020-01-29 PROCEDURE — 25010000002 IOPAMIDOL 61 % SOLUTION: Performed by: SURGERY

## 2020-01-29 RX ADMIN — IOPAMIDOL 95 ML: 612 INJECTION, SOLUTION INTRAVENOUS at 11:29

## 2020-01-30 LAB — CREAT BLDA-MCNC: 1.1 MG/DL (ref 0.6–1.3)

## 2020-02-19 ENCOUNTER — OFFICE VISIT (OUTPATIENT)
Dept: FAMILY MEDICINE CLINIC | Facility: CLINIC | Age: 75
End: 2020-02-19

## 2020-02-19 VITALS
HEART RATE: 63 BPM | DIASTOLIC BLOOD PRESSURE: 88 MMHG | BODY MASS INDEX: 30.56 KG/M2 | SYSTOLIC BLOOD PRESSURE: 142 MMHG | WEIGHT: 151.6 LBS | HEIGHT: 59 IN | TEMPERATURE: 97.6 F | OXYGEN SATURATION: 98 %

## 2020-02-19 DIAGNOSIS — N28.9 RENAL INSUFFICIENCY: ICD-10-CM

## 2020-02-19 DIAGNOSIS — N26.1 RENAL ATROPHY, BILATERAL: ICD-10-CM

## 2020-02-19 DIAGNOSIS — R42 DIZZINESS: ICD-10-CM

## 2020-02-19 DIAGNOSIS — F32.9 REACTIVE DEPRESSION: ICD-10-CM

## 2020-02-19 DIAGNOSIS — I71.20 THORACIC AORTIC ANEURYSM WITHOUT RUPTURE (HCC): ICD-10-CM

## 2020-02-19 DIAGNOSIS — I10 ESSENTIAL HYPERTENSION: Primary | ICD-10-CM

## 2020-02-19 DIAGNOSIS — R00.1 BRADYCARDIA: ICD-10-CM

## 2020-02-19 DIAGNOSIS — E03.9 ACQUIRED HYPOTHYROIDISM: ICD-10-CM

## 2020-02-19 PROCEDURE — 99214 OFFICE O/P EST MOD 30 MIN: CPT | Performed by: FAMILY MEDICINE

## 2020-02-19 RX ORDER — AMLODIPINE BESYLATE 2.5 MG/1
2.5 TABLET ORAL DAILY
COMMUNITY
End: 2020-06-02 | Stop reason: SDUPTHER

## 2020-02-19 NOTE — PROGRESS NOTES
Subjective   Bird Spear is a 74 y.o. female.     Chief Complaint   Patient presents with   • Hypertension   • Hyperlipidemia   • Hypothyroidism        Patient presents to [facility].  Her previous physician in [place] is in a  practice.  She is doing well in general.  She has had hypertension for a while but is been concerned about her blood pressure being labile.  She brings in a log of her blood pressures can be as low as the low 100s and as high as the 150s.  She has had some adjustments in medication from cardiology.  At one point the nurse practitioner at her old primary care office had her try 5 mg of the amlodipine but she had some dizzy spells.  When I look at her log she wrote dizzy by an episode where her heart rate was 47.  She has not had any chest pain or palpitations.  She denies ever having a Holter done in the past.  Her last labs in the chart were reviewed.  She states that her cardiologist wanted to do a follow-up scan of her thoracic aneurysm but she had just had a CT with contrast of the abdomen not too long ago.  On that imaging it was discovered that her kidneys were atrophied.  She has been doing a lot of research about this including which over-the-counter medications to avoid and what to be cautious of.  She is compliant with her medications as listed.  She states her depression is under good control with her current medications.         The following portions of the patient's history were reviewed and updated as appropriate: allergies, current medications, past family history, past medical history, past social history, past surgical history and problem list.    Past Medical History:   Diagnosis Date   • Abnormal electrocardiogram    • Arthritis 5/24/2016   • Arthritis of neck    • Breast cancer, right (CMS/[facility]) 5/24/2016    arimidex one year Tamoxifen one year   • Chronic pain syndrome 5/24/2016   • CKD (chronic kidney disease) stage 3, GFR 30-59 ml/min (CMS/[facility])    • Colon  polyp    • Depression    • Drug-induced Cushing's syndrome (CMS/HCC) 2018   • Edema    • Essential hypertension 2016   • Hematuria 2017   • Hx of radiation therapy    • Hyperlipidemia 2016   • Hypothyroidism (acquired) 2016   • Lumbosacral disc disease    • Renal failure    • Scoliosis    • Thoracic aortic aneurysm (CMS/HCC)    • Thyroid disease        Past Surgical History:   Procedure Laterality Date   • BREAST LUMPECTOMY Right 2014   •  SECTION     • EXTRACORPOREAL SHOCK WAVE LITHOTRIPSY (ESWL)     • HYSTERECTOMY     • LUMBAR FUSION  2012    L3,4,5       Family History   Problem Relation Age of Onset   • Heart disease Mother    • Hyperlipidemia Mother    • Diabetes Mother    • Hypertension Mother    • Heart disease Father         Coronary artery disease   • Hyperlipidemia Father    • Hypertension Father    • Heart disease Sister    • Hypertension Other        Social History     Socioeconomic History   • Marital status:      Spouse name: Not on file   • Number of children: Not on file   • Years of education: Not on file   • Highest education level: Not on file   Tobacco Use   • Smoking status: Never Smoker   • Smokeless tobacco: Never Used   Substance and Sexual Activity   • Alcohol use: No     Comment: caffeine use   • Drug use: No   • Sexual activity: Not Currently         Current Outpatient Medications:   •  amitriptyline (ELAVIL) 25 MG tablet, Take 1 tablet by mouth Every Evening., Disp: 90 tablet, Rfl: 3  •  amLODIPine (NORVASC) 2.5 MG tablet, Take 2.5 mg by mouth Daily., Disp: , Rfl:   •  aspirin 325 MG EC tablet, Take 325 mg by mouth Daily., Disp: , Rfl:   •  Calcium Carbonate (CALTRATE 600) 1500 (600 CA) MG tablet, Take  by mouth., Disp: , Rfl:   •  cetirizine (zyrTEC) 10 MG tablet, Take 10 mg by mouth As Needed for Allergies., Disp: , Rfl:   •  Coenzyme Q10 (COQ10) 200 MG capsule, Take 1 capsule by mouth Daily., Disp: , Rfl:   •  dicyclomine  "(BENTYL) 20 MG tablet, TAKE 1 TABLET BY MOUTH 4 TIMES DAILY FOR 7 DAYS, Disp: , Rfl: 0  •  DULoxetine (CYMBALTA) 60 MG capsule, Take 1 capsule by mouth Daily., Disp: 90 capsule, Rfl: 3  •  HYDROcodone-acetaminophen (NORCO) 7.5-325 MG per tablet, Take 1 tablet by mouth Every 6 (Six) Hours As Needed., Disp: , Rfl:   •  levothyroxine (SYNTHROID, LEVOTHROID) 125 MCG tablet, Take 1 tablet by mouth Daily., Disp: 90 tablet, Rfl: 3  •  lovastatin (MEVACOR) 20 MG tablet, Take 1 tablet by mouth Every Night., Disp: 90 tablet, Rfl: 3  •  metoprolol tartrate (LOPRESSOR) 50 MG tablet, Take 1.5 tablets by mouth 2 (Two) Times a Day., Disp: 270 tablet, Rfl: 3  •  Multiple Vitamin (MULTI VITAMIN) tablet, Take  by mouth., Disp: , Rfl:   •  omeprazole (PriLOSEC) 20 MG capsule, Take 1 capsule by mouth as needed (reflux)., Disp: 90 capsule, Rfl: 2  •  polyethylene glycol (MIRALAX) packet, Take 8.5 g by mouth Daily., Disp: , Rfl:     Review of Systems   Constitutional: Negative for chills, fatigue and fever.   HENT: Negative for congestion, rhinorrhea and sore throat.    Respiratory: Negative for cough and shortness of breath.    Cardiovascular: Negative for chest pain and leg swelling.   Gastrointestinal: Negative for abdominal pain.   Endocrine: Negative for polydipsia and polyuria.   Genitourinary: Negative for dysuria.   Musculoskeletal: Positive for arthralgias and back pain. Negative for myalgias.   Skin: Negative for rash.   Neurological: Positive for dizziness.   Hematological: Does not bruise/bleed easily.   Psychiatric/Behavioral: Negative for sleep disturbance.       Objective   Vitals:    02/19/20 1238 02/19/20 1332   BP: 144/82 142/88   Pulse: 63    Temp: 97.6 °F (36.4 °C)    SpO2: 98%    Weight: 68.8 kg (151 lb 9.6 oz)    Height: 149.9 cm (59\")      Body mass index is 30.62 kg/m².  Physical Exam   Constitutional: She is oriented to person, place, and time. She appears well-developed and well-nourished.   HENT:   Head: " Normocephalic and atraumatic.   Eyes: Pupils are equal, round, and reactive to light. Conjunctivae and EOM are normal.   Neck: Neck supple. No thyromegaly present.   Cardiovascular: Normal rate and regular rhythm.   No murmur heard.  Pulmonary/Chest: Effort normal and breath sounds normal. She has no wheezes.   Abdominal: Soft.   Musculoskeletal: Normal range of motion.   Neurological: She is alert and oriented to person, place, and time. No cranial nerve deficit.   Skin: Skin is warm and dry.   Psychiatric: She has a normal mood and affect.         Assessment/Plan   Bird was seen today for hypertension, hyperlipidemia and hypothyroidism.    Diagnoses and all orders for this visit:    Essential hypertension  -     Basic Metabolic Panel    Bradycardia  -     Holter monitor - 24 hour; Future    Thoracic aortic aneurysm, without rupture    Acquired hypothyroidism    Renal atrophy, bilateral  -     Basic Metabolic Panel    Reactive depression    Renal insufficiency  -     Basic Metabolic Panel    Dizziness  -     Holter monitor - 24 hour; Future               Patient Instructions   Follow up for Medicare Wellness and pending lab results.

## 2020-02-20 LAB
BUN SERPL-MCNC: 18 MG/DL (ref 8–27)
BUN/CREAT SERPL: 20 (ref 12–28)
CALCIUM SERPL-MCNC: 10 MG/DL (ref 8.7–10.3)
CHLORIDE SERPL-SCNC: 98 MMOL/L (ref 96–106)
CO2 SERPL-SCNC: 28 MMOL/L (ref 20–29)
CREAT SERPL-MCNC: 0.89 MG/DL (ref 0.57–1)
GLUCOSE SERPL-MCNC: 83 MG/DL (ref 65–99)
POTASSIUM SERPL-SCNC: 4.4 MMOL/L (ref 3.5–5.2)
SODIUM SERPL-SCNC: 141 MMOL/L (ref 134–144)

## 2020-02-24 ENCOUNTER — HOSPITAL ENCOUNTER (OUTPATIENT)
Dept: CARDIOLOGY | Facility: HOSPITAL | Age: 75
Discharge: HOME | End: 2020-02-24
Admitting: FAMILY MEDICINE

## 2020-02-24 ENCOUNTER — APPOINTMENT (OUTPATIENT)
Dept: CARDIOLOGY | Facility: HOSPITAL | Age: 75
End: 2020-02-24

## 2020-02-24 DIAGNOSIS — R00.1 BRADYCARDIA: ICD-10-CM

## 2020-02-24 DIAGNOSIS — R42 DIZZINESS: ICD-10-CM

## 2020-02-24 PROCEDURE — 93226 XTRNL ECG REC<48 HR SCAN A/R: CPT

## 2020-02-24 PROCEDURE — 93225 XTRNL ECG REC<48 HRS REC: CPT

## 2020-02-26 PROCEDURE — 93227 XTRNL ECG REC<48 HR R&I: CPT | Performed by: INTERNAL MEDICINE

## 2020-06-02 RX ORDER — AMLODIPINE BESYLATE 2.5 MG/1
2.5 TABLET ORAL DAILY
Qty: 90 TABLET | Refills: 1 | Status: SHIPPED | OUTPATIENT
Start: 2020-06-02 | End: 2020-06-03 | Stop reason: SDUPTHER

## 2020-06-03 RX ORDER — AMLODIPINE BESYLATE 2.5 MG/1
2.5 TABLET ORAL DAILY
Qty: 90 TABLET | Refills: 1 | Status: SHIPPED | OUTPATIENT
Start: 2020-06-03 | End: 2020-08-05 | Stop reason: SDUPTHER

## 2020-06-15 ENCOUNTER — OFFICE VISIT (OUTPATIENT)
Dept: CARDIOLOGY | Facility: CLINIC | Age: 75
End: 2020-06-15

## 2020-06-15 VITALS
HEIGHT: 59 IN | DIASTOLIC BLOOD PRESSURE: 67 MMHG | HEART RATE: 56 BPM | WEIGHT: 148 LBS | BODY MASS INDEX: 29.84 KG/M2 | SYSTOLIC BLOOD PRESSURE: 123 MMHG

## 2020-06-15 DIAGNOSIS — E78.2 MIXED HYPERLIPIDEMIA: ICD-10-CM

## 2020-06-15 DIAGNOSIS — I71.20 THORACIC AORTIC ANEURYSM WITHOUT RUPTURE (HCC): ICD-10-CM

## 2020-06-15 DIAGNOSIS — I10 ESSENTIAL HYPERTENSION: Primary | ICD-10-CM

## 2020-06-15 DIAGNOSIS — I25.10 CORONARY ARTERY DISEASE INVOLVING NATIVE CORONARY ARTERY OF NATIVE HEART WITHOUT ANGINA PECTORIS: ICD-10-CM

## 2020-06-15 PROCEDURE — 99442 PR PHYS/QHP TELEPHONE EVALUATION 11-20 MIN: CPT | Performed by: NURSE PRACTITIONER

## 2020-06-15 NOTE — PROGRESS NOTES
Date of Office Visit: 06/15/2020  Encounter Provider: Zara Pedraza, MIA, APRN  Place of Service: [facility] CARDIOLOGY  Patient Name: Bird Spear  :1945        Subjective:     Chief Complaint:  Follow-up, mild CAD, thoracic aortic aneurysm      History of Present Illness:  Bird Spear is a 74 y.o. female patient of Dr. Squires.  I am doing a telehealth visit with patient and I have reviewed her records.     Patient has a history of hypertension, hyperlipidemia, renal insufficiency, hypothyroidism, mild coronary artery disease, breast cancer, probable hepatic cyst.    PER PREVIOUS OFFICE NOTE: In  she was found to have a mildly dilated aorta.  This is been followed with serial imaging studies.  She had an echocardiogram in 2016 that showed normal systolic function no significant valvular disease.  She had a nuclear stress test in 2014 that was negative for ischemia.  She had a noncontrast CT of her chest in 2015 that showed a slight increase in the ascending thoracic aortic size forms 3.7-3.9 cm. CT chest without contrast in 2017 revealed mild calcified plaque in the LAD with ectasia of the ascending measuring 3.9 cm is unchanged.  There was also moderate to large sized hiatal hernia breast changes were noted suspicious for her surgery and postop changes.  Hepatic cyst was noted.  Follow-up CT scan of the chest without contrast in 2018 showed a stable asending aorta dilated to 3.9 cm.  THE FOLLOWING IS MY CONTRIBUTION TO NOTE:  BP was high last visit and HCTZ was discontinued due to renal insufficiency and she was started on amlodipine with continuation of metoprolol. CT abdomen was concerning for hx small cortical infarct to kidney. Nephrologist did not feel this was new.  Patient was referred to vascular for further evaluation.  24 hr holter 2020 was benign.       Patient has called into the office today for a telehealth phone  follow-up appointment.  Patient reports she is feeling great since last visit.  Saw vascular surgeon who did ultrasounds and she reports everything looked normal and she does not need to follow-up routinely.  BP occasionally a little low when out in hot weather-- 113/58. Gets some occasional lightheadedness with rapid position changes.  Discussed staying hydrated, changing position more slowly, avoiding extreme heat, and calling for any worsening symptoms. Otherwise BP staying around 120s-low to mid 130s/60s-70s about an hour after meds but not sitting a good 10 minutes before checking.  Has bursitis which occasionally acts up and limits walking but otherwise doing 15-20 minutes 2-3 days a week.  Also doing yardwork in addition to this. Denies exertional symptoms or concerns. No chest pain/ discomfort, shortness of breath, shortness of breath with exertion, palpitations, fainting, falling, or abnormal bleeding.           Past Medical History:   Diagnosis Date   • Abnormal electrocardiogram    • Arthritis 2016   • Arthritis of neck    • Breast cancer, right (CMS/HCC) 2016    arimidex one year Tamoxifen one year   • Chronic pain syndrome 2016   • CKD (chronic kidney disease) stage 3, GFR 30-59 ml/min (CMS/[facility])    • Colon polyp    • Coronary artery disease involving native coronary artery of native heart without angina pectoris    • Depression    • Drug-induced Cushing's syndrome (CMS/HCC) 2018   • Edema    • Essential hypertension 2016   • Hematuria 2017   • Hx of radiation therapy    • Hyperlipidemia 2016   • Hypothyroidism (acquired) 2016   • Lumbosacral disc disease    • Renal failure    • Scoliosis    • Thoracic aortic aneurysm (CMS/HCC)    • Thyroid disease      Past Surgical History:   Procedure Laterality Date   • BREAST LUMPECTOMY Right 2014   •  SECTION     • EXTRACORPOREAL SHOCK WAVE LITHOTRIPSY (ESWL)     • HYSTERECTOMY     • LUMBAR FUSION   02/12/2012    L3,4,5     Outpatient Medications Prior to Visit   Medication Sig Dispense Refill   • amitriptyline (ELAVIL) 25 MG tablet Take 1 tablet by mouth Every Evening. 90 tablet 3   • amLODIPine (NORVASC) 2.5 MG tablet Take 1 tablet by mouth Daily. 90 tablet 1   • aspirin 325 MG EC tablet Take 325 mg by mouth Daily.     • Calcium Carbonate (CALTRATE 600) 1500 (600 CA) MG tablet Take 600 mg by mouth 2 (Two) Times a Day With Meals.     • cetirizine (zyrTEC) 10 MG tablet Take 10 mg by mouth As Needed for Allergies.     • Coenzyme Q10 (COQ10) 200 MG capsule Take 1 capsule by mouth Daily.     • DULoxetine (CYMBALTA) 60 MG capsule Take 1 capsule by mouth Daily. 90 capsule 3   • HYDROcodone-acetaminophen (NORCO) 7.5-325 MG per tablet Take 1 tablet by mouth Every 6 (Six) Hours As Needed.     • levothyroxine (SYNTHROID, LEVOTHROID) 125 MCG tablet Take 1 tablet by mouth Daily. 90 tablet 3   • lovastatin (MEVACOR) 20 MG tablet Take 1 tablet by mouth Every Night. 90 tablet 3   • metoprolol tartrate (LOPRESSOR) 50 MG tablet Take 1.5 tablets by mouth 2 (Two) Times a Day. 270 tablet 3   • Multiple Vitamin (MULTI VITAMIN) tablet Take  by mouth.     • omeprazole (PriLOSEC) 20 MG capsule Take 1 capsule by mouth as needed (reflux). 90 capsule 2   • polyethylene glycol (MIRALAX) packet Take 8.5 g by mouth As Needed.     • dicyclomine (BENTYL) 20 MG tablet TAKE 1 TABLET BY MOUTH 4 TIMES DAILY FOR 7 DAYS  0     No facility-administered medications prior to visit.        Allergies as of 06/15/2020   • (No Known Allergies)     Social History     Socioeconomic History   • Marital status:      Spouse name: Not on file   • Number of children: Not on file   • Years of education: Not on file   • Highest education level: Not on file   Tobacco Use   • Smoking status: Never Smoker   • Smokeless tobacco: Never Used   Substance and Sexual Activity   • Alcohol use: No     Comment: caffeine use   • Drug use: No   • Sexual activity: Not  "Currently     Family History   Problem Relation Age of Onset   • Heart disease Mother    • Hyperlipidemia Mother    • Diabetes Mother    • Hypertension Mother    • Heart disease Father         Coronary artery disease   • Hyperlipidemia Father    • Hypertension Father    • Heart disease Sister    • Hypertension Other        Review of Systems   Constitution:        Feels like energy levels unchanged since last visit-- good for age   Cardiovascular: Positive for leg swelling (Occasional mild if out in heat and on feet, not new or worsening over last several years). Negative for chest pain, dyspnea on exertion, palpitations (Resolved. ) and syncope.   Respiratory: Negative for shortness of breath.    Hematologic/Lymphatic: Negative for bleeding problem.   Musculoskeletal: Negative for falls.   Gastrointestinal: Negative for melena.   Genitourinary: Negative for hematuria.   Neurological: Positive for dizziness (Occasional with rapid position changes).   Psychiatric/Behavioral: Negative for altered mental status.          Objective:     Vitals:    06/15/20 1240   BP: 123/67   Pulse: 56   Weight: 67.1 kg (148 lb)   Height: 149.9 cm (59\")     Body mass index is 29.89 kg/m².        Assessment:       Diagnosis Plan   1. Essential hypertension     2. Coronary artery disease involving native coronary artery of native heart without angina pectoris     3. Mixed hyperlipidemia     4. Thoracic aortic aneurysm, without rupture           Plan:     1. Hypertension: stay hydrated, avoid extreme heat.  Call for SBP <100 or if staying >130/80 on average.  Will hold off on adjusting meds at this time given hot summer months and have her check BP more regularly.   2. Thoracic aortic aneurysm: with proceed with follow-up CT chest without contrast (given hx renal insufficiency). Asymptomatic at this time.   3. Coronary artery disease: noted by CT scan with negative stress test 2014.  Denies anginal symptoms. Continue to stay active and " notify if develop and symptoms or issues prior to next visit.   4. Hx breast cancer with right breast radiation  5. Probable hepatic cyst  6. Dyslipidemia: on statin.  Managed by outside provider.   7. Renal insufficiency: stable.   8. Hypothyroidism: managed by outside provider.  On replacement.     Patient to follow-up with Dr. Squires in 6 months or sooner if needed for any new, recurrent, or worsening symptoms or other issues/concerns.      This patient has consented to a telehealth visit via phone. The visit was scheduled as a telehealth phone visit to comply with patient safety concerns in accordance with CDC recommendations.  All vitals recorded within this visit are reported by the patient.  I spent 20 minutes in total including but not limited to the 15 minutes spent in direct conversation with this patient.              Your medication list           Accurate as of Jessica 15, 2020 12:56 PM. If you have any questions, ask your nurse or doctor.               CONTINUE taking these medications      Instructions Last Dose Given Next Dose Due   amitriptyline 25 MG tablet  Commonly known as:  ELAVIL      Take 1 tablet by mouth Every Evening.       amLODIPine 2.5 MG tablet  Commonly known as:  NORVASC      Take 1 tablet by mouth Daily.       aspirin 325 MG EC tablet      Take 325 mg by mouth Daily.       Caltrate 600 1500 (600 Ca) MG tablet  Generic drug:  Calcium Carbonate      Take 600 mg by mouth 2 (Two) Times a Day With Meals.       cetirizine 10 MG tablet  Commonly known as:  zyrTEC      Take 10 mg by mouth As Needed for Allergies.       CoQ10 200 MG capsule      Take 1 capsule by mouth Daily.       DULoxetine 60 MG capsule  Commonly known as:  CYMBALTA      Take 1 capsule by mouth Daily.       HYDROcodone-acetaminophen 7.5-325 MG per tablet  Commonly known as:  NORCO      Take 1 tablet by mouth Every 6 (Six) Hours As Needed.       levothyroxine 125 MCG tablet  Commonly known as:  SYNTHROID, LEVOTHROID      Take 1  tablet by mouth Daily.       lovastatin 20 MG tablet  Commonly known as:  Mevacor      Take 1 tablet by mouth Every Night.       metoprolol tartrate 50 MG tablet  Commonly known as:  LOPRESSOR      Take 1.5 tablets by mouth 2 (Two) Times a Day.       Multi Vitamin tablet      Take  by mouth.       omeprazole 20 MG capsule  Commonly known as:  priLOSEC      Take 1 capsule by mouth as needed (reflux).       polyethylene glycol packet  Commonly known as:  MIRALAX      Take 8.5 g by mouth As Needed.          STOP taking these medications    dicyclomine 20 MG tablet  Commonly known as:  BENTYL  Stopped by:  Zara Pedraza DNP, APRN               I did not stop or change the above medications.  Patient's medication list was updated to reflect medications they are currently taking including medication changes made by other providers.        Thanks,    Zara Pedraza DNP, APRN  06/15/2020         Dictated utilizing Dragon dictation

## 2020-07-01 ENCOUNTER — HOSPITAL ENCOUNTER (OUTPATIENT)
Dept: CT IMAGING | Facility: HOSPITAL | Age: 75
Discharge: HOME | End: 2020-07-01
Admitting: NURSE PRACTITIONER

## 2020-07-01 DIAGNOSIS — I71.20 THORACIC AORTIC ANEURYSM WITHOUT RUPTURE (HCC): ICD-10-CM

## 2020-07-01 PROCEDURE — 71250 CT THORAX DX C-: CPT

## 2020-07-06 ENCOUNTER — TELEPHONE (OUTPATIENT)
Dept: CARDIOLOGY | Facility: CLINIC | Age: 75
End: 2020-07-06

## 2020-07-06 DIAGNOSIS — I71.20 THORACIC AORTIC ANEURYSM WITHOUT RUPTURE (HCC): Primary | ICD-10-CM

## 2020-07-07 NOTE — TELEPHONE ENCOUNTER
I called and spoke with patient and discussed all of the below.  She reports BP and HR well controlled.  BP running 110s-120s/60s-70s on average (occasionally up 140 but not often), HR 50s on average on 50mg metoprolol.  She is amenable to cardiothoracics referral and will call if she has not received info within 1 week.  She will avoid heavy lifting and go to ER for chest pain.  Will call if BP starts to stay above 130/80 on average.

## 2020-07-07 NOTE — TELEPHONE ENCOUNTER
CT chest shows further enlargement of ascending aorta. Recommend referral to cardiothoracic surgery for further evaluation/ monitoring.  Ensure BP well controlled.  Avoid heavy lifting > 20 lb.     Recommend following-up with PCP on minimal linear lung scarring and calcified granulomas.  Also follow-up with PCP on hiatal hernia which is unchanged from previous imaging and hepatic cysts whicb continue to be seen.

## 2020-07-22 ENCOUNTER — OFFICE VISIT (OUTPATIENT)
Dept: CARDIAC SURGERY | Facility: CLINIC | Age: 75
End: 2020-07-22

## 2020-07-22 VITALS
HEART RATE: 61 BPM | DIASTOLIC BLOOD PRESSURE: 87 MMHG | BODY MASS INDEX: 30.24 KG/M2 | WEIGHT: 150 LBS | RESPIRATION RATE: 20 BRPM | HEIGHT: 59 IN | TEMPERATURE: 97.6 F | OXYGEN SATURATION: 98 % | SYSTOLIC BLOOD PRESSURE: 138 MMHG

## 2020-07-22 DIAGNOSIS — I77.810 THORACIC AORTIC ECTASIA (HCC): Primary | ICD-10-CM

## 2020-07-22 PROCEDURE — 99203 OFFICE O/P NEW LOW 30 MIN: CPT | Performed by: NURSE PRACTITIONER

## 2020-07-22 NOTE — PROGRESS NOTES
7/22/2020      Subjective:      Kehrer, Meredith Lea, MD    Chief Complaint: Thoracic aortic dilatation    History of Present Illness:       Dear Dr. Kehrer, Meredith Lea, MD and Colleagues,    It was nice to see Bird Spear in consultation at your request, Dr. Javed has asked me to evaluate her on her initial visit.  She is a 74 y.o. female with a history of ascending aortic dilatation measuring at 3.9 cm in 2018 that was found incidentally in 2012.  She has been followed by serial CTs by her primary care and her cardiologist Dr. Squires, the patient states that she was referred to our office due to interval growth of her aorta.  An echocardiogram 1/2016 demonstrated tricuspid aortic valve.  The CT of chest on 7/1/2020 was reviewed and is consistent with the radiologists reading of ascending aortic dilatation of 4.3 cm, DTA 2.6 cm.  Incidental findings on her CT include left lower lobe granulomas, hepatic cysts, and hiatal hernia.  She denies shortness of breath, chest/back pain, numbness/tingling/pain of extremities.  No vascular deficiencies or hyperextensible or hypermobile joints are noted on exam.  The patient's family history is negative for aneurysms or dissections, negative for connective tissue disease, and positive for coronary disease in first degree family members with her father having had an MI in his 50s, her sister expiring from CHF complications at age 80, and mother expiring from sudden death at age 52.      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  Lives with  of 56 years, is a retired , states she is  active with yard work and intermittent walking in her neighborhood 2 to 3 days a week    Family history: As above      Patient Active Problem List   Diagnosis   • Chronic pain syndrome   • Arthritis   • Essential hypertension   • Hypothyroidism   • Hyperlipidemia   • Reflux esophagitis   • Abnormal electrocardiogram   • Degenerative spondylolisthesis   • Spinal stenosis of lumbar region   • Vaginal atrophy   • Thoracic aortic aneurysm, without rupture   • Greater trochanteric bursitis of both hips   • Tendinopathy of rotator cuff, right   • Acromioclavicular joint arthritis   • CKD (chronic kidney disease) stage 3, GFR 30-59 ml/min (CMS/HCC)   • Hiatal hernia   • Coronary artery disease involving native coronary artery of native heart without angina pectoris   • Reactive depression   • Drug-induced constipation   • Bradycardia   • Renal atrophy, bilateral       Past Medical History:   Diagnosis Date   • Abnormal electrocardiogram    • Arthritis 2016   • Arthritis of neck    • Breast cancer, right (CMS/HCC) 2016    arimidex one year Tamoxifen one year   • Chronic pain syndrome 2016   • CKD (chronic kidney disease) stage 3, GFR 30-59 ml/min (CMS/HCC)    • Colon polyp    • Coronary artery disease involving native coronary artery of native heart without angina pectoris    • Depression    • Drug-induced Cushing's syndrome (CMS/HCC) 2018   • Edema    • Essential hypertension 2016   • Hematuria 2017   • Hx of radiation therapy    • Hyperlipidemia 2016   • Hypothyroidism (acquired) 2016   • Lumbosacral disc disease    • Renal failure    • Scoliosis    • Thoracic aortic aneurysm (CMS/HCC)    • Thyroid disease        Past Surgical History:   Procedure Laterality Date   • BREAST LUMPECTOMY Right 2014   •  SECTION     • EXTRACORPOREAL SHOCK WAVE LITHOTRIPSY (ESWL)     • HYSTERECTOMY     • LUMBAR FUSION  2012    L3,4,5       No Known Allergies      Current  Outpatient Medications:   •  amitriptyline (ELAVIL) 25 MG tablet, Take 1 tablet by mouth Every Evening., Disp: 90 tablet, Rfl: 3  •  amLODIPine (NORVASC) 2.5 MG tablet, Take 1 tablet by mouth Daily., Disp: 90 tablet, Rfl: 1  •  aspirin 325 MG EC tablet, Take 325 mg by mouth Daily., Disp: , Rfl:   •  Calcium Carbonate (CALTRATE 600) 1500 (600 CA) MG tablet, Take 600 mg by mouth 2 (Two) Times a Day With Meals., Disp: , Rfl:   •  cetirizine (zyrTEC) 10 MG tablet, Take 10 mg by mouth As Needed for Allergies., Disp: , Rfl:   •  Coenzyme Q10 (COQ10) 200 MG capsule, Take 1 capsule by mouth Daily., Disp: , Rfl:   •  DULoxetine (CYMBALTA) 60 MG capsule, Take 1 capsule by mouth Daily., Disp: 90 capsule, Rfl: 3  •  HYDROcodone-acetaminophen (NORCO) 7.5-325 MG per tablet, Take 1 tablet by mouth Every 6 (Six) Hours As Needed., Disp: , Rfl:   •  levothyroxine (SYNTHROID, LEVOTHROID) 125 MCG tablet, Take 1 tablet by mouth Daily., Disp: 90 tablet, Rfl: 3  •  lovastatin (MEVACOR) 20 MG tablet, Take 1 tablet by mouth Every Night., Disp: 90 tablet, Rfl: 3  •  metoprolol tartrate (LOPRESSOR) 50 MG tablet, Take 1.5 tablets by mouth 2 (Two) Times a Day., Disp: 270 tablet, Rfl: 3  •  Multiple Vitamin (MULTI VITAMIN) tablet, Take  by mouth., Disp: , Rfl:   •  omeprazole (PriLOSEC) 20 MG capsule, Take 1 capsule by mouth as needed (reflux)., Disp: 90 capsule, Rfl: 2  •  polyethylene glycol (MIRALAX) packet, Take 8.5 g by mouth As Needed., Disp: , Rfl:     Social History     Socioeconomic History   • Marital status:      Spouse name: Not on file   • Number of children: Not on file   • Years of education: Not on file   • Highest education level: Not on file   Tobacco Use   • Smoking status: Never Smoker   • Smokeless tobacco: Never Used   Substance and Sexual Activity   • Alcohol use: No     Comment: caffeine use   • Drug use: No   • Sexual activity: Not Currently       Family History   Problem Relation Age of Onset   • Heart disease  Mother    • Hyperlipidemia Mother    • Diabetes Mother    • Hypertension Mother    • Heart disease Father         Coronary artery disease   • Hyperlipidemia Father    • Hypertension Father    • Heart disease Sister    • Hypertension Other        The following portions of the patient's history were reviewed and updated as appropriate: allergies, current medications, past family history, past medical history, past social history, past surgical history and problem list.    Review of Systems:  Review of Systems   Constitutional: Negative for activity change and fatigue.   Respiratory: Negative for apnea, chest tightness and shortness of breath.    Cardiovascular: Negative for chest pain and palpitations.   Gastrointestinal: Negative for nausea and vomiting.   Skin: Negative for pallor.   Neurological: Negative for dizziness, syncope, weakness, light-headedness and numbness.   All other systems reviewed and are negative.      Physical Exam:    Vital Signs:  Weight: 68 kg (150 lb)   Body mass index is 30.3 kg/m².  Temp: 97.6 °F (36.4 °C)   Heart Rate: 61   BP: 138/87     Physical Exam   Constitutional: She is oriented to person, place, and time. She appears well-developed and well-nourished. She is cooperative.   Neck: Normal carotid pulses and no JVD present. Carotid bruit is not present.   Cardiovascular: Normal rate, regular rhythm, normal heart sounds and intact distal pulses. Exam reveals no gallop and no friction rub.   No murmur heard.  Pulses:       Carotid pulses are 2+ on the right side, and 2+ on the left side.       Radial pulses are 2+ on the right side, and 2+ on the left side.        Posterior tibial pulses are 2+ on the right side, and 2+ on the left side.   Pulmonary/Chest: Effort normal and breath sounds normal. She has no decreased breath sounds. She has no wheezes. She has no rhonchi. She has no rales.   Abdominal: Soft. Bowel sounds are normal. She exhibits no distension and no mass. There is no  hepatosplenomegaly. There is no tenderness. There is no guarding.   Musculoskeletal: She exhibits no edema.   Neurological: She is alert and oriented to person, place, and time.   Skin: Skin is warm and dry. No rash noted. No erythema. No pallor.   Psychiatric: She has a normal mood and affect. Her speech is normal and behavior is normal. Judgment and thought content normal.        Assessment:     1.  Ascending aortic dilatation----mild interval growth to 4.3 cm  2.  Hypertension----mildly elevated in the office today, patient states runs systolic 120-130 at home  3.  CKD II, history renal cortical infarct  4.  Hyperlipidemia----on statin, followed by primary  5.  Right breast cancer status post lumpectomy and radiation  6.  Chronic back pain----on chronic narcotics    Recommendation/Plan:     No surgical intervention is warranted at this time with less than 5 mm growth in 1 year and aortic dilatation size, although surgical threshold is lower with her diminutive stature.  Patient states that should her aorta ever reach a size that would require surgery, she is fairly certain that she would not wish to pursue surgery.  The patient states that she does want to continue following for now.    CT of the chest without contrast in 1 year prior to office appointment    We discussed the importance of avoidance of over the counter decongestants and stimulants, specifically pseudoephedrine, for hypertension and aneurysm management    Risk factor modification of hypertension with a goal blood pressure less than 130/80, hyperlipidemia optimization, and continued avoidance of tobacco/nicotine products.    Continuation of low aerobic exercise is indicated to help reduce body habitus, assist with blood pressure and cholesterol control.         Thank you for allowing us to participate in her care.    Regards,    DARINEL Cali    ** all problems new to this practitioner, extensive review of records prior and during patient  interview, >25 minutes in face to face conversation regarding treatment plan, education, and answering any questions the patient may have had

## 2020-07-23 PROBLEM — I77.810 THORACIC AORTIC ECTASIA: Status: ACTIVE | Noted: 2017-01-30

## 2020-08-05 ENCOUNTER — OFFICE VISIT (OUTPATIENT)
Dept: FAMILY MEDICINE CLINIC | Facility: CLINIC | Age: 75
End: 2020-08-05

## 2020-08-05 VITALS
DIASTOLIC BLOOD PRESSURE: 72 MMHG | BODY MASS INDEX: 30.68 KG/M2 | OXYGEN SATURATION: 99 % | SYSTOLIC BLOOD PRESSURE: 128 MMHG | HEIGHT: 59 IN | HEART RATE: 60 BPM | TEMPERATURE: 97.3 F | WEIGHT: 152.2 LBS

## 2020-08-05 DIAGNOSIS — N26.1 RENAL ATROPHY, BILATERAL: ICD-10-CM

## 2020-08-05 DIAGNOSIS — E78.2 MIXED HYPERLIPIDEMIA: ICD-10-CM

## 2020-08-05 DIAGNOSIS — I25.10 CORONARY ARTERY DISEASE INVOLVING NATIVE CORONARY ARTERY OF NATIVE HEART WITHOUT ANGINA PECTORIS: ICD-10-CM

## 2020-08-05 DIAGNOSIS — N18.30 CKD (CHRONIC KIDNEY DISEASE) STAGE 3, GFR 30-59 ML/MIN (HCC): ICD-10-CM

## 2020-08-05 DIAGNOSIS — Z11.59 NEED FOR HEPATITIS C SCREENING TEST: ICD-10-CM

## 2020-08-05 DIAGNOSIS — E03.9 ACQUIRED HYPOTHYROIDISM: ICD-10-CM

## 2020-08-05 DIAGNOSIS — I10 ESSENTIAL HYPERTENSION: Primary | ICD-10-CM

## 2020-08-05 DIAGNOSIS — F32.9 REACTIVE DEPRESSION: ICD-10-CM

## 2020-08-05 DIAGNOSIS — Z12.31 ENCOUNTER FOR SCREENING MAMMOGRAM FOR MALIGNANT NEOPLASM OF BREAST: ICD-10-CM

## 2020-08-05 DIAGNOSIS — I77.810 THORACIC AORTIC ECTASIA (HCC): ICD-10-CM

## 2020-08-05 PROBLEM — K59.03 DRUG-INDUCED CONSTIPATION: Status: RESOLVED | Noted: 2019-12-12 | Resolved: 2020-08-05

## 2020-08-05 PROCEDURE — G0439 PPPS, SUBSEQ VISIT: HCPCS | Performed by: FAMILY MEDICINE

## 2020-08-05 RX ORDER — METOPROLOL TARTRATE 50 MG/1
75 TABLET, FILM COATED ORAL 2 TIMES DAILY
Qty: 270 TABLET | Refills: 3 | Status: SHIPPED | OUTPATIENT
Start: 2020-08-05 | End: 2021-01-19

## 2020-08-05 RX ORDER — AMLODIPINE BESYLATE 2.5 MG/1
2.5 TABLET ORAL DAILY
Qty: 90 TABLET | Refills: 1 | Status: SHIPPED | OUTPATIENT
Start: 2020-08-05 | End: 2021-02-05

## 2020-08-05 RX ORDER — LOVASTATIN 20 MG/1
20 TABLET ORAL NIGHTLY
Qty: 90 TABLET | Refills: 3
Start: 2020-08-05 | End: 2020-08-07

## 2020-08-05 RX ORDER — AMITRIPTYLINE HYDROCHLORIDE 25 MG/1
25 TABLET, FILM COATED ORAL EVERY EVENING
Qty: 90 TABLET | Refills: 3 | Status: SHIPPED | OUTPATIENT
Start: 2020-08-05 | End: 2021-08-06 | Stop reason: SDUPTHER

## 2020-08-05 RX ORDER — LEVOTHYROXINE SODIUM 0.12 MG/1
125 TABLET ORAL DAILY
Qty: 90 TABLET | Refills: 3 | Status: SHIPPED | OUTPATIENT
Start: 2020-08-05 | End: 2021-08-06 | Stop reason: SDUPTHER

## 2020-08-05 RX ORDER — DULOXETIN HYDROCHLORIDE 60 MG/1
60 CAPSULE, DELAYED RELEASE ORAL DAILY
Qty: 90 CAPSULE | Refills: 3 | Status: SHIPPED | OUTPATIENT
Start: 2020-08-05 | End: 2021-08-06 | Stop reason: SDUPTHER

## 2020-08-05 NOTE — PATIENT INSTRUCTIONS
Medicare Wellness  Personal Prevention Plan of Service     Date of Office Visit:  2020  Encounter Provider:  Meredith Lea Kehrer, MD  Place of Service:  [facility]  Patient Name: Bird Spear  :  1945    As part of the Medicare Wellness portion of your visit today, we are providing you with this personalized preventive plan of services (PPPS). This plan is based upon recommendations of the [place] Preventive Services Task Force (USPSTF) and the Advisory Committee on Immunization Practices (ACIP).    This lists the preventive care services that should be considered, and provides dates of when you are due. Items listed as completed are up-to-date and do not require any further intervention.    Health Maintenance   Topic Date Due   • HEPATITIS C SCREENING  2016   • MEDICARE ANNUAL WELLNESS  2016   • MAMMOGRAM  2020   • LIPID PANEL  2020   • INFLUENZA VACCINE  2020   • TDAP/TD VACCINES (2 - Td) 2021   • COLONOSCOPY  2023   • Pneumococcal Vaccine Once at 65 Years Old  Completed   • ZOSTER VACCINE  Completed       Orders Placed This Encounter   Procedures   • Mammo Screening Digital Tomosynthesis Bilateral With CAD     Standing Status:   Future     Standing Expiration Date:   2021     Order Specific Question:   Reason for Exam:     Answer:   screening   • TSH   • Comprehensive Metabolic Panel   • Lipid Panel   • Hepatitis C Antibody   • CBC & Differential     Order Specific Question:   Manual Differential     Answer:   No       Return in about 6 months (around 2021) for Recheck bp and thyroid.

## 2020-08-05 NOTE — PROGRESS NOTES
The ABCs of the Annual Wellness Visit  Subsequent Medicare Wellness Visit    Chief Complaint   Patient presents with   • Medicare Wellness-subsequent       Subjective   History of Present Illness:  Bird Spear is a 74 y.o. female who presents for a Subsequent Medicare Wellness Visit.  Patient here for her Medicare wellness exam.  She is doing well in general without significant complaint.  She is practicing good social distancing because of her high risk.  She has had some bursitis.  She is up-to-date with pain management.  She just had a repeat CT scan of her thoracic aneurysm and is update with cardiology as well.  She has need of some refills today.  She is fasting for her blood work.    HEALTH RISK ASSESSMENT    Recent Hospitalizations:  No hospitalization(s) within the last year.    Current Medical Providers:  Patient Care Team:  Kehrer, Meredith Lea, MD as PCP - General (Family Medicine)  Armond Kilpatrick MD as Consulting Physician (Orthopedic Surgery)  Giulia Squires MD as Consulting Physician (Cardiology)  Fortino Sánchez MD as Consulting Physician (Medical Oncology)  Kb Brooks MD as Consulting Physician (Gastroenterology)  Sandro Madison MD as Consulting Physician (Pain Medicine)    Smoking Status:  Social History     Tobacco Use   Smoking Status Never Smoker   Smokeless Tobacco Never Used       Alcohol Consumption:  Social History     Substance and Sexual Activity   Alcohol Use No    Comment: caffeine use       Depression Screen:   PHQ-2/PHQ-9 Depression Screening 8/5/2020   Little interest or pleasure in doing things 0   Feeling down, depressed, or hopeless 0   Trouble falling or staying asleep, or sleeping too much 1   Feeling tired or having little energy 1   Poor appetite or overeating 0   Feeling bad about yourself - or that you are a failure or have let yourself or your family down 0   Trouble concentrating on things, such as reading the newspaper or watching television 0    Moving or speaking so slowly that other people could have noticed. Or the opposite - being so fidgety or restless that you have been moving around a lot more than usual 0   Thoughts that you would be better off dead, or of hurting yourself in some way 0   Total Score 2   If you checked off any problems, how difficult have these problems made it for you to do your work, take care of things at home, or get along with other people? Not difficult at all       Fall Risk Screen:  JOLENE Fall Risk Assessment was completed, and patient is at LOW risk for falls.Assessment completed on:8/5/2020    Health Habits and Functional and Cognitive Screening:  Functional & Cognitive Status 8/5/2020   Do you have difficulty preparing food and eating? No   Do you have difficulty bathing yourself, getting dressed or grooming yourself? No   Do you have difficulty using the toilet? No   Do you have difficulty moving around from place to place? No   Do you have trouble with steps or getting out of a bed or a chair? Yes   Current Diet Well Balanced Diet   Dental Exam Up to date   Eye Exam Up to date   Exercise (times per week) 2 times per week   Current Exercise Activities Include Walking   Do you need help using the phone?  No   Are you deaf or do you have serious difficulty hearing?  No   Do you need help with transportation? No   Do you need help shopping? No   Do you need help preparing meals?  No   Do you need help with housework?  No   Do you need help with laundry? No   Do you need help taking your medications? No   Do you need help managing money? No   Do you ever drive or ride in a car without wearing a seat belt? No   Have you felt unusual stress, anger or loneliness in the last month? No   Who do you live with? Spouse   If you need help, do you have trouble finding someone available to you? No   Have you been bothered in the last four weeks by sexual problems? No   Do you have difficulty concentrating, remembering or making  decisions? No         Does the patient have evidence of cognitive impairment? No    Asprin use counseling:Taking ASA appropriately as indicated    Age-appropriate Screening Schedule:  Refer to the list below for future screening recommendations based on patient's age, sex and/or medical conditions. Orders for these recommended tests are listed in the plan section. The patient has been provided with a written plan.    Health Maintenance   Topic Date Due   • MAMMOGRAM  01/28/2020   • LIPID PANEL  06/13/2020   • INFLUENZA VACCINE  08/01/2020   • TDAP/TD VACCINES (2 - Td) 04/05/2021   • COLONOSCOPY  11/19/2023   • ZOSTER VACCINE  Completed          The following portions of the patient's history were reviewed and updated as appropriate: allergies, current medications, past family history, past medical history, past social history, past surgical history and problem list.    Outpatient Medications Prior to Visit   Medication Sig Dispense Refill   • aspirin 325 MG EC tablet Take 325 mg by mouth Daily.     • Calcium Carbonate (CALTRATE 600) 1500 (600 CA) MG tablet Take 600 mg by mouth 2 (Two) Times a Day With Meals.     • cetirizine (zyrTEC) 10 MG tablet Take 10 mg by mouth As Needed for Allergies.     • Coenzyme Q10 (COQ10) 200 MG capsule Take 1 capsule by mouth Daily.     • HYDROcodone-acetaminophen (NORCO) 7.5-325 MG per tablet Take 1 tablet by mouth Every 6 (Six) Hours As Needed.     • Multiple Vitamin (MULTI VITAMIN) tablet Take  by mouth.     • omeprazole (PriLOSEC) 20 MG capsule Take 1 capsule by mouth as needed (reflux). 90 capsule 2   • polyethylene glycol (MIRALAX) packet Take 8.5 g by mouth As Needed.     • amitriptyline (ELAVIL) 25 MG tablet Take 1 tablet by mouth Every Evening. 90 tablet 3   • amLODIPine (NORVASC) 2.5 MG tablet Take 1 tablet by mouth Daily. 90 tablet 1   • DULoxetine (CYMBALTA) 60 MG capsule Take 1 capsule by mouth Daily. 90 capsule 3   • levothyroxine (SYNTHROID, LEVOTHROID) 125 MCG tablet  Take 1 tablet by mouth Daily. 90 tablet 3   • lovastatin (MEVACOR) 20 MG tablet Take 1 tablet by mouth Every Night. 90 tablet 3   • metoprolol tartrate (LOPRESSOR) 50 MG tablet Take 1.5 tablets by mouth 2 (Two) Times a Day. 270 tablet 3     No facility-administered medications prior to visit.        Patient Active Problem List   Diagnosis   • Chronic pain syndrome   • Arthritis   • Essential hypertension   • Hypothyroidism   • Hyperlipidemia   • Reflux esophagitis   • Abnormal electrocardiogram   • Degenerative spondylolisthesis   • Spinal stenosis of lumbar region   • Thoracic aortic ectasia (CMS/HCC)   • Greater trochanteric bursitis of both hips   • Tendinopathy of rotator cuff, right   • Acromioclavicular joint arthritis   • CKD (chronic kidney disease) stage 3, GFR 30-59 ml/min (CMS/HCC)   • Hiatal hernia   • Coronary artery disease involving native coronary artery of native heart without angina pectoris   • Reactive depression   • Bradycardia   • Renal atrophy, bilateral       Advanced Care Planning:  ACP discussion was held with the patient during this visit. Patient has an advance directive (not in EMR), copy requested.    Review of Systems   Constitutional: Negative for chills, fatigue and fever.   HENT: Negative for congestion, rhinorrhea and sore throat.    Respiratory: Negative for cough and shortness of breath.    Cardiovascular: Negative for chest pain and leg swelling.   Gastrointestinal: Negative for abdominal pain.   Endocrine: Negative for polydipsia and polyuria.   Genitourinary: Negative for dysuria.   Musculoskeletal: Positive for arthralgias and back pain. Negative for myalgias.   Skin: Negative for rash.   Neurological: Negative for dizziness.   Hematological: Does not bruise/bleed easily.   Psychiatric/Behavioral: Negative for sleep disturbance.       Compared to one year ago, the patient feels her physical health is worse.  Compared to one year ago, the patient feels her mental health is the  "same.    Reviewed chart for potential of high risk medication in the elderly: yes  Reviewed chart for potential of harmful drug interactions in the elderly:yes    Objective         Vitals:    08/05/20 1309   BP: 128/72   Pulse: 60   Temp: 97.3 °F (36.3 °C)   SpO2: 99%   Weight: 69 kg (152 lb 3.2 oz)   Height: 149.9 cm (59\")       Body mass index is 30.74 kg/m².  Discussed the patient's BMI with her. The BMI is above average; BMI management plan is completed.    Physical Exam   Constitutional: She is oriented to person, place, and time. She appears well-developed and well-nourished.   HENT:   Head: Normocephalic and atraumatic.   Mouth/Throat: Oropharynx is clear and moist.   Eyes: Pupils are equal, round, and reactive to light. EOM are normal.   Neck: Neck supple. No thyromegaly present.   Cardiovascular: Normal rate and regular rhythm.   Murmur heard.  Pulmonary/Chest: Effort normal and breath sounds normal. She has no wheezes.   Abdominal: Soft. Bowel sounds are normal. There is no tenderness.   Musculoskeletal: Normal range of motion. She exhibits no edema.   Neurological: She is alert and oriented to person, place, and time.   Skin: Skin is warm and dry.   Psychiatric: She has a normal mood and affect.             Assessment/Plan   Medicare Risks and Personalized Health Plan  CMS Preventative Services Quick Reference  Advance Directive Discussion  Obesity/Overweight     The above risks/problems have been discussed with the patient.  Pertinent information has been shared with the patient in the After Visit Summary.  Follow up plans and orders are seen below in the Assessment/Plan Section.    Diagnoses and all orders for this visit:    1. Essential hypertension (Primary)  -     CBC & Differential  -     Comprehensive Metabolic Panel  -     amLODIPine (NORVASC) 2.5 MG tablet; Take 1 tablet by mouth Daily.  Dispense: 90 tablet; Refill: 1  -     metoprolol tartrate (LOPRESSOR) 50 MG tablet; Take 1.5 tablets by mouth " 2 (Two) Times a Day.  Dispense: 270 tablet; Refill: 3    2. Mixed hyperlipidemia  -     Lipid Panel  -     lovastatin (Mevacor) 20 MG tablet; Take 1 tablet by mouth Every Night.  Dispense: 90 tablet; Refill: 3    3. Thoracic aortic ectasia (CMS/HCC)    4. Coronary artery disease involving native coronary artery of native heart without angina pectoris    5. Acquired hypothyroidism  -     TSH  -     levothyroxine (SYNTHROID, LEVOTHROID) 125 MCG tablet; Take 1 tablet by mouth Daily.  Dispense: 90 tablet; Refill: 3    6. CKD (chronic kidney disease) stage 3, GFR 30-59 ml/min (CMS/HCC)    7. Renal atrophy, bilateral    8. Reactive depression  -     amitriptyline (ELAVIL) 25 MG tablet; Take 1 tablet by mouth Every Evening.  Dispense: 90 tablet; Refill: 3  -     DULoxetine (CYMBALTA) 60 MG capsule; Take 1 capsule by mouth Daily.  Dispense: 90 capsule; Refill: 3    9. Encounter for screening mammogram for malignant neoplasm of breast  -     Mammo Screening Digital Tomosynthesis Bilateral With CAD; Future    10. Need for hepatitis C screening test  -     Hepatitis C Antibody      Follow Up:  Return in about 6 months (around 2/5/2021) for Recheck bp and thyroid.     An After Visit Summary and PPPS were given to the patient.

## 2020-08-06 LAB
ALBUMIN SERPL-MCNC: 4.8 G/DL (ref 3.7–4.7)
ALBUMIN/GLOB SERPL: 2.4 {RATIO} (ref 1.2–2.2)
ALP SERPL-CCNC: 68 IU/L (ref 39–117)
ALT SERPL-CCNC: 15 IU/L (ref 0–32)
AST SERPL-CCNC: 22 IU/L (ref 0–40)
BASOPHILS # BLD AUTO: 0 X10E3/UL (ref 0–0.2)
BASOPHILS NFR BLD AUTO: 1 %
BILIRUB SERPL-MCNC: 0.4 MG/DL (ref 0–1.2)
BUN SERPL-MCNC: 19 MG/DL (ref 8–27)
BUN/CREAT SERPL: 17 (ref 12–28)
CALCIUM SERPL-MCNC: 9.7 MG/DL (ref 8.7–10.3)
CHLORIDE SERPL-SCNC: 101 MMOL/L (ref 96–106)
CHOLEST SERPL-MCNC: 229 MG/DL (ref 100–199)
CO2 SERPL-SCNC: 26 MMOL/L (ref 20–29)
CREAT SERPL-MCNC: 1.11 MG/DL (ref 0.57–1)
EOSINOPHIL # BLD AUTO: 0.1 X10E3/UL (ref 0–0.4)
EOSINOPHIL NFR BLD AUTO: 2 %
ERYTHROCYTE [DISTWIDTH] IN BLOOD BY AUTOMATED COUNT: 13.5 % (ref 11.7–15.4)
GLOBULIN SER CALC-MCNC: 2 G/DL (ref 1.5–4.5)
GLUCOSE SERPL-MCNC: 97 MG/DL (ref 65–99)
HCT VFR BLD AUTO: 35.3 % (ref 34–46.6)
HCV AB S/CO SERPL IA: <0.1 S/CO RATIO (ref 0–0.9)
HDLC SERPL-MCNC: 66 MG/DL
HGB BLD-MCNC: 11.4 G/DL (ref 11.1–15.9)
IMM GRANULOCYTES # BLD AUTO: 0 X10E3/UL (ref 0–0.1)
IMM GRANULOCYTES NFR BLD AUTO: 0 %
LDLC SERPL CALC-MCNC: 146 MG/DL (ref 0–99)
LYMPHOCYTES # BLD AUTO: 1 X10E3/UL (ref 0.7–3.1)
LYMPHOCYTES NFR BLD AUTO: 25 %
MCH RBC QN AUTO: 28.9 PG (ref 26.6–33)
MCHC RBC AUTO-ENTMCNC: 32.3 G/DL (ref 31.5–35.7)
MCV RBC AUTO: 89 FL (ref 79–97)
MONOCYTES # BLD AUTO: 0.4 X10E3/UL (ref 0.1–0.9)
MONOCYTES NFR BLD AUTO: 11 %
NEUTROPHILS # BLD AUTO: 2.3 X10E3/UL (ref 1.4–7)
NEUTROPHILS NFR BLD AUTO: 61 %
PLATELET # BLD AUTO: 259 X10E3/UL (ref 150–450)
POTASSIUM SERPL-SCNC: 4.7 MMOL/L (ref 3.5–5.2)
PROT SERPL-MCNC: 6.8 G/DL (ref 6–8.5)
RBC # BLD AUTO: 3.95 X10E6/UL (ref 3.77–5.28)
SODIUM SERPL-SCNC: 140 MMOL/L (ref 134–144)
TRIGL SERPL-MCNC: 87 MG/DL (ref 0–149)
TSH SERPL DL<=0.005 MIU/L-ACNC: 2.36 UIU/ML (ref 0.45–4.5)
VLDLC SERPL CALC-MCNC: 17 MG/DL (ref 5–40)
WBC # BLD AUTO: 3.8 X10E3/UL (ref 3.4–10.8)

## 2020-08-07 DIAGNOSIS — E78.2 MIXED HYPERLIPIDEMIA: Primary | ICD-10-CM

## 2020-08-07 DIAGNOSIS — I25.10 CORONARY ARTERY DISEASE INVOLVING NATIVE CORONARY ARTERY OF NATIVE HEART WITHOUT ANGINA PECTORIS: ICD-10-CM

## 2020-08-07 RX ORDER — ATORVASTATIN CALCIUM 40 MG/1
40 TABLET, FILM COATED ORAL NIGHTLY
Qty: 90 TABLET | Refills: 3 | Status: SHIPPED | OUTPATIENT
Start: 2020-08-07 | End: 2020-08-10 | Stop reason: SDUPTHER

## 2020-08-10 DIAGNOSIS — E78.2 MIXED HYPERLIPIDEMIA: ICD-10-CM

## 2020-08-10 DIAGNOSIS — I25.10 CORONARY ARTERY DISEASE INVOLVING NATIVE CORONARY ARTERY OF NATIVE HEART WITHOUT ANGINA PECTORIS: ICD-10-CM

## 2020-08-10 RX ORDER — ATORVASTATIN CALCIUM 40 MG/1
40 TABLET, FILM COATED ORAL NIGHTLY
Qty: 90 TABLET | Refills: 3 | Status: SHIPPED | OUTPATIENT
Start: 2020-08-10 | End: 2021-02-05 | Stop reason: SINTOL

## 2020-09-24 ENCOUNTER — TELEPHONE (OUTPATIENT)
Dept: FAMILY MEDICINE CLINIC | Facility: CLINIC | Age: 75
End: 2020-09-24

## 2020-10-29 ENCOUNTER — APPOINTMENT (OUTPATIENT)
Dept: WOMENS IMAGING | Facility: HOSPITAL | Age: 75
End: 2020-10-29

## 2020-10-29 PROCEDURE — 77067 SCR MAMMO BI INCL CAD: CPT | Performed by: RADIOLOGY

## 2020-10-29 PROCEDURE — 77063 BREAST TOMOSYNTHESIS BI: CPT | Performed by: RADIOLOGY

## 2020-10-30 DIAGNOSIS — Z12.31 ENCOUNTER FOR SCREENING MAMMOGRAM FOR MALIGNANT NEOPLASM OF BREAST: ICD-10-CM

## 2020-12-16 ENCOUNTER — OFFICE VISIT (OUTPATIENT)
Dept: CARDIOLOGY | Facility: CLINIC | Age: 75
End: 2020-12-16

## 2020-12-16 VITALS
DIASTOLIC BLOOD PRESSURE: 74 MMHG | BODY MASS INDEX: 29.84 KG/M2 | SYSTOLIC BLOOD PRESSURE: 118 MMHG | WEIGHT: 148 LBS | HEIGHT: 59 IN | HEART RATE: 51 BPM

## 2020-12-16 DIAGNOSIS — I10 ESSENTIAL HYPERTENSION: ICD-10-CM

## 2020-12-16 DIAGNOSIS — N18.30 STAGE 3 CHRONIC KIDNEY DISEASE, UNSPECIFIED WHETHER STAGE 3A OR 3B CKD (HCC): ICD-10-CM

## 2020-12-16 DIAGNOSIS — I77.810 THORACIC AORTIC ECTASIA (HCC): Primary | ICD-10-CM

## 2020-12-16 DIAGNOSIS — I25.10 CORONARY ARTERY DISEASE INVOLVING NATIVE CORONARY ARTERY OF NATIVE HEART WITHOUT ANGINA PECTORIS: ICD-10-CM

## 2020-12-16 PROCEDURE — 99441 PR PHYS/QHP TELEPHONE EVALUATION 5-10 MIN: CPT | Performed by: INTERNAL MEDICINE

## 2020-12-16 NOTE — PROGRESS NOTES
Date of Office Visit: 2020  Encounter Provider: Giulia Squires MD  Place of Service: [facility] CARDIOLOGY  Patient Name: Brid Spear  :1945    Chief complaint  Thoracic aortic aneurysm, coronary artery disease noted on coronary calcium score.    History of Present Illness  Patient is a pleasant 75-year-old female with history of hypertension, hyperlipidemia, renal insufficiency, hypothyroidism.  In  she was found to have a mildly dilated aorta.  This is been followed with serial imaging studies.  She had an echocardiogram in 2016 that showed normal systolic function no significant valvular disease.  She had a nuclear stress test in 2014 that was negative for ischemia.  She had a noncontrast CT of her chest in 2015 that showed a slight increase in the ascending thoracic aortic size forms 3.7-3.9 cm. CT chest without contrast in 2017 revealed mild calcified plaque in the LAD with ectasia of the ascending measuring 3.9 cm is unchanged.  There was also moderate to large sized hiatal hernia breast changes were noted suspicious for her surgery and postop changes.  Hepatic cyst was noted.  CT scan in 2020 showed a sending aorta measuring 4.3 cm which was increased from prior study.,  Left lower lobe scarring was noted.  Trivial pericardial effusion is present.  Hiatal hernia is present.  CV surgery consult was recommended.  Avoidance of repetitive heavy weightlifting was also recommended.  The patient was seen in CV surgery clinic and repeat CT imaging in 1 year without contrast was recommended prior to office visit (2021)    Patient had an episode of dizziness with slight bradycardia in February and a monitor was performed that was relatively benign.  She is active and denies any chest pain palpitations syncope near syncope.  Her chronic dyspnea on exertion is unchanged.  Overall she feels well.  Blood pressure at home has been as it is  today.    Past Medical History:   Diagnosis Date   • Abnormal electrocardiogram    • Arthritis 2016   • Arthritis of neck    • Breast cancer, right (CMS/HCC) 2016    arimidex one year Tamoxifen one year   • Chronic pain syndrome 2016   • CKD (chronic kidney disease) stage 3, GFR 30-59 ml/min    • Colon polyp    • Coronary artery disease involving native coronary artery of native heart without angina pectoris    • Depression    • Drug-induced Cushing's syndrome (CMS/HCC) 2018   • Edema    • Essential hypertension 2016   • Hematuria 2017   • Hx of radiation therapy    • Hyperlipidemia 2016   • Hypothyroidism (acquired) 2016   • Lumbosacral disc disease    • Renal failure    • Scoliosis    • Thoracic aortic aneurysm (CMS/HCC)    • Thyroid disease      Past Surgical History:   Procedure Laterality Date   • BREAST LUMPECTOMY Right 2014   •  SECTION     • EXTRACORPOREAL SHOCK WAVE LITHOTRIPSY (ESWL)     • HYSTERECTOMY     • LUMBAR FUSION  2012    L3,4,5     Outpatient Medications Prior to Visit   Medication Sig Dispense Refill   • amitriptyline (ELAVIL) 25 MG tablet Take 1 tablet by mouth Every Evening. 90 tablet 3   • amLODIPine (NORVASC) 2.5 MG tablet Take 1 tablet by mouth Daily. 90 tablet 1   • aspirin 325 MG EC tablet Take 325 mg by mouth Daily.     • atorvastatin (Lipitor) 40 MG tablet Take 1 tablet by mouth Every Night. 90 tablet 3   • Calcium Carbonate (CALTRATE 600) 1500 (600 CA) MG tablet Take 600 mg by mouth 2 (Two) Times a Day With Meals.     • cetirizine (zyrTEC) 10 MG tablet Take 10 mg by mouth As Needed for Allergies.     • Coenzyme Q10 (COQ10) 200 MG capsule Take 1 capsule by mouth Daily.     • DULoxetine (CYMBALTA) 60 MG capsule Take 1 capsule by mouth Daily. 90 capsule 3   • HYDROcodone-acetaminophen (NORCO) 7.5-325 MG per tablet Take 1 tablet by mouth Every 6 (Six) Hours As Needed.     • levothyroxine (SYNTHROID, LEVOTHROID) 125 MCG  "tablet Take 1 tablet by mouth Daily. 90 tablet 3   • metoprolol tartrate (LOPRESSOR) 50 MG tablet Take 1.5 tablets by mouth 2 (Two) Times a Day. 270 tablet 3   • Multiple Vitamin (MULTI VITAMIN) tablet Take  by mouth.     • omeprazole (PriLOSEC) 20 MG capsule Take 1 capsule by mouth as needed (reflux). 90 capsule 2   • polyethylene glycol (MIRALAX) packet Take 8.5 g by mouth As Needed.       No facility-administered medications prior to visit.        Allergies as of 12/16/2020   • (No Known Allergies)     Social History     Socioeconomic History   • Marital status:      Spouse name: Not on file   • Number of children: Not on file   • Years of education: Not on file   • Highest education level: Not on file   Tobacco Use   • Smoking status: Never Smoker   • Smokeless tobacco: Never Used   Substance and Sexual Activity   • Alcohol use: No     Comment: caffeine use   • Drug use: No   • Sexual activity: Not Currently     Family History   Problem Relation Age of Onset   • Heart disease Mother    • Hyperlipidemia Mother    • Diabetes Mother    • Hypertension Mother    • Heart disease Father         Coronary artery disease   • Hyperlipidemia Father    • Hypertension Father    • Heart disease Sister    • Hypertension Other      Review of Systems   Cardiovascular: Positive for dyspnea on exertion. Negative for chest pain, leg swelling, near-syncope, palpitations and syncope.   Musculoskeletal: Positive for joint pain.        Objective:     Vitals:    12/16/20 1315   BP: 118/74   Pulse: 51   Weight: 67.1 kg (148 lb)   Height: 149.9 cm (59\")     Body mass index is 29.89 kg/m².    Physical Exam  Lab Review: not performed as this is a telehealth visit    Assessment:       Diagnosis Plan   1. Thoracic aortic ectasia (CMS/HCC)     2. Essential hypertension     3. Coronary artery disease involving native coronary artery of native heart without angina pectoris     4. Stage 3 chronic kidney disease, unspecified whether stage 3a " or 3b CKD       Plan:       1.   Hypertension.  Controlled and will continue current regimen follow-up in 1 year  2.   Asending aortic aneurysm, she is asymptomatic.  She is to see CV surgery in July 2021  3.   Renal insufficiency.  Followed by Dr. Sheth and felt to be stable  4.   Coronary artery disease noted by CT imaging negative stress test 2014.  No anginal symptoms at this point.  5.   Breast cancer.  Had radiation to right breast  6.   Abdominal pain.  7.   Probable hepatic cyst.  8.   Dyslipidemia.    This patient has consented to a telehealth visit via phone. The visit was scheduled as a telehealth phonevisit to comply with patient safety concerns in accordance with [facility] recommendations.  All vitals recorded within this visit are reported by the patient.  I spent  20minutes in total including but not limited to the 10 minutes spent in direct conversation with this patient.        Your medication list          Accurate as of December 16, 2020 11:59 PM. If you have any questions, ask your nurse or doctor.            CONTINUE taking these medications      Instructions Last Dose Given Next Dose Due   amitriptyline 25 MG tablet  Commonly known as: ELAVIL      Take 1 tablet by mouth Every Evening.       amLODIPine 2.5 MG tablet  Commonly known as: NORVASC      Take 1 tablet by mouth Daily.       aspirin  MG tablet      Take 325 mg by mouth Daily.       atorvastatin 40 MG tablet  Commonly known as: Lipitor      Take 1 tablet by mouth Every Night.       Caltrate 600 1500 (600 Ca) MG tablet  Generic drug: Calcium Carbonate      Take 600 mg by mouth 2 (Two) Times a Day With Meals.       cetirizine 10 MG tablet  Commonly known as: zyrTEC      Take 10 mg by mouth As Needed for Allergies.       CoQ10 200 MG capsule      Take 1 capsule by mouth Daily.       DULoxetine 60 MG capsule  Commonly known as: CYMBALTA      Take 1 capsule by mouth Daily.       HYDROcodone-acetaminophen 7.5-325 MG per tablet  Commonly known  as: NORCO      Take 1 tablet by mouth Every 6 (Six) Hours As Needed.       levothyroxine 125 MCG tablet  Commonly known as: SYNTHROID, LEVOTHROID      Take 1 tablet by mouth Daily.       metoprolol tartrate 50 MG tablet  Commonly known as: LOPRESSOR      Take 1.5 tablets by mouth 2 (Two) Times a Day.       multivitamin tablet tablet  Commonly known as: THERAGRAN      Take  by mouth.       omeprazole 20 MG capsule  Commonly known as: priLOSEC      Take 1 capsule by mouth as needed (reflux).       polyethylene glycol packet  Commonly known as: MIRALAX      Take 8.5 g by mouth As Needed.              Patient is no longer taking -.  I corrected the med list to reflect this.  I did not stop these medications.    Dictated utilizing Dragon dictation

## 2021-01-15 RX ORDER — HYDROCHLOROTHIAZIDE 25 MG/1
TABLET ORAL
Qty: 90 TABLET | Refills: 3 | OUTPATIENT
Start: 2021-01-15

## 2021-01-19 DIAGNOSIS — I10 ESSENTIAL HYPERTENSION: ICD-10-CM

## 2021-01-19 RX ORDER — METOPROLOL TARTRATE 50 MG/1
TABLET, FILM COATED ORAL
Qty: 270 TABLET | Refills: 3 | Status: SHIPPED | OUTPATIENT
Start: 2021-01-19 | End: 2022-02-07 | Stop reason: SDUPTHER

## 2021-01-19 NOTE — TELEPHONE ENCOUNTER
Last seen by Dr Squires: 12/16/2020  Next appt with Dr Squires: No future appt.  (due Dec 2021)    Chelle- Please schedule.

## 2021-02-05 ENCOUNTER — OFFICE VISIT (OUTPATIENT)
Dept: FAMILY MEDICINE CLINIC | Facility: CLINIC | Age: 76
End: 2021-02-05

## 2021-02-05 VITALS
OXYGEN SATURATION: 96 % | HEIGHT: 59 IN | WEIGHT: 154.2 LBS | BODY MASS INDEX: 31.08 KG/M2 | TEMPERATURE: 96.9 F | HEART RATE: 67 BPM | SYSTOLIC BLOOD PRESSURE: 140 MMHG | DIASTOLIC BLOOD PRESSURE: 82 MMHG

## 2021-02-05 DIAGNOSIS — E03.9 ACQUIRED HYPOTHYROIDISM: Chronic | ICD-10-CM

## 2021-02-05 DIAGNOSIS — I10 ESSENTIAL HYPERTENSION: Primary | Chronic | ICD-10-CM

## 2021-02-05 DIAGNOSIS — E78.2 MIXED HYPERLIPIDEMIA: Chronic | ICD-10-CM

## 2021-02-05 DIAGNOSIS — I25.10 CORONARY ARTERY DISEASE INVOLVING NATIVE CORONARY ARTERY OF NATIVE HEART WITHOUT ANGINA PECTORIS: Chronic | ICD-10-CM

## 2021-02-05 DIAGNOSIS — I10 ESSENTIAL HYPERTENSION: ICD-10-CM

## 2021-02-05 PROCEDURE — 99214 OFFICE O/P EST MOD 30 MIN: CPT | Performed by: FAMILY MEDICINE

## 2021-02-05 RX ORDER — AMLODIPINE BESYLATE 2.5 MG/1
TABLET ORAL
Qty: 90 TABLET | Refills: 0 | Status: SHIPPED | OUTPATIENT
Start: 2021-02-05 | End: 2021-05-06

## 2021-02-05 RX ORDER — ROSUVASTATIN CALCIUM 5 MG/1
5 TABLET, COATED ORAL NIGHTLY
Qty: 90 TABLET | Refills: 3 | Status: SHIPPED | OUTPATIENT
Start: 2021-02-05 | End: 2021-08-06 | Stop reason: SDUPTHER

## 2021-02-05 RX ORDER — HYDROCHLOROTHIAZIDE 25 MG/1
TABLET ORAL
COMMUNITY
Start: 2021-01-15 | End: 2021-02-05

## 2021-02-05 NOTE — PROGRESS NOTES
"Chief Complaint  Hypertension and Hyperlipidemia    Subjective          Bird Spear presents to [facility] for   Patient presents for her blood pressure dyslipidemia follow-up.  She is also due for thyroid check.  She has been compliant with her medications.  She is wondering why her previous physician stopped her hydrochlorothiazide.  We a lengthy discussion about how this was because of her chronic kidney disease.      BP at home /62-74, HR 51-62      Objective   Vital Signs:   /82   Pulse 67   Temp 96.9 °F (36.1 °C)   Ht 149.9 cm (59\")   Wt 69.9 kg (154 lb 3.2 oz)   SpO2 96%   BMI 31.14 kg/m²     Physical Exam  Constitutional:       General: She is not in acute distress.     Appearance: Normal appearance. She is well-developed.   HENT:      Head: Normocephalic and atraumatic.      Right Ear: Tympanic membrane, ear canal and external ear normal.      Left Ear: Tympanic membrane, ear canal and external ear normal.      Mouth/Throat:      Mouth: Mucous membranes are moist.      Pharynx: Oropharynx is clear.   Eyes:      Conjunctiva/sclera: Conjunctivae normal.      Pupils: Pupils are equal, round, and reactive to light.   Neck:      Musculoskeletal: Neck supple.      Thyroid: No thyromegaly.   Cardiovascular:      Rate and Rhythm: Normal rate and regular rhythm.      Heart sounds: No murmur.   Pulmonary:      Effort: Pulmonary effort is normal.      Breath sounds: Normal breath sounds. No wheezing.   Abdominal:      General: Bowel sounds are normal.      Palpations: Abdomen is soft.      Tenderness: There is no abdominal tenderness.   Musculoskeletal: Normal range of motion.   Lymphadenopathy:      Cervical: No cervical adenopathy.   Skin:     General: Skin is warm and dry.   Neurological:      Mental Status: She is alert and oriented to person, place, and time.   Psychiatric:         Mood and Affect: Mood normal.         Behavior: Behavior normal.        Result " Review :                 Assessment and Plan    Problem List Items Addressed This Visit        Cardiac and Vasculature    Essential hypertension - Primary    Relevant Orders    Comprehensive Metabolic Panel    Lipid Panel    CBC & Differential    Hyperlipidemia    Relevant Medications    rosuvastatin (Crestor) 5 MG tablet    Other Relevant Orders    Comprehensive Metabolic Panel    Lipid Panel    Coronary artery disease involving native coronary artery of native heart without angina pectoris       Endocrine and Metabolic    Hypothyroidism    Relevant Orders    TSH          Follow Up   Return in about 6 months (around 8/5/2021) for Medicare Wellness.  Patient was given instructions and counseling regarding her condition or for health maintenance advice. Please see specific information pulled into the AVS if appropriate.

## 2021-02-06 LAB
ALBUMIN SERPL-MCNC: 4.5 G/DL (ref 3.7–4.7)
ALBUMIN/GLOB SERPL: 1.8 {RATIO} (ref 1.2–2.2)
ALP SERPL-CCNC: 86 IU/L (ref 39–117)
ALT SERPL-CCNC: 17 IU/L (ref 0–32)
AST SERPL-CCNC: 26 IU/L (ref 0–40)
BASOPHILS # BLD AUTO: 0 X10E3/UL (ref 0–0.2)
BASOPHILS NFR BLD AUTO: 1 %
BILIRUB SERPL-MCNC: 0.4 MG/DL (ref 0–1.2)
BUN SERPL-MCNC: 22 MG/DL (ref 8–27)
BUN/CREAT SERPL: 23 (ref 12–28)
CALCIUM SERPL-MCNC: 9.5 MG/DL (ref 8.7–10.3)
CHLORIDE SERPL-SCNC: 102 MMOL/L (ref 96–106)
CHOLEST SERPL-MCNC: 158 MG/DL (ref 100–199)
CO2 SERPL-SCNC: 24 MMOL/L (ref 20–29)
CREAT SERPL-MCNC: 0.96 MG/DL (ref 0.57–1)
EOSINOPHIL # BLD AUTO: 0.1 X10E3/UL (ref 0–0.4)
EOSINOPHIL NFR BLD AUTO: 3 %
ERYTHROCYTE [DISTWIDTH] IN BLOOD BY AUTOMATED COUNT: 13.5 % (ref 11.7–15.4)
GLOBULIN SER CALC-MCNC: 2.5 G/DL (ref 1.5–4.5)
GLUCOSE SERPL-MCNC: 85 MG/DL (ref 65–99)
HCT VFR BLD AUTO: 39 % (ref 34–46.6)
HDLC SERPL-MCNC: 71 MG/DL
HGB BLD-MCNC: 12.7 G/DL (ref 11.1–15.9)
IMM GRANULOCYTES # BLD AUTO: 0 X10E3/UL (ref 0–0.1)
IMM GRANULOCYTES NFR BLD AUTO: 0 %
LDLC SERPL CALC-MCNC: 74 MG/DL (ref 0–99)
LYMPHOCYTES # BLD AUTO: 1 X10E3/UL (ref 0.7–3.1)
LYMPHOCYTES NFR BLD AUTO: 25 %
MCH RBC QN AUTO: 30.2 PG (ref 26.6–33)
MCHC RBC AUTO-ENTMCNC: 32.6 G/DL (ref 31.5–35.7)
MCV RBC AUTO: 93 FL (ref 79–97)
MONOCYTES # BLD AUTO: 0.5 X10E3/UL (ref 0.1–0.9)
MONOCYTES NFR BLD AUTO: 12 %
NEUTROPHILS # BLD AUTO: 2.3 X10E3/UL (ref 1.4–7)
NEUTROPHILS NFR BLD AUTO: 59 %
PLATELET # BLD AUTO: 256 X10E3/UL (ref 150–450)
POTASSIUM SERPL-SCNC: 4.3 MMOL/L (ref 3.5–5.2)
PROT SERPL-MCNC: 7 G/DL (ref 6–8.5)
RBC # BLD AUTO: 4.2 X10E6/UL (ref 3.77–5.28)
SODIUM SERPL-SCNC: 142 MMOL/L (ref 134–144)
TRIGL SERPL-MCNC: 67 MG/DL (ref 0–149)
TSH SERPL DL<=0.005 MIU/L-ACNC: 1.22 UIU/ML (ref 0.45–4.5)
VLDLC SERPL CALC-MCNC: 13 MG/DL (ref 5–40)
WBC # BLD AUTO: 3.9 X10E3/UL (ref 3.4–10.8)

## 2021-03-09 DIAGNOSIS — Z23 IMMUNIZATION DUE: ICD-10-CM

## 2021-05-06 DIAGNOSIS — I10 ESSENTIAL HYPERTENSION: ICD-10-CM

## 2021-05-06 RX ORDER — AMLODIPINE BESYLATE 2.5 MG/1
TABLET ORAL
Qty: 90 TABLET | Refills: 3 | Status: SHIPPED | OUTPATIENT
Start: 2021-05-06 | End: 2021-08-06 | Stop reason: SDUPTHER

## 2021-08-06 ENCOUNTER — OFFICE VISIT (OUTPATIENT)
Dept: FAMILY MEDICINE CLINIC | Facility: CLINIC | Age: 76
End: 2021-08-06

## 2021-08-06 ENCOUNTER — TELEPHONE (OUTPATIENT)
Dept: FAMILY MEDICINE CLINIC | Facility: CLINIC | Age: 76
End: 2021-08-06

## 2021-08-06 VITALS
TEMPERATURE: 98.7 F | HEART RATE: 80 BPM | OXYGEN SATURATION: 99 % | HEIGHT: 59 IN | DIASTOLIC BLOOD PRESSURE: 80 MMHG | WEIGHT: 150.4 LBS | SYSTOLIC BLOOD PRESSURE: 144 MMHG | BODY MASS INDEX: 30.32 KG/M2

## 2021-08-06 DIAGNOSIS — Z78.0 POST-MENOPAUSE: ICD-10-CM

## 2021-08-06 DIAGNOSIS — Z12.31 VISIT FOR SCREENING MAMMOGRAM: ICD-10-CM

## 2021-08-06 DIAGNOSIS — E03.9 ACQUIRED HYPOTHYROIDISM: ICD-10-CM

## 2021-08-06 DIAGNOSIS — I10 ESSENTIAL HYPERTENSION: Primary | ICD-10-CM

## 2021-08-06 DIAGNOSIS — F32.9 REACTIVE DEPRESSION: ICD-10-CM

## 2021-08-06 DIAGNOSIS — I25.10 CORONARY ARTERY DISEASE INVOLVING NATIVE CORONARY ARTERY OF NATIVE HEART WITHOUT ANGINA PECTORIS: ICD-10-CM

## 2021-08-06 DIAGNOSIS — G89.29 OTHER CHRONIC PAIN: ICD-10-CM

## 2021-08-06 DIAGNOSIS — E78.2 MIXED HYPERLIPIDEMIA: ICD-10-CM

## 2021-08-06 PROCEDURE — G0439 PPPS, SUBSEQ VISIT: HCPCS | Performed by: FAMILY MEDICINE

## 2021-08-06 RX ORDER — ROSUVASTATIN CALCIUM 5 MG/1
5 TABLET, COATED ORAL NIGHTLY
Qty: 90 TABLET | Refills: 3 | Status: SHIPPED | OUTPATIENT
Start: 2021-08-06 | End: 2022-08-09 | Stop reason: SDUPTHER

## 2021-08-06 RX ORDER — DULOXETIN HYDROCHLORIDE 60 MG/1
60 CAPSULE, DELAYED RELEASE ORAL DAILY
Qty: 90 CAPSULE | Refills: 3 | Status: SHIPPED | OUTPATIENT
Start: 2021-08-06 | End: 2022-08-01

## 2021-08-06 RX ORDER — AMITRIPTYLINE HYDROCHLORIDE 25 MG/1
25 TABLET, FILM COATED ORAL EVERY EVENING
Qty: 90 TABLET | Refills: 3 | Status: SHIPPED | OUTPATIENT
Start: 2021-08-06 | End: 2022-08-09 | Stop reason: SDUPTHER

## 2021-08-06 RX ORDER — AMLODIPINE BESYLATE 2.5 MG/1
2.5 TABLET ORAL DAILY
Qty: 90 TABLET | Refills: 3 | Status: SHIPPED | OUTPATIENT
Start: 2021-08-06 | End: 2022-05-03

## 2021-08-06 RX ORDER — DULOXETIN HYDROCHLORIDE 60 MG/1
60 CAPSULE, DELAYED RELEASE ORAL DAILY
Qty: 90 CAPSULE | Refills: 3 | Status: SHIPPED | OUTPATIENT
Start: 2021-08-06 | End: 2021-08-06 | Stop reason: SDUPTHER

## 2021-08-06 RX ORDER — AMITRIPTYLINE HYDROCHLORIDE 25 MG/1
25 TABLET, FILM COATED ORAL EVERY EVENING
Qty: 90 TABLET | Refills: 3 | Status: SHIPPED | OUTPATIENT
Start: 2021-08-06 | End: 2021-08-06 | Stop reason: SDUPTHER

## 2021-08-06 RX ORDER — ROSUVASTATIN CALCIUM 5 MG/1
5 TABLET, COATED ORAL NIGHTLY
Qty: 90 TABLET | Refills: 3 | Status: SHIPPED | OUTPATIENT
Start: 2021-08-06 | End: 2021-08-06 | Stop reason: SDUPTHER

## 2021-08-06 RX ORDER — LEVOTHYROXINE SODIUM 0.12 MG/1
125 TABLET ORAL DAILY
Qty: 90 TABLET | Refills: 3 | Status: SHIPPED | OUTPATIENT
Start: 2021-08-06 | End: 2022-02-08 | Stop reason: DRUGHIGH

## 2021-08-06 RX ORDER — LEVOTHYROXINE SODIUM 0.12 MG/1
125 TABLET ORAL DAILY
Qty: 90 TABLET | Refills: 3 | Status: SHIPPED | OUTPATIENT
Start: 2021-08-06 | End: 2021-08-06 | Stop reason: SDUPTHER

## 2021-08-06 NOTE — PROGRESS NOTES
The ABCs of the Annual Wellness Visit  Subsequent Medicare Wellness Visit    Chief Complaint   Patient presents with   • Medicare Wellness-subsequent       Subjective   History of Present Illness:  Bird Spear is a 75 y.o. female who presents for a Subsequent Medicare Wellness Visit. Complaint with meds. Up to date with Dr. Madison. BP high at times. Mostly 110s-120s at home. Complaint with medicaitons.Fasting for her blood work.     HEALTH RISK ASSESSMENT    Recent Hospitalizations:  No hospitalization(s) within the last year.    Current Medical Providers:  Patient Care Team:  Kehrer, Meredith Lea, MD as PCP - General (Family Medicine)  Armond Kilpatrick MD as Consulting Physician (Orthopedic Surgery)  Giulia Squires MD as Consulting Physician (Cardiology)  Fortino Sánchez MD as Consulting Physician (Medical Oncology)  Kb Brooks MD as Consulting Physician (Gastroenterology)  Sandro Madison MD as Consulting Physician (Pain Medicine)    Smoking Status:  Social History     Tobacco Use   Smoking Status Never Smoker   Smokeless Tobacco Never Used       Alcohol Consumption:  Social History     Substance and Sexual Activity   Alcohol Use No    Comment: caffeine use       Depression Screen:   PHQ-2/PHQ-9 Depression Screening 8/6/2021   Little interest or pleasure in doing things 0   Feeling down, depressed, or hopeless 0   Trouble falling or staying asleep, or sleeping too much -   Feeling tired or having little energy -   Poor appetite or overeating -   Feeling bad about yourself - or that you are a failure or have let yourself or your family down -   Trouble concentrating on things, such as reading the newspaper or watching television -   Moving or speaking so slowly that other people could have noticed. Or the opposite - being so fidgety or restless that you have been moving around a lot more than usual -   Thoughts that you would be better off dead, or of hurting yourself in some way -    Total Score 0   If you checked off any problems, how difficult have these problems made it for you to do your work, take care of things at home, or get along with other people? -       Fall Risk Screen:  JOLENE Fall Risk Assessment was completed, and patient is at LOW risk for falls.Assessment completed on:8/6/2021    Health Habits and Functional and Cognitive Screening:  Functional & Cognitive Status 8/6/2021   Do you have difficulty preparing food and eating? No   Do you have difficulty bathing yourself, getting dressed or grooming yourself? No   Do you have difficulty using the toilet? No   Do you have difficulty moving around from place to place? No   Do you have trouble with steps or getting out of a bed or a chair? No   Current Diet Well Balanced Diet   Dental Exam Up to date   Eye Exam Up to date   Exercise (times per week) 2 times per week   Current Exercises Include Walking   Current Exercise Activities Include -   Do you need help using the phone?  No   Are you deaf or do you have serious difficulty hearing?  No   Do you need help with transportation? No   Do you need help shopping? No   Do you need help preparing meals?  No   Do you need help with housework?  No   Do you need help with laundry? No   Do you need help taking your medications? No   Do you need help managing money? No   Do you ever drive or ride in a car without wearing a seat belt? -   Have you felt unusual stress, anger or loneliness in the last month? -   Who do you live with? -   If you need help, do you have trouble finding someone available to you? -   Have you been bothered in the last four weeks by sexual problems? -   Do you have difficulty concentrating, remembering or making decisions? -         Does the patient have evidence of cognitive impairment? No    Asprin use counseling:Taking ASA appropriately as indicated    Age-appropriate Screening Schedule:  Refer to the list below for future screening recommendations based on  patient's age, sex and/or medical conditions. Orders for these recommended tests are listed in the plan section. The patient has been provided with a written plan.    Health Maintenance   Topic Date Due   • DXA SCAN  03/27/2020   • TDAP/TD VACCINES (2 - Td or Tdap) 04/05/2021   • INFLUENZA VACCINE  10/01/2021   • MAMMOGRAM  10/29/2021   • LIPID PANEL  02/05/2022   • ZOSTER VACCINE  Completed          The following portions of the patient's history were reviewed and updated as appropriate: allergies, current medications, past family history, past medical history, past social history, past surgical history and problem list.    Outpatient Medications Prior to Visit   Medication Sig Dispense Refill   • amLODIPine (NORVASC) 2.5 MG tablet TAKE 1 TABLET DAILY 90 tablet 3   • aspirin 325 MG EC tablet Take 325 mg by mouth Daily.     • Calcium Carbonate (CALTRATE 600) 1500 (600 CA) MG tablet Take 600 mg by mouth 2 (Two) Times a Day With Meals.     • cetirizine (zyrTEC) 10 MG tablet Take 10 mg by mouth As Needed for Allergies.     • Coenzyme Q10 (COQ10) 200 MG capsule Take 1 capsule by mouth Daily.     • HYDROcodone-acetaminophen (NORCO) 7.5-325 MG per tablet Take 1 tablet by mouth Every 6 (Six) Hours As Needed.     • metoprolol tartrate (LOPRESSOR) 50 MG tablet TAKE ONE AND ONE-HALF TABLETS TWICE A  tablet 3   • Multiple Vitamin (MULTI VITAMIN) tablet Take  by mouth.     • omeprazole (PriLOSEC) 20 MG capsule Take 1 capsule by mouth as needed (reflux). 90 capsule 2   • polyethylene glycol (MIRALAX) packet Take 8.5 g by mouth As Needed.     • amitriptyline (ELAVIL) 25 MG tablet Take 1 tablet by mouth Every Evening. 90 tablet 3   • DULoxetine (CYMBALTA) 60 MG capsule Take 1 capsule by mouth Daily. 90 capsule 3   • levothyroxine (SYNTHROID, LEVOTHROID) 125 MCG tablet Take 1 tablet by mouth Daily. 90 tablet 3   • rosuvastatin (Crestor) 5 MG tablet Take 1 tablet by mouth Every Night. 90 tablet 3     No facility-administered  medications prior to visit.       Patient Active Problem List   Diagnosis   • Chronic pain syndrome   • Arthritis   • Essential hypertension   • Hypothyroidism   • Hyperlipidemia   • Reflux esophagitis   • Abnormal electrocardiogram   • Degenerative spondylolisthesis   • Spinal stenosis of lumbar region   • Thoracic aortic ectasia (CMS/HCC)   • Greater trochanteric bursitis of both hips   • Tendinopathy of rotator cuff, right   • Acromioclavicular joint arthritis   • CKD (chronic kidney disease) stage 3, GFR 30-59 ml/min (CMS/HCC)   • Hiatal hernia   • Coronary artery disease involving native coronary artery of native heart without angina pectoris   • Reactive depression   • Bradycardia   • Renal atrophy, bilateral       Advanced Care Planning:  ACP discussion was held with the patient during this visit. Patient has an advance directive in EMR which is still valid.     Review of Systems   Constitutional: Negative for chills, fatigue and fever.   HENT: Negative for congestion, ear pain, rhinorrhea and sore throat.    Eyes: Negative for pain and redness.   Respiratory: Positive for shortness of breath. Negative for cough, chest tightness and wheezing.         Some SOA in am from PND   Cardiovascular: Negative for chest pain and leg swelling.   Gastrointestinal: Negative for abdominal pain, constipation, diarrhea, nausea and vomiting.   Endocrine: Negative for polydipsia and polyphagia.   Genitourinary: Negative for dysuria and hematuria.   Musculoskeletal: Positive for arthralgias. Negative for myalgias.   Skin: Negative for color change and rash.   Allergic/Immunologic: Negative.    Neurological: Negative for dizziness, weakness, light-headedness and headaches.   Hematological: Negative.    Psychiatric/Behavioral: Negative for confusion, dysphoric mood and sleep disturbance.       Compared to one year ago, the patient feels her physical health is the same.  Compared to one year ago, the patient feels her mental  "health is the same.    Reviewed chart for potential of high risk medication in the elderly: yes  Reviewed chart for potential of harmful drug interactions in the elderly:yes    Objective         Vitals:    08/06/21 1259   BP: 144/80   Pulse: 80   Temp: 98.7 °F (37.1 °C)   SpO2: 99%   Weight: 68.2 kg (150 lb 6.4 oz)   Height: 149.9 cm (59\")       Body mass index is 30.38 kg/m².  Discussed the patient's BMI with her. The BMI is above average; BMI management plan is completed.    Physical Exam  Vitals and nursing note reviewed.   Constitutional:       General: She is not in acute distress.     Appearance: Normal appearance. She is well-developed.   HENT:      Head: Normocephalic and atraumatic.      Right Ear: Tympanic membrane, ear canal and external ear normal.      Left Ear: Tympanic membrane, ear canal and external ear normal.      Nose: Nose normal.      Mouth/Throat:      Mouth: Mucous membranes are moist.      Pharynx: Oropharynx is clear. No oropharyngeal exudate or posterior oropharyngeal erythema.   Eyes:      Conjunctiva/sclera: Conjunctivae normal.      Pupils: Pupils are equal, round, and reactive to light.   Neck:      Thyroid: No thyromegaly.   Cardiovascular:      Rate and Rhythm: Normal rate and regular rhythm.      Heart sounds: No murmur heard.     Pulmonary:      Effort: Pulmonary effort is normal.      Breath sounds: Normal breath sounds. No wheezing.   Abdominal:      General: Abdomen is flat. Bowel sounds are normal. There is no distension.      Palpations: Abdomen is soft. There is no mass.      Tenderness: There is no abdominal tenderness.      Hernia: No hernia is present.   Musculoskeletal:         General: No swelling. Normal range of motion.      Cervical back: Normal range of motion and neck supple.      Right lower leg: No edema.      Left lower leg: No edema.   Lymphadenopathy:      Cervical: No cervical adenopathy.   Skin:     General: Skin is warm and dry.      Capillary Refill: " Capillary refill takes less than 2 seconds.      Findings: No rash.   Neurological:      General: No focal deficit present.      Mental Status: She is alert and oriented to person, place, and time.      Cranial Nerves: No cranial nerve deficit.   Psychiatric:         Mood and Affect: Mood normal.         Behavior: Behavior normal.               Assessment/Plan   Medicare Risks and Personalized Health Plan  CMS Preventative Services Quick Reference  Cardiovascular risk    The above risks/problems have been discussed with the patient.  Pertinent information has been shared with the patient in the After Visit Summary.  Follow up plans and orders are seen below in the Assessment/Plan Section.    Diagnoses and all orders for this visit:    1. Essential hypertension (Primary)  -     CBC & Differential  -     Comprehensive Metabolic Panel  -     TSH    2. Mixed hyperlipidemia  -     Comprehensive Metabolic Panel  -     Lipid Panel  -     rosuvastatin (Crestor) 5 MG tablet; Take 1 tablet by mouth Every Night.  Dispense: 90 tablet; Refill: 3    3. Acquired hypothyroidism  -     TSH  -     levothyroxine (SYNTHROID, LEVOTHROID) 125 MCG tablet; Take 1 tablet by mouth Daily.  Dispense: 90 tablet; Refill: 3    4. Coronary artery disease involving native coronary artery of native heart without angina pectoris    5. Other chronic pain    6. Visit for screening mammogram  -     Mammo Screening Digital Tomosynthesis Bilateral With CAD; Future    7. Post-menopause  -     DEXA Bone Density Axial; Future    8. Reactive depression  -     DULoxetine (CYMBALTA) 60 MG capsule; Take 1 capsule by mouth Daily.  Dispense: 90 capsule; Refill: 3  -     amitriptyline (ELAVIL) 25 MG tablet; Take 1 tablet by mouth Every Evening.  Dispense: 90 tablet; Refill: 3      Follow Up:  Return in about 6 months (around 2/6/2022) for Recheck BP, thyroid.     An After Visit Summary and PPPS were given to the patient.

## 2021-08-06 NOTE — TELEPHONE ENCOUNTER
PATIENT STATES THAT ALL THE MEDICATIONS SENT IN TODAY WERE TO GO TO [facility], 90 DAY SUPPLY

## 2021-08-07 LAB
ALBUMIN SERPL-MCNC: 4.6 G/DL (ref 3.7–4.7)
ALBUMIN/GLOB SERPL: 1.8 {RATIO} (ref 1.2–2.2)
ALP SERPL-CCNC: 74 IU/L (ref 48–121)
ALT SERPL-CCNC: 17 IU/L (ref 0–32)
AST SERPL-CCNC: 26 IU/L (ref 0–40)
BASOPHILS # BLD AUTO: 0 X10E3/UL (ref 0–0.2)
BASOPHILS NFR BLD AUTO: 1 %
BILIRUB SERPL-MCNC: 0.6 MG/DL (ref 0–1.2)
BUN SERPL-MCNC: 19 MG/DL (ref 8–27)
BUN/CREAT SERPL: 19 (ref 12–28)
CALCIUM SERPL-MCNC: 9.9 MG/DL (ref 8.7–10.3)
CHLORIDE SERPL-SCNC: 101 MMOL/L (ref 96–106)
CHOLEST SERPL-MCNC: 196 MG/DL (ref 100–199)
CO2 SERPL-SCNC: 25 MMOL/L (ref 20–29)
CREAT SERPL-MCNC: 1 MG/DL (ref 0.57–1)
EOSINOPHIL # BLD AUTO: 0.1 X10E3/UL (ref 0–0.4)
EOSINOPHIL NFR BLD AUTO: 4 %
ERYTHROCYTE [DISTWIDTH] IN BLOOD BY AUTOMATED COUNT: 13.7 % (ref 11.7–15.4)
GLOBULIN SER CALC-MCNC: 2.5 G/DL (ref 1.5–4.5)
GLUCOSE SERPL-MCNC: 90 MG/DL (ref 65–99)
HCT VFR BLD AUTO: 40.1 % (ref 34–46.6)
HDLC SERPL-MCNC: 76 MG/DL
HGB BLD-MCNC: 13.2 G/DL (ref 11.1–15.9)
IMM GRANULOCYTES # BLD AUTO: 0 X10E3/UL (ref 0–0.1)
IMM GRANULOCYTES NFR BLD AUTO: 0 %
LDLC SERPL CALC-MCNC: 107 MG/DL (ref 0–99)
LYMPHOCYTES # BLD AUTO: 1.1 X10E3/UL (ref 0.7–3.1)
LYMPHOCYTES NFR BLD AUTO: 30 %
MCH RBC QN AUTO: 30.3 PG (ref 26.6–33)
MCHC RBC AUTO-ENTMCNC: 32.9 G/DL (ref 31.5–35.7)
MCV RBC AUTO: 92 FL (ref 79–97)
MONOCYTES # BLD AUTO: 0.5 X10E3/UL (ref 0.1–0.9)
MONOCYTES NFR BLD AUTO: 15 %
NEUTROPHILS # BLD AUTO: 1.8 X10E3/UL (ref 1.4–7)
NEUTROPHILS NFR BLD AUTO: 50 %
PLATELET # BLD AUTO: 270 X10E3/UL (ref 150–450)
POTASSIUM SERPL-SCNC: 5.1 MMOL/L (ref 3.5–5.2)
PROT SERPL-MCNC: 7.1 G/DL (ref 6–8.5)
RBC # BLD AUTO: 4.36 X10E6/UL (ref 3.77–5.28)
SODIUM SERPL-SCNC: 140 MMOL/L (ref 134–144)
TRIGL SERPL-MCNC: 74 MG/DL (ref 0–149)
TSH SERPL DL<=0.005 MIU/L-ACNC: 1.07 UIU/ML (ref 0.45–4.5)
VLDLC SERPL CALC-MCNC: 13 MG/DL (ref 5–40)
WBC # BLD AUTO: 3.5 X10E3/UL (ref 3.4–10.8)

## 2021-08-12 ENCOUNTER — HOSPITAL ENCOUNTER (OUTPATIENT)
Dept: CT IMAGING | Facility: HOSPITAL | Age: 76
Discharge: HOME | End: 2021-08-12
Admitting: NURSE PRACTITIONER

## 2021-08-12 DIAGNOSIS — I77.810 THORACIC AORTIC ECTASIA (HCC): ICD-10-CM

## 2021-08-12 PROCEDURE — 71250 CT THORAX DX C-: CPT

## 2021-09-22 ENCOUNTER — OFFICE VISIT (OUTPATIENT)
Dept: CARDIAC SURGERY | Facility: CLINIC | Age: 76
End: 2021-09-22

## 2021-09-22 VITALS
DIASTOLIC BLOOD PRESSURE: 63 MMHG | HEART RATE: 53 BPM | SYSTOLIC BLOOD PRESSURE: 125 MMHG | BODY MASS INDEX: 29.64 KG/M2 | WEIGHT: 147 LBS | HEIGHT: 59 IN | TEMPERATURE: 92.5 F

## 2021-09-22 DIAGNOSIS — I77.810 THORACIC AORTIC ECTASIA (HCC): Primary | ICD-10-CM

## 2021-09-22 PROCEDURE — 99212 OFFICE O/P EST SF 10 MIN: CPT | Performed by: NURSE PRACTITIONER

## 2021-09-22 NOTE — PROGRESS NOTES
9/22/2021      Subjective:      Kehrer, Meredith Lea, MD    Chief Complaint: Thoracic aortic dilatation    History of Present Illness:       Dear Dr. Kehrer, Meredith Lea, MD and Colleagues,    It was nice to see Bird Spear in follow up for ascending aortic dilatation.  She is a 76 y.o. female with a history of ascending aortic dilatation measuring at 3.9 cm in 2018 that was found originally incidentally in 2012.  She had interval growth in 2020 and was referred to our office at which time her ascending aorta maximally measured at 4.3 cm.  Previous echocardiogram in 2016 demonstrated a tricuspid aortic valve.  The CT of chest without contrast on 8/12/2021 was reviewed and the ascending aorta measures maximally at 4.3 cm, DTA 2.3 cm.  Incidental findings on her CT include scarring or atelectasis bilateral lower lobes, old granulomatous disease with small calcified left lower lobe nodules and left hilar calcified nodule, hepatic cysts, and hiatal hernia.  The patient states there have been no changes to her health history since I saw her last year.  She denies shortness of breath, chest/back pain, numbness/tingling/pain of extremities.  The patient's family history is negative for aneurysms or dissections, negative for connective tissue disease, and positive for coronary disease in first degree family members with her father having had an MI in his 50s, her sister expiring from CHF complications at age 80, and mother expiring from sudden death at age 52.       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  Lives with  of 57 years, is a retired , states she continues to be active with yard work and intermittent walking in her neighborhood 2 to 3 days a week    Patient Active Problem List   Diagnosis   • Chronic pain syndrome   • Arthritis   • Essential hypertension   • Hypothyroidism   • Hyperlipidemia   • Reflux esophagitis   • Abnormal electrocardiogram   • Degenerative spondylolisthesis   • Spinal stenosis of lumbar region   • Thoracic aortic ectasia (CMS/HCC)   • Greater trochanteric bursitis of both hips   • Tendinopathy of rotator cuff, right   • Acromioclavicular joint arthritis   • CKD (chronic kidney disease) stage 3, GFR 30-59 ml/min (CMS/HCC)   • Hiatal hernia   • Coronary artery disease involving native coronary artery of native heart without angina pectoris   • Reactive depression   • Bradycardia   • Renal atrophy, bilateral       Past Medical History:   Diagnosis Date   • Abnormal electrocardiogram    • Arthritis 2016   • Arthritis of neck    • Breast cancer, right (CMS/HCC) 2016    arimidex one year Tamoxifen one year   • Chronic pain syndrome 2016   • CKD (chronic kidney disease) stage 3, GFR 30-59 ml/min (CMS/HCC)    • Colon polyp    • Coronary artery disease involving native coronary artery of native heart without angina pectoris    • Depression    • Drug-induced Cushing's syndrome (CMS/[facility]) 2018   • Edema    • Essential hypertension 2016   • Hematuria 2017   • Hx of radiation therapy    • Hyperlipidemia 2016   • Hypothyroidism (acquired) 2016   • Lumbosacral disc disease    • Renal failure    • Scoliosis    • Thoracic aortic aneurysm (CMS/HCC)    • Thyroid disease        Past Surgical History:   Procedure Laterality Date   • BREAST LUMPECTOMY Right 2014   •  SECTION     • EXTRACORPOREAL SHOCK WAVE LITHOTRIPSY (ESWL)     • HYSTERECTOMY     • LUMBAR FUSION  2012    L3,4,5       No Known  Allergies      Current Outpatient Medications:   •  amitriptyline (ELAVIL) 25 MG tablet, Take 1 tablet by mouth Every Evening., Disp: 90 tablet, Rfl: 3  •  amLODIPine (NORVASC) 2.5 MG tablet, Take 1 tablet by mouth Daily., Disp: 90 tablet, Rfl: 3  •  aspirin 325 MG EC tablet, Take 325 mg by mouth Daily., Disp: , Rfl:   •  Calcium Carbonate (CALTRATE 600) 1500 (600 CA) MG tablet, Take 600 mg by mouth 2 (Two) Times a Day With Meals., Disp: , Rfl:   •  cetirizine (zyrTEC) 10 MG tablet, Take 10 mg by mouth As Needed for Allergies., Disp: , Rfl:   •  Coenzyme Q10 (COQ10) 200 MG capsule, Take 1 capsule by mouth Daily., Disp: , Rfl:   •  DULoxetine (CYMBALTA) 60 MG capsule, Take 1 capsule by mouth Daily., Disp: 90 capsule, Rfl: 3  •  HYDROcodone-acetaminophen (NORCO) 7.5-325 MG per tablet, Take 1 tablet by mouth Every 6 (Six) Hours As Needed., Disp: , Rfl:   •  levothyroxine (SYNTHROID, LEVOTHROID) 125 MCG tablet, Take 1 tablet by mouth Daily., Disp: 90 tablet, Rfl: 3  •  metoprolol tartrate (LOPRESSOR) 50 MG tablet, TAKE ONE AND ONE-HALF TABLETS TWICE A DAY, Disp: 270 tablet, Rfl: 3  •  Multiple Vitamin (MULTI VITAMIN) tablet, Take  by mouth., Disp: , Rfl:   •  rosuvastatin (Crestor) 5 MG tablet, Take 1 tablet by mouth Every Night., Disp: 90 tablet, Rfl: 3  •  omeprazole (PriLOSEC) 20 MG capsule, Take 1 capsule by mouth as needed (reflux)., Disp: 90 capsule, Rfl: 2  •  polyethylene glycol (MIRALAX) packet, Take 8.5 g by mouth As Needed., Disp: , Rfl:     Social History     Socioeconomic History   • Marital status:      Spouse name: Not on file   • Number of children: Not on file   • Years of education: Not on file   • Highest education level: Not on file   Tobacco Use   • Smoking status: Never Smoker   • Smokeless tobacco: Never Used   Vaping Use   • Vaping Use: Never used   Substance and Sexual Activity   • Alcohol use: No     Comment: caffeine use   • Drug use: No   • Sexual activity: Not Currently       Family  History   Problem Relation Age of Onset   • Heart disease Mother    • Hyperlipidemia Mother    • Diabetes Mother    • Hypertension Mother    • Heart disease Father         Coronary artery disease   • Hyperlipidemia Father    • Hypertension Father    • Heart disease Sister    • Hypertension Other        The following portions of the patient's history were reviewed and updated as appropriate: allergies, current medications, past family history, past medical history, past social history, past surgical history and problem list.    Review of Systems:  Review of Systems   Constitutional: Negative for activity change and fatigue.   Respiratory: Positive for shortness of breath (Occasionally in the morning but resolves after mucus clearance). Negative for apnea and chest tightness.    Cardiovascular: Negative for chest pain and palpitations.   Gastrointestinal: Negative for nausea and vomiting.   Skin: Negative for color change and pallor.   Neurological: Negative for dizziness, syncope, weakness, light-headedness and numbness.   All other systems reviewed and are negative.      Physical Exam:    Vital Signs:  Weight: 66.7 kg (147 lb)   Body mass index is 29.69 kg/m².  Temp: 92.5 °F (33.6 °C)   Heart Rate: 53   BP: 125/63     Pulmonary:      Effort: Pulmonary effort is normal.   Neurological:      Mental Status: Alert and oriented to person, place and time.          Assessment:     1.  Ascending aortic dilatation----stable at 4.3 cm, surgical threshold is lower with her diminutive stature  2.  Hypertension----well-controlled, follows with Dr. Squires  3.  Potential familial history of aneurysm with mother expiring from sudden death at age 52  4.  CKD II, history renal cortical infarct  5.  Hyperlipidemia----on statin, followed by primary  6.  Right breast cancer status post lumpectomy and radiation  7.  Chronic back pain----on chronic narcotics    Recommendation/Plan:     No surgical intervention warranted at this time with no  growth in 1 year and size of dilatation.  Repeat CT of the chest without contrast in 1 year, if continued stability will increase surveillance period to every 2 years    We discussed the importance of avoidance of over the counter decongestants and stimulants, specifically pseudoephedrine and caffeine, for hypertension and aneurysm management    Risk factor modification of hypertension with a goal blood pressure less than 130/80, hyperlipidemia optimization, and continued avoidance of tobacco/nicotine products.    Continuation of low aerobic exercise is indicated to help reduce body habitus, assist with blood pressure and cholesterol control.       Although risk of rupture is low, emergency department presentation is warranted for acute chest, back, or abdominal pain, syncope, or stroke like symptoms    Thank you for allowing us to participate in her care.    Regards,    DARINEL Cali    This patient has consented to a telehealth visit via telephone due to the Coronavirus pandemic. The visit was scheduled as a telephone visit to comply with patient safety concerns in accordance with CDC recommendations.  All vitals recorded within this visit are reported by the patient.  I spent 20 minutes in total including but not limited to the 15 minutes spent in direct conversation with this patient.

## 2021-10-08 ENCOUNTER — TELEPHONE (OUTPATIENT)
Dept: FAMILY MEDICINE CLINIC | Facility: CLINIC | Age: 76
End: 2021-10-08

## 2021-10-08 NOTE — TELEPHONE ENCOUNTER
PATIENT STATES SHE WAS REFERRED TO HAVE MAMMOGRAM AND BONE DENSITY TEST   PATIENT STATES SHE IS HAVING MAMMOGRAM DONE AT [facility]    SHE STATES SHE IS HAVING BONE DENSITY AT [facility]     PATIENT STATES SHE NEEDS ORDERS SENT   CALLBACK [phone]

## 2021-10-26 ENCOUNTER — TELEPHONE (OUTPATIENT)
Dept: FAMILY MEDICINE CLINIC | Facility: CLINIC | Age: 76
End: 2021-10-26

## 2021-11-01 ENCOUNTER — APPOINTMENT (OUTPATIENT)
Dept: WOMENS IMAGING | Facility: HOSPITAL | Age: 76
End: 2021-11-01

## 2021-11-01 PROCEDURE — 77067 SCR MAMMO BI INCL CAD: CPT | Performed by: RADIOLOGY

## 2021-11-01 PROCEDURE — 77063 BREAST TOMOSYNTHESIS BI: CPT | Performed by: RADIOLOGY

## 2021-11-01 PROCEDURE — 77080 DXA BONE DENSITY AXIAL: CPT | Performed by: RADIOLOGY

## 2021-11-03 DIAGNOSIS — Z12.31 VISIT FOR SCREENING MAMMOGRAM: ICD-10-CM

## 2021-11-19 DIAGNOSIS — Z78.0 POST-MENOPAUSE: ICD-10-CM

## 2021-12-15 ENCOUNTER — OFFICE VISIT (OUTPATIENT)
Dept: CARDIOLOGY | Facility: CLINIC | Age: 76
End: 2021-12-15

## 2021-12-15 VITALS
DIASTOLIC BLOOD PRESSURE: 68 MMHG | HEART RATE: 55 BPM | WEIGHT: 151 LBS | SYSTOLIC BLOOD PRESSURE: 120 MMHG | BODY MASS INDEX: 30.44 KG/M2 | HEIGHT: 59 IN

## 2021-12-15 DIAGNOSIS — E78.2 MIXED HYPERLIPIDEMIA: ICD-10-CM

## 2021-12-15 DIAGNOSIS — I71.20 THORACIC AORTIC ANEURYSM WITHOUT RUPTURE (HCC): ICD-10-CM

## 2021-12-15 DIAGNOSIS — I25.10 CORONARY ARTERY DISEASE INVOLVING NATIVE CORONARY ARTERY OF NATIVE HEART WITHOUT ANGINA PECTORIS: Primary | ICD-10-CM

## 2021-12-15 DIAGNOSIS — I10 ESSENTIAL HYPERTENSION: ICD-10-CM

## 2021-12-15 PROCEDURE — 93000 ELECTROCARDIOGRAM COMPLETE: CPT | Performed by: INTERNAL MEDICINE

## 2021-12-15 PROCEDURE — 99214 OFFICE O/P EST MOD 30 MIN: CPT | Performed by: INTERNAL MEDICINE

## 2021-12-15 RX ORDER — LIDOCAINE HCL 4% 4 G/100G
1 CREAM TOPICAL AS NEEDED
COMMUNITY
End: 2022-10-25

## 2021-12-15 NOTE — PROGRESS NOTES
Date of Office Visit: 12/15/2021  Encounter Provider: Giulia Squires MD  Place of Service: [facility] CARDIOLOGY  Patient Name: Bird Spear  :1945    Chief complaint  Thoracic aortic aneurysm, coronary artery disease noted on coronary calcium score.    History of Present Illness  Patient is a pleasant 75-year-old female with history of hypertension, hyperlipidemia, renal insufficiency, hypothyroidism.  In  she was found to have a mildly dilated aorta.  This is been followed with serial imaging studies.  She had an echocardiogram in 2016 that showed normal systolic function no significant valvular disease.  She had a nuclear stress test in 2014 that was negative for ischemia.  She had a noncontrast CT of her chest in 2015 that showed a slight increase in the ascending thoracic aortic size forms 3.7-3.9 cm. CT chest without contrast in 2017 revealed mild calcified plaque in the LAD with ectasia of the ascending measuring 3.9 cm is unchanged.  There was also moderate to large sized hiatal hernia breast changes were noted suspicious for her surgery and postop changes.  Hepatic cyst was noted.  Noncontrast CT scan of the chest performed on 2021 showed a sending aorta measuring 4.3 cm aortic root was normal.  It was unchanged.  Previously noted axillary node dissection changes and hepatic cysts as well as old granulomatous disease was again noted.    Since last visit she has not been using her CPAP due to mask difficulties.  She has had no chest pain shortness of breath palpitations syncope near syncope she has had edema of the left ankle.  She is walking 30 minutes and riding a stationary bike for 30 minutes.  To work from 2021 includes normal CMP TSH, , HDL 76, triglycerides 74, CBC unremarkable    Past Medical History:   Diagnosis Date   • Abnormal electrocardiogram    • Arthritis 2016   • Arthritis of neck    • Breast cancer, right  (HCC) 2016    arimidex one year Tamoxifen one year   • Chronic pain syndrome 2016   • CKD (chronic kidney disease) stage 3, GFR 30-59 ml/min (HCC)    • Colon polyp    • Coronary artery disease involving native coronary artery of native heart without angina pectoris    • Depression    • Drug-induced Cushing's syndrome (HCC) 2018   • Edema    • Essential hypertension 2016   • Hematuria 2017   • Hx of radiation therapy    • Hyperlipidemia 2016   • Hypothyroidism (acquired) 2016   • Lumbosacral disc disease    • Renal failure    • Scoliosis    • Thoracic aortic aneurysm (HCC)    • Thyroid disease      Past Surgical History:   Procedure Laterality Date   • BREAST LUMPECTOMY Right 2014   •  SECTION     • EXTRACORPOREAL SHOCK WAVE LITHOTRIPSY (ESWL)     • HYSTERECTOMY     • LUMBAR FUSION  2012    L3,4,5     Outpatient Medications Prior to Visit   Medication Sig Dispense Refill   • amitriptyline (ELAVIL) 25 MG tablet Take 1 tablet by mouth Every Evening. 90 tablet 3   • amLODIPine (NORVASC) 2.5 MG tablet Take 1 tablet by mouth Daily. 90 tablet 3   • aspirin 325 MG EC tablet Take 325 mg by mouth Daily.     • Calcium Carbonate (CALTRATE 600) 1500 (600 CA) MG tablet Take 600 mg by mouth 2 (Two) Times a Day With Meals.     • cetirizine (zyrTEC) 10 MG tablet Take 10 mg by mouth As Needed for Allergies.     • Coenzyme Q10 (COQ10) 200 MG capsule Take 1 capsule by mouth Daily.     • DULoxetine (CYMBALTA) 60 MG capsule Take 1 capsule by mouth Daily. 90 capsule 3   • HYDROcodone-acetaminophen (NORCO) 7.5-325 MG per tablet Take 1 tablet by mouth Every 6 (Six) Hours As Needed.     • levothyroxine (SYNTHROID, LEVOTHROID) 125 MCG tablet Take 1 tablet by mouth Daily. 90 tablet 3   • Lidocaine HCl (Aspercreme Lidocaine) 4 % cream Apply 1 application topically to the appropriate area as directed As Needed for Mild Pain .     • metoprolol tartrate (LOPRESSOR) 50 MG tablet TAKE  "ONE AND ONE-HALF TABLETS TWICE A  tablet 3   • Multiple Vitamin (MULTI VITAMIN) tablet Take  by mouth.     • omeprazole (PriLOSEC) 20 MG capsule Take 1 capsule by mouth as needed (reflux). 90 capsule 2   • polyethylene glycol (MIRALAX) packet Take 8.5 g by mouth As Needed.     • rosuvastatin (Crestor) 5 MG tablet Take 1 tablet by mouth Every Night. 90 tablet 3     No facility-administered medications prior to visit.       Allergies as of 12/15/2021   • (No Known Allergies)     Social History     Socioeconomic History   • Marital status:    Tobacco Use   • Smoking status: Never Smoker   • Smokeless tobacco: Never Used   Vaping Use   • Vaping Use: Never used   Substance and Sexual Activity   • Alcohol use: No     Comment: caffeine use   • Drug use: No   • Sexual activity: Not Currently     Family History   Problem Relation Age of Onset   • Heart disease Mother    • Hyperlipidemia Mother    • Diabetes Mother    • Hypertension Mother    • Heart disease Father         Coronary artery disease   • Hyperlipidemia Father    • Hypertension Father    • Heart disease Sister    • Hypertension Other      Review of Systems   Constitutional: Negative for chills, fever, weight gain and weight loss.   Cardiovascular: Negative for leg swelling.   Respiratory: Negative for cough, snoring and wheezing.    Hematologic/Lymphatic: Negative for bleeding problem. Does not bruise/bleed easily.   Skin: Negative for color change.   Musculoskeletal: Positive for joint pain and myalgias. Negative for falls.   Gastrointestinal: Negative for melena.   Genitourinary: Negative for hematuria.   Neurological: Negative for excessive daytime sleepiness.   Psychiatric/Behavioral: Negative for depression. The patient is not nervous/anxious.         Objective:     Vitals:    12/15/21 1148   BP: 120/68   Pulse: 55   Weight: 68.5 kg (151 lb)   Height: 149.9 cm (59\")     Body mass index is 30.5 kg/m².    Vitals reviewed.   Constitutional:       " Appearance: Well-developed.   Eyes:      General: No scleral icterus.        Right eye: No discharge.      Conjunctiva/sclera: Conjunctivae normal.      Pupils: Pupils are equal, round, and reactive to light.   HENT:      Head: Normocephalic.      Nose: Nose normal.   Neck:      Thyroid: No thyromegaly.      Vascular: No JVD.   Pulmonary:      Effort: Pulmonary effort is normal. No respiratory distress.      Breath sounds: Normal breath sounds. No wheezing. No rales.   Cardiovascular:      Normal rate. Regular rhythm. Normal S1. Normal S2.      Murmurs: There is no murmur.      No gallop.   Pulses:     Intact distal pulses.   Edema:     Peripheral edema absent.   Abdominal:      General: Bowel sounds are normal. There is no distension.      Palpations: Abdomen is soft.      Tenderness: There is no abdominal tenderness. There is no rebound.   Musculoskeletal: Normal range of motion.         General: No tenderness.      Cervical back: Normal range of motion and neck supple. Skin:     General: Skin is warm and dry.      Findings: No erythema or rash.   Neurological:      Mental Status: Alert and oriented to person, place, and time.   Psychiatric:         Behavior: Behavior normal.         Thought Content: Thought content normal.         Judgment: Judgment normal.       Lab Review:     ECG 12 Lead    Date/Time: 12/15/2021 12:04 PM  Performed by: Giulia Squires MD  Authorized by: Giulia Squires MD   Comparison: compared with previous ECG   Similar to previous ECG  Rhythm: sinus rhythm  Other findings: non-specific ST-T wave changes    Clinical impression: abnormal EKG          Assessment:       Diagnosis Plan   1. Coronary artery disease involving native coronary artery of native heart without angina pectoris  ECG 12 Lead   2. Thoracic aortic aneurysm without rupture (HCC)  ECG 12 Lead   3. Essential hypertension     4. Mixed hyperlipidemia       Plan:       1.   Hypertension.  Controlled and will continue current regimen  follow-up in 1 year  2.   Asending aortic aneurysm, she is asymptomatic.  Stable by CT scan with plans for follow-up in 1 year and then every 2 years by CT imaging.  3.   Renal insufficiency.  [place] to be stable  4.   Coronary artery disease noted by CT imaging negative stress test 2014.  Again I discuss it has been a very long time since her last coronary evaluation.  She prefers to defer further stress testing and seems reasonable she is asymptomatic.  We will check vascular screening study including coronary calcium score if this is markedly abnormal with again not defer stress testing.  If there is no severe coronary artery calcification, she can decrease aspirin to enteric-coated aspirin 160 mg day.  5.   Breast cancer.  Had radiation to right breast  6.   Abdominal pain.  7.   Probable hepatic cyst.  8.   Dyslipidemia..  Unfortunately she has had troubles with for statins in the past and still has a significant myalgias with even low-dose Crestor.  She may need to consider therapy with Repatha and will she will discuss this with   9.   Untreated MARCUS  10.  Vascular screening.  She will pursue vascular screening including calcium score as above    Time Spent: I spent 35 minutes caring for Bird on this date of service. This time includes time spent by me in the following activities: preparing for the visit, reviewing tests, obtaining and/or reviewing a separately obtained history, performing a medically appropriate examination and/or evaluation, counseling and educating the patient/family/caregiver, ordering medications, tests, or procedures, documenting information in the medical record and independently interpreting results and communicating that information with the patient/family/caregiver.   I spent 1 minutes on the separately reported service of ECG. This time is not included in the time used to support the E/M service also reported today.        Your medication list          Accurate as of  December 15, 2021 11:59 PM. If you have any questions, ask your nurse or doctor.            CONTINUE taking these medications      Instructions Last Dose Given Next Dose Due   amitriptyline 25 MG tablet  Commonly known as: ELAVIL      Take 1 tablet by mouth Every Evening.       amLODIPine 2.5 MG tablet  Commonly known as: NORVASC      Take 1 tablet by mouth Daily.       Aspercreme Lidocaine 4 % cream  Generic drug: Lidocaine HCl      Apply 1 application topically to the appropriate area as directed As Needed for Mild Pain .       aspirin  MG tablet      Take 325 mg by mouth Daily.       Caltrate 600 1500 (600 Ca) MG tablet  Generic drug: Calcium Carbonate      Take 600 mg by mouth 2 (Two) Times a Day With Meals.       cetirizine 10 MG tablet  Commonly known as: zyrTEC      Take 10 mg by mouth As Needed for Allergies.       CoQ10 200 MG capsule      Take 1 capsule by mouth Daily.       DULoxetine 60 MG capsule  Commonly known as: CYMBALTA      Take 1 capsule by mouth Daily.       HYDROcodone-acetaminophen 7.5-325 MG per tablet  Commonly known as: NORCO      Take 1 tablet by mouth Every 6 (Six) Hours As Needed.       levothyroxine 125 MCG tablet  Commonly known as: SYNTHROID, LEVOTHROID      Take 1 tablet by mouth Daily.       metoprolol tartrate 50 MG tablet  Commonly known as: LOPRESSOR      TAKE ONE AND ONE-HALF TABLETS TWICE A DAY       multivitamin tablet tablet  Commonly known as: THERAGRAN      Take  by mouth.       omeprazole 20 MG capsule  Commonly known as: priLOSEC      Take 1 capsule by mouth as needed (reflux).       polyethylene glycol packet  Commonly known as: MIRALAX      Take 8.5 g by mouth As Needed.       rosuvastatin 5 MG tablet  Commonly known as: Crestor      Take 1 tablet by mouth Every Night.              Patient is no longer taking -.  I corrected the med list to reflect this.  I did not stop these medications.      Dictated utilizing Dragon dictation

## 2022-02-07 ENCOUNTER — OFFICE VISIT (OUTPATIENT)
Dept: FAMILY MEDICINE CLINIC | Facility: CLINIC | Age: 77
End: 2022-02-07

## 2022-02-07 VITALS
WEIGHT: 152.6 LBS | DIASTOLIC BLOOD PRESSURE: 70 MMHG | HEART RATE: 61 BPM | OXYGEN SATURATION: 98 % | HEIGHT: 59 IN | BODY MASS INDEX: 30.76 KG/M2 | TEMPERATURE: 98.2 F | SYSTOLIC BLOOD PRESSURE: 124 MMHG

## 2022-02-07 DIAGNOSIS — I10 ESSENTIAL HYPERTENSION: Primary | ICD-10-CM

## 2022-02-07 DIAGNOSIS — I25.10 CORONARY ARTERY DISEASE INVOLVING NATIVE CORONARY ARTERY OF NATIVE HEART WITHOUT ANGINA PECTORIS: ICD-10-CM

## 2022-02-07 DIAGNOSIS — E78.2 MIXED HYPERLIPIDEMIA: ICD-10-CM

## 2022-02-07 DIAGNOSIS — E03.9 ACQUIRED HYPOTHYROIDISM: ICD-10-CM

## 2022-02-07 DIAGNOSIS — M79.2 NEURITIS: ICD-10-CM

## 2022-02-07 DIAGNOSIS — F32.9 REACTIVE DEPRESSION: ICD-10-CM

## 2022-02-07 PROCEDURE — 99214 OFFICE O/P EST MOD 30 MIN: CPT | Performed by: FAMILY MEDICINE

## 2022-02-07 RX ORDER — METOPROLOL TARTRATE 50 MG/1
75 TABLET, FILM COATED ORAL 2 TIMES DAILY
Qty: 270 TABLET | Refills: 3 | Status: SHIPPED | OUTPATIENT
Start: 2022-02-07 | End: 2022-08-09 | Stop reason: SDUPTHER

## 2022-02-07 NOTE — PROGRESS NOTES
"Chief Complaint  Med Refill, Hypertension, and Hypothyroidism    Subjective          Bird Spear presents to [facility]  History of Present Illness    Bird Spear is a 76-year-old female who presents today for 6-month follow-up on her chronic medical problems including hypertension, dyslipidemia, and thyroid.    Back pain  The patient reports she is doing well today. She denies having any anginal chest pain, but admits she still has her back pain. The patient is up to date with pain management for her back pain, for that she is taking hydrocodone some times 4 times a day when needed. She denies having any falls or confusion. The patient has no other concerns today.     Current Medications  She confirms she is takes aspirin, blood pressure, and thyroid medicine.  The patient confirms she is taking the duloxetine for her mood.     Neuritis  Since 1993 she has taken amitriptyline for neuritis.    Cardiology  The patient is up to date with her cardiologist. She is requesting a refill on metoprolol 75 mg twice a day.       Review of Systems     A review of systems was performed, and the pertinent positives are noted in the HPI.     Objective   Vital Signs:   /70   Pulse 61   Temp 98.2 °F (36.8 °C)   Ht 149.9 cm (59\")   Wt 69.2 kg (152 lb 9.6 oz)   SpO2 98%   BMI 30.82 kg/m²     Physical Exam  Vitals reviewed: blood pressure is under control at 124/70 mmHg.   Constitutional:       General: She is not in acute distress.     Appearance: Normal appearance. She is well-developed.   HENT:      Head: Normocephalic and atraumatic.      Right Ear: Tympanic membrane, ear canal and external ear normal.      Left Ear: Tympanic membrane, ear canal and external ear normal.      Mouth/Throat:      Mouth: Mucous membranes are moist.      Pharynx: Oropharynx is clear.   Eyes:      Conjunctiva/sclera: Conjunctivae normal.      Pupils: Pupils are equal, round, and reactive to light.   Neck: "      Thyroid: No thyromegaly.   Cardiovascular:      Rate and Rhythm: Normal rate and regular rhythm.      Heart sounds: No murmur heard.      Pulmonary:      Effort: Pulmonary effort is normal.      Breath sounds: Normal breath sounds. No wheezing.   Abdominal:      General: Bowel sounds are normal.      Palpations: Abdomen is soft.      Tenderness: There is no abdominal tenderness.   Musculoskeletal:         General: Normal range of motion.      Cervical back: Neck supple.   Lymphadenopathy:      Cervical: No cervical adenopathy.   Skin:     General: Skin is warm and dry.   Neurological:      Mental Status: She is alert and oriented to person, place, and time.   Psychiatric:         Mood and Affect: Mood normal.         Behavior: Behavior normal.        Result Review :                 Assessment and Plan    Diagnoses and all orders for this visit:    1. Essential hypertension (Primary)  -     metoprolol tartrate (LOPRESSOR) 50 MG tablet; Take 1.5 tablets by mouth 2 (Two) Times a Day.  Dispense: 270 tablet; Refill: 3  -     TSH  -     Lipid Panel  -     CBC & Differential  -     Comprehensive Metabolic Panel  - Her blood pressure is under good control today at 124/70 mmHg.     2. Mixed hyperlipidemia  -     Lipid Panel  - Last time, her LDL was elevated so we will recheck with a full panel blood work this morning.     3. Acquired hypothyroidism  -     TSH  - Last time, her TSH was perfect. As of now she does not need a change in medication unless there is a change in her blood work today.    4. Coronary artery disease involving native coronary artery of native heart without angina pectoris    5. Reactive depression    6. Neuritis        Follow Up   Return in about 6 months (around 8/7/2022) for Medicare Wellness.  Patient was given instructions and counseling regarding her condition or for health maintenance advice. Please see specific information pulled into the AVS if appropriate.       Transcribed from ambient  dictation for Meredith Lea Kehrer, MD by Rea Jenkins.  02/08/22   10:53 EST    Patient verbalized consent to the visit recording.  I have personally performed the services described in this document as transcribed by the above individual, and it is both accurate and complete.  Meredith Lea Kehrer, MD  2/8/2022  12:32 EST

## 2022-02-08 DIAGNOSIS — E03.9 ACQUIRED HYPOTHYROIDISM: Primary | ICD-10-CM

## 2022-02-08 LAB
ALBUMIN SERPL-MCNC: 4.4 G/DL (ref 3.7–4.7)
ALBUMIN/GLOB SERPL: 2 {RATIO} (ref 1.2–2.2)
ALP SERPL-CCNC: 68 IU/L (ref 44–121)
ALT SERPL-CCNC: 19 IU/L (ref 0–32)
AST SERPL-CCNC: 26 IU/L (ref 0–40)
BASOPHILS # BLD AUTO: 0 X10E3/UL (ref 0–0.2)
BASOPHILS NFR BLD AUTO: 1 %
BILIRUB SERPL-MCNC: 0.5 MG/DL (ref 0–1.2)
BUN SERPL-MCNC: 15 MG/DL (ref 8–27)
BUN/CREAT SERPL: 16 (ref 12–28)
CALCIUM SERPL-MCNC: 9.6 MG/DL (ref 8.7–10.3)
CHLORIDE SERPL-SCNC: 104 MMOL/L (ref 96–106)
CHOLEST SERPL-MCNC: 179 MG/DL (ref 100–199)
CO2 SERPL-SCNC: 23 MMOL/L (ref 20–29)
CREAT SERPL-MCNC: 0.94 MG/DL (ref 0.57–1)
EOSINOPHIL # BLD AUTO: 0.2 X10E3/UL (ref 0–0.4)
EOSINOPHIL NFR BLD AUTO: 5 %
ERYTHROCYTE [DISTWIDTH] IN BLOOD BY AUTOMATED COUNT: 13 % (ref 11.7–15.4)
GLOBULIN SER CALC-MCNC: 2.2 G/DL (ref 1.5–4.5)
GLUCOSE SERPL-MCNC: 86 MG/DL (ref 65–99)
HCT VFR BLD AUTO: 36.3 % (ref 34–46.6)
HDLC SERPL-MCNC: 70 MG/DL
HGB BLD-MCNC: 11.7 G/DL (ref 11.1–15.9)
IMM GRANULOCYTES # BLD AUTO: 0 X10E3/UL (ref 0–0.1)
IMM GRANULOCYTES NFR BLD AUTO: 0 %
LDLC SERPL CALC-MCNC: 88 MG/DL (ref 0–99)
LYMPHOCYTES # BLD AUTO: 1.1 X10E3/UL (ref 0.7–3.1)
LYMPHOCYTES NFR BLD AUTO: 27 %
MCH RBC QN AUTO: 29.6 PG (ref 26.6–33)
MCHC RBC AUTO-ENTMCNC: 32.2 G/DL (ref 31.5–35.7)
MCV RBC AUTO: 92 FL (ref 79–97)
MONOCYTES # BLD AUTO: 0.6 X10E3/UL (ref 0.1–0.9)
MONOCYTES NFR BLD AUTO: 15 %
NEUTROPHILS # BLD AUTO: 2 X10E3/UL (ref 1.4–7)
NEUTROPHILS NFR BLD AUTO: 52 %
PLATELET # BLD AUTO: 273 X10E3/UL (ref 150–450)
POTASSIUM SERPL-SCNC: 4.8 MMOL/L (ref 3.5–5.2)
PROT SERPL-MCNC: 6.6 G/DL (ref 6–8.5)
RBC # BLD AUTO: 3.95 X10E6/UL (ref 3.77–5.28)
SODIUM SERPL-SCNC: 140 MMOL/L (ref 134–144)
TRIGL SERPL-MCNC: 118 MG/DL (ref 0–149)
TSH SERPL DL<=0.005 MIU/L-ACNC: 0.09 UIU/ML (ref 0.45–4.5)
VLDLC SERPL CALC-MCNC: 21 MG/DL (ref 5–40)
WBC # BLD AUTO: 3.9 X10E3/UL (ref 3.4–10.8)

## 2022-02-08 RX ORDER — LEVOTHYROXINE SODIUM 112 UG/1
112 TABLET ORAL DAILY
Qty: 90 TABLET | Refills: 1 | Status: SHIPPED | OUTPATIENT
Start: 2022-02-08 | End: 2022-02-08

## 2022-02-08 RX ORDER — LEVOTHYROXINE SODIUM 112 UG/1
112 TABLET ORAL DAILY
Qty: 90 TABLET | Refills: 1 | Status: SHIPPED | OUTPATIENT
Start: 2022-02-08 | End: 2022-05-11 | Stop reason: DRUGHIGH

## 2022-05-03 DIAGNOSIS — I10 ESSENTIAL HYPERTENSION: ICD-10-CM

## 2022-05-03 RX ORDER — AMLODIPINE BESYLATE 2.5 MG/1
TABLET ORAL
Qty: 90 TABLET | Refills: 3 | Status: SHIPPED | OUTPATIENT
Start: 2022-05-03 | End: 2022-08-09 | Stop reason: SDUPTHER

## 2022-05-10 ENCOUNTER — LAB (OUTPATIENT)
Dept: FAMILY MEDICINE CLINIC | Facility: CLINIC | Age: 77
End: 2022-05-10

## 2022-05-10 DIAGNOSIS — E78.2 MIXED HYPERLIPIDEMIA: Primary | ICD-10-CM

## 2022-05-10 DIAGNOSIS — E03.9 ACQUIRED HYPOTHYROIDISM: ICD-10-CM

## 2022-05-11 DIAGNOSIS — E03.9 ACQUIRED HYPOTHYROIDISM: Primary | ICD-10-CM

## 2022-05-11 LAB — TSH SERPL DL<=0.005 MIU/L-ACNC: 0.01 UIU/ML (ref 0.45–4.5)

## 2022-05-11 RX ORDER — LEVOTHYROXINE SODIUM 0.1 MG/1
100 TABLET ORAL DAILY
Qty: 90 TABLET | Refills: 1 | Status: SHIPPED | OUTPATIENT
Start: 2022-05-11 | End: 2022-08-09 | Stop reason: SDUPTHER

## 2022-08-01 DIAGNOSIS — F32.9 REACTIVE DEPRESSION: ICD-10-CM

## 2022-08-01 RX ORDER — DULOXETIN HYDROCHLORIDE 60 MG/1
CAPSULE, DELAYED RELEASE ORAL
Qty: 90 CAPSULE | Refills: 0 | Status: SHIPPED | OUTPATIENT
Start: 2022-08-01 | End: 2022-08-09 | Stop reason: SDUPTHER

## 2022-08-09 ENCOUNTER — OFFICE VISIT (OUTPATIENT)
Dept: FAMILY MEDICINE CLINIC | Facility: CLINIC | Age: 77
End: 2022-08-09

## 2022-08-09 VITALS
SYSTOLIC BLOOD PRESSURE: 128 MMHG | OXYGEN SATURATION: 98 % | HEIGHT: 59 IN | WEIGHT: 149.2 LBS | BODY MASS INDEX: 30.08 KG/M2 | HEART RATE: 76 BPM | TEMPERATURE: 97.2 F | DIASTOLIC BLOOD PRESSURE: 70 MMHG

## 2022-08-09 DIAGNOSIS — M79.2 NEURITIS: ICD-10-CM

## 2022-08-09 DIAGNOSIS — Z00.00 MEDICARE ANNUAL WELLNESS VISIT, SUBSEQUENT: Primary | ICD-10-CM

## 2022-08-09 DIAGNOSIS — F32.9 REACTIVE DEPRESSION: ICD-10-CM

## 2022-08-09 DIAGNOSIS — F11.90 OPIOID USE: ICD-10-CM

## 2022-08-09 DIAGNOSIS — I25.10 CORONARY ARTERY DISEASE INVOLVING NATIVE CORONARY ARTERY OF NATIVE HEART WITHOUT ANGINA PECTORIS: ICD-10-CM

## 2022-08-09 DIAGNOSIS — E03.9 ACQUIRED HYPOTHYROIDISM: ICD-10-CM

## 2022-08-09 DIAGNOSIS — I10 ESSENTIAL HYPERTENSION: ICD-10-CM

## 2022-08-09 DIAGNOSIS — E78.2 MIXED HYPERLIPIDEMIA: ICD-10-CM

## 2022-08-09 DIAGNOSIS — Z12.31 ENCOUNTER FOR SCREENING MAMMOGRAM FOR MALIGNANT NEOPLASM OF BREAST: ICD-10-CM

## 2022-08-09 PROCEDURE — 99213 OFFICE O/P EST LOW 20 MIN: CPT | Performed by: FAMILY MEDICINE

## 2022-08-09 PROCEDURE — 99397 PER PM REEVAL EST PAT 65+ YR: CPT | Performed by: FAMILY MEDICINE

## 2022-08-09 RX ORDER — LEVOTHYROXINE SODIUM 0.1 MG/1
100 TABLET ORAL DAILY
Qty: 90 TABLET | Refills: 3 | Status: SHIPPED | OUTPATIENT
Start: 2022-08-09 | End: 2023-02-09

## 2022-08-09 RX ORDER — DULOXETIN HYDROCHLORIDE 60 MG/1
60 CAPSULE, DELAYED RELEASE ORAL DAILY
Qty: 90 CAPSULE | Refills: 3 | Status: SHIPPED | OUTPATIENT
Start: 2022-08-09 | End: 2023-02-09

## 2022-08-09 RX ORDER — LIDOCAINE 50 MG/G
1 PATCH TOPICAL EVERY 24 HOURS
Qty: 90 EACH | Refills: 3 | Status: SHIPPED | OUTPATIENT
Start: 2022-08-09 | End: 2022-10-25

## 2022-08-09 RX ORDER — AMITRIPTYLINE HYDROCHLORIDE 25 MG/1
25 TABLET, FILM COATED ORAL EVERY EVENING
Qty: 90 TABLET | Refills: 3 | Status: SHIPPED | OUTPATIENT
Start: 2022-08-09 | End: 2023-02-17 | Stop reason: SDUPTHER

## 2022-08-09 RX ORDER — METOPROLOL TARTRATE 50 MG/1
75 TABLET, FILM COATED ORAL 2 TIMES DAILY
Qty: 270 TABLET | Refills: 3 | Status: SHIPPED | OUTPATIENT
Start: 2022-08-09

## 2022-08-09 RX ORDER — AMLODIPINE BESYLATE 2.5 MG/1
2.5 TABLET ORAL DAILY
Qty: 90 TABLET | Refills: 3 | Status: SHIPPED | OUTPATIENT
Start: 2022-08-09 | End: 2023-02-09

## 2022-08-09 RX ORDER — ROSUVASTATIN CALCIUM 5 MG/1
5 TABLET, COATED ORAL NIGHTLY
Qty: 90 TABLET | Refills: 3 | Status: SHIPPED | OUTPATIENT
Start: 2022-08-09 | End: 2023-02-09

## 2022-08-09 NOTE — PATIENT INSTRUCTIONS
Medicare Wellness  Personal Prevention Plan of Service     Date of Office Visit:    Encounter Provider:  Meredith Lea Kehrer, MD  Place of Service:  [facility]  Patient Name: Bird Spear  :  1945    As part of the Medicare Wellness portion of your visit today, we are providing you with this personalized preventive plan of services (PPPS). This plan is based upon recommendations of the [place] Preventive Services Task Force (USPSTF) and the Advisory Committee on Immunization Practices (ACIP).    This lists the preventive care services that should be considered, and provides dates of when you are due. Items listed as completed are up-to-date and do not require any further intervention.    Health Maintenance   Topic Date Due    TDAP/TD VACCINES (2 - Td or Tdap) 2021    COVID-19 Vaccine (4 - Booster for Moderna series) 2022    ANNUAL PHYSICAL  2022    INFLUENZA VACCINE  10/01/2022    LIPID PANEL  2023    DXA SCAN  2023    MAMMOGRAM  2023    COLORECTAL CANCER SCREENING  2023    HEPATITIS C SCREENING  Completed    Pneumococcal Vaccine 65+  Completed    ZOSTER VACCINE  Completed       Orders Placed This Encounter   Procedures    Mammo Screening Digital Tomosynthesis Bilateral With CAD     Standing Status:   Future     Standing Expiration Date:   2023     Order Specific Question:   Reason for Exam:     Answer:   screening     Order Specific Question:   Release to patient     Answer:   Immediate    Lipid Panel    Comprehensive Metabolic Panel     Order Specific Question:   Release to patient     Answer:   Immediate    TSH     Order Specific Question:   Release to patient     Answer:   Immediate    CBC & Differential     Order Specific Question:   Manual Differential     Answer:   No       Return in about 6 months (around 2023) for Recheck.

## 2022-08-09 NOTE — PROGRESS NOTES
The ABCs of the Annual Wellness Visit  Subsequent Medicare Wellness Visit    Chief Complaint   Patient presents with   • Medicare Wellness-subsequent   • Hypertension   • Hyperlipidemia   • Pain      Subjective    History of Present Illness:  Bird Spear is a 76 y.o. female who presents for a Subsequent Medicare Wellness Visit.  Patient presents for her Medicare wellness as well as follow-up on her hypertension, hyperlipidemia, hypothyroid and other chronic medical problems.  She is compliant with her medications and doing well in general.  She does have 1 new complaint is some numbness and pain shooting down her right arm.  She denies any other neurologic symptoms.  She is up-to-date with pain management for her hydrocodone.  And keeps her arthritis pain tolerable.  She had a lidocaine patch she used on her right arm pain and it really helped and she is wondering about a prescription.  She denies any significant neck pain today.    The following portions of the patient's history were reviewed and   updated as appropriate: allergies, current medications, past family history, past medical history, past social history, past surgical history and problem list.    Compared to one year ago, the patient feels her physical   health is the same.    Compared to one year ago, the patient feels her mental   health is the same.    Recent Hospitalizations:  She was not admitted to the hospital during the last year.       Current Medical Providers:  Patient Care Team:  Kehrer, Meredith Lea, MD as PCP - General (Family Medicine)  Armond Kilpatrick MD as Consulting Physician (Orthopedic Surgery)  Giulia Squires MD as Consulting Physician (Cardiology)  Fortino Sánchez MD as Consulting Physician (Medical Oncology)  Kb Brooks MD as Consulting Physician (Gastroenterology)  Sandro Madison MD as Consulting Physician (Pain Medicine)    Outpatient Medications Prior to Visit   Medication Sig Dispense Refill   •  aspirin 325 MG EC tablet Take 325 mg by mouth Daily.     • Calcium Carbonate 1500 (600 Ca) MG tablet Take 600 mg by mouth 2 (Two) Times a Day With Meals.     • cetirizine (zyrTEC) 10 MG tablet Take 10 mg by mouth As Needed for Allergies.     • Coenzyme Q10 (COQ10) 200 MG capsule Take 1 capsule by mouth Daily.     • HYDROcodone-acetaminophen (NORCO) 7.5-325 MG per tablet Take 1 tablet by mouth Every 6 (Six) Hours As Needed.     • Lidocaine HCl (LMX) 4 % cream Apply 1 application topically to the appropriate area as directed As Needed for Mild Pain .     • Multiple Vitamin (MULTI VITAMIN) tablet Take  by mouth.     • omeprazole (PriLOSEC) 20 MG capsule Take 1 capsule by mouth as needed (reflux). 90 capsule 2   • polyethylene glycol (MIRALAX) packet Take 8.5 g by mouth As Needed.     • amitriptyline (ELAVIL) 25 MG tablet Take 1 tablet by mouth Every Evening. 90 tablet 3   • amLODIPine (NORVASC) 2.5 MG tablet TAKE 1 TABLET DAILY 90 tablet 3   • DULoxetine (CYMBALTA) 60 MG capsule TAKE 1 CAPSULE DAILY 90 capsule 0   • levothyroxine (Synthroid) 100 MCG tablet Take 1 tablet by mouth Daily. 90 tablet 1   • metoprolol tartrate (LOPRESSOR) 50 MG tablet Take 1.5 tablets by mouth 2 (Two) Times a Day. 270 tablet 3   • rosuvastatin (Crestor) 5 MG tablet Take 1 tablet by mouth Every Night. 90 tablet 3     No facility-administered medications prior to visit.       Opioid medication/s are on active medication list.  and I have evaluated her active treatment plan and pain score trends (see table).  There were no vitals filed for this visit.  I have reviewed the chart for potential of high risk medication and harmful drug interactions in the elderly.            Aspirin is on active medication list. Aspirin use is indicated based on review of current medical condition/s. Pros and cons of this therapy have been discussed today. Benefits of this medication outweigh potential harm.  Patient has been encouraged to continue taking this  "medication.  .      Patient Active Problem List   Diagnosis   • Chronic pain syndrome   • Arthritis   • Essential hypertension   • Hypothyroidism   • Mixed hyperlipidemia   • Reflux esophagitis   • Abnormal electrocardiogram   • Degenerative spondylolisthesis   • Spinal stenosis of lumbar region   • Thoracic aortic aneurysm (HCC)   • Greater trochanteric bursitis of both hips   • Tendinopathy of rotator cuff, right   • Acromioclavicular joint arthritis   • CKD (chronic kidney disease) stage 3, GFR 30-59 ml/min ([facility])   • Hiatal hernia   • Coronary artery disease involving native coronary artery of native heart without angina pectoris   • Reactive depression   • Bradycardia   • Renal atrophy, bilateral     Advance Care Planning  Advance Directive is on file.  ACP discussion was held with the patient during this visit. Patient has an advance directive in EMR which is still valid.           Objective    Vitals:    08/09/22 0949   BP: 128/70   Pulse: 76   Temp: 97.2 °F (36.2 °C)   SpO2: 98%   Weight: 67.7 kg (149 lb 3.2 oz)   Height: 149.9 cm (59\")     Estimated body mass index is 30.13 kg/m² as calculated from the following:    Height as of this encounter: 149.9 cm (59\").    Weight as of this encounter: 67.7 kg (149 lb 3.2 oz).    BMI is >= 30 and <35. (Class 1 Obesity). The following options were offered after discussion;: nutrition counseling/recommendations      Does the patient have evidence of cognitive impairment? No    Physical Exam  Vitals and nursing note reviewed.   Constitutional:       General: She is not in acute distress.     Appearance: Normal appearance. She is well-developed.   HENT:      Head: Normocephalic and atraumatic.      Right Ear: Tympanic membrane, ear canal and external ear normal.      Left Ear: Tympanic membrane, ear canal and external ear normal.      Nose: Nose normal.      Mouth/Throat:      Mouth: Mucous membranes are moist.      Pharynx: Oropharynx is clear. No oropharyngeal exudate or " posterior oropharyngeal erythema.   Eyes:      Conjunctiva/sclera: Conjunctivae normal.      Pupils: Pupils are equal, round, and reactive to light.   Neck:      Thyroid: No thyromegaly.   Cardiovascular:      Rate and Rhythm: Normal rate and regular rhythm.      Heart sounds: No murmur heard.  Pulmonary:      Effort: Pulmonary effort is normal.      Breath sounds: Normal breath sounds. No wheezing.   Abdominal:      General: Abdomen is flat. Bowel sounds are normal. There is no distension.      Palpations: Abdomen is soft. There is no mass.      Tenderness: There is no abdominal tenderness.      Hernia: No hernia is present.   Musculoskeletal:         General: Tenderness present. No swelling. Normal range of motion.      Cervical back: Normal range of motion and neck supple.      Right lower leg: No edema.      Left lower leg: No edema.      Comments: Tender in multiple joints throughout with some increased pain in the right shoulder   Lymphadenopathy:      Cervical: No cervical adenopathy.   Skin:     General: Skin is warm and dry.      Capillary Refill: Capillary refill takes less than 2 seconds.      Findings: No rash.   Neurological:      General: No focal deficit present.      Mental Status: She is alert and oriented to person, place, and time.      Cranial Nerves: No cranial nerve deficit.   Psychiatric:         Mood and Affect: Mood normal.         Behavior: Behavior normal.                 HEALTH RISK ASSESSMENT    Smoking Status:  Social History     Tobacco Use   Smoking Status Never Smoker   Smokeless Tobacco Never Used     Alcohol Consumption:  Social History     Substance and Sexual Activity   Alcohol Use No    Comment: caffeine use     Fall Risk Screen:    STEADI Fall Risk Assessment was completed, and patient is at LOW risk for falls.Assessment completed on:8/9/2022    Depression Screening:  PHQ-2/PHQ-9 Depression Screening 8/9/2022   Retired PHQ-9 Total Score -   Retired Total Score -   Jeanette  Interest or Pleasure in Doing Things 0-->not at all   Feeling Down, Depressed or Hopeless 0-->not at all   PHQ-9: Brief Depression Severity Measure Score 0       Health Habits and Functional and Cognitive Screening:  Functional & Cognitive Status 8/9/2022   Do you have difficulty preparing food and eating? No   Do you have difficulty bathing yourself, getting dressed or grooming yourself? No   Do you have difficulty using the toilet? No   Do you have difficulty moving around from place to place? No   Do you have trouble with steps or getting out of a bed or a chair? Yes   Current Diet Well Balanced Diet   Dental Exam Up to date   Eye Exam Up to date   Exercise (times per week) 0 times per week   Current Exercises Include No Regular Exercise   Current Exercise Activities Include -   Do you need help using the phone?  No   Are you deaf or do you have serious difficulty hearing?  No   Do you need help with transportation? No   Do you need help shopping? No   Do you need help preparing meals?  No   Do you need help with housework?  No   Do you need help with laundry? No   Do you need help taking your medications? No   Do you need help managing money? No   Do you ever drive or ride in a car without wearing a seat belt? No   Have you felt unusual stress, anger or loneliness in the last month? -   Who do you live with? -   If you need help, do you have trouble finding someone available to you? -   Have you been bothered in the last four weeks by sexual problems? -   Do you have difficulty concentrating, remembering or making decisions? -       Age-appropriate Screening Schedule:  Refer to the list below for future screening recommendations based on patient's age, sex and/or medical conditions. Orders for these recommended tests are listed in the plan section. The patient has been provided with a written plan.    Health Maintenance   Topic Date Due   • TDAP/TD VACCINES (2 - Td or Tdap) 04/05/2021   • INFLUENZA VACCINE   10/01/2022   • LIPID PANEL  02/07/2023   • DXA SCAN  11/01/2023   • MAMMOGRAM  11/02/2023   • ZOSTER VACCINE  Completed              Assessment & Plan   CMS Preventative Services Quick Reference  Risk Factors Identified During Encounter  Cardiovascular Disease  The above risks/problems have been discussed with the patient.  Follow up actions/plans if indicated are seen below in the Assessment/Plan Section.  Pertinent information has been shared with the patient in the After Visit Summary.    Diagnoses and all orders for this visit:    1. Medicare annual wellness visit, subsequent (Primary)    2. Essential hypertension  -     metoprolol tartrate (LOPRESSOR) 50 MG tablet; Take 1.5 tablets by mouth 2 (Two) Times a Day.  Dispense: 270 tablet; Refill: 3  -     amLODIPine (NORVASC) 2.5 MG tablet; Take 1 tablet by mouth Daily.  Dispense: 90 tablet; Refill: 3  -     Lipid Panel  -     CBC & Differential  -     Comprehensive Metabolic Panel    3. Acquired hypothyroidism  -     levothyroxine (Synthroid) 100 MCG tablet; Take 1 tablet by mouth Daily.  Dispense: 90 tablet; Refill: 3  -     TSH    4. Mixed hyperlipidemia  -     rosuvastatin (Crestor) 5 MG tablet; Take 1 tablet by mouth Every Night.  Dispense: 90 tablet; Refill: 3  -     Lipid Panel  -     Comprehensive Metabolic Panel    5. Reactive depression  -     amitriptyline (ELAVIL) 25 MG tablet; Take 1 tablet by mouth Every Evening.  Dispense: 90 tablet; Refill: 3  -     DULoxetine (CYMBALTA) 60 MG capsule; Take 1 capsule by mouth Daily.  Dispense: 90 capsule; Refill: 3    6. Coronary artery disease involving native coronary artery of native heart without angina pectoris  -     Lipid Panel    7. Opioid use    8. Neuritis  -     lidocaine (LIDODERM) 5 %; Place 1 patch on the skin as directed by provider Daily. Remove & Discard patch within 12 hours or as directed by MD  Dispense: 90 each; Refill: 3    9. Encounter for screening mammogram for malignant neoplasm of breast  -      Mammo Screening Digital Tomosynthesis Bilateral With CAD; Future    Hypertension-controlled, continue current medication  Hypothyroidism- recheck labs  Hyperlipidemia and coronary artery disease-recheck lipid panel to make sure its to goal, continue allergy follow-up  Depression-continue current medications since symptoms are well controlled  Opioid use-patient aware of the risks and will continue with pain management follow-up  Neuritis-we will do a trial of a lidocaine patch      Follow Up:   Return in about 6 months (around 2/9/2023) for Recheck.     An After Visit Summary and PPPS were made available to the patient.

## 2022-08-10 LAB
ALBUMIN SERPL-MCNC: 4.5 G/DL (ref 3.7–4.7)
ALBUMIN/GLOB SERPL: 2 {RATIO} (ref 1.2–2.2)
ALP SERPL-CCNC: 80 IU/L (ref 44–121)
ALT SERPL-CCNC: 17 IU/L (ref 0–32)
AST SERPL-CCNC: 25 IU/L (ref 0–40)
BASOPHILS # BLD AUTO: 0 X10E3/UL (ref 0–0.2)
BASOPHILS NFR BLD AUTO: 1 %
BILIRUB SERPL-MCNC: 0.5 MG/DL (ref 0–1.2)
BUN SERPL-MCNC: 21 MG/DL (ref 8–27)
BUN/CREAT SERPL: 21 (ref 12–28)
CALCIUM SERPL-MCNC: 9.4 MG/DL (ref 8.7–10.3)
CHLORIDE SERPL-SCNC: 102 MMOL/L (ref 96–106)
CHOLEST SERPL-MCNC: 196 MG/DL (ref 100–199)
CO2 SERPL-SCNC: 22 MMOL/L (ref 20–29)
CREAT SERPL-MCNC: 1.02 MG/DL (ref 0.57–1)
EGFRCR SERPLBLD CKD-EPI 2021: 57 ML/MIN/1.73
EOSINOPHIL # BLD AUTO: 0.2 X10E3/UL (ref 0–0.4)
EOSINOPHIL NFR BLD AUTO: 6 %
ERYTHROCYTE [DISTWIDTH] IN BLOOD BY AUTOMATED COUNT: 14.5 % (ref 11.7–15.4)
GLOBULIN SER CALC-MCNC: 2.2 G/DL (ref 1.5–4.5)
GLUCOSE SERPL-MCNC: 81 MG/DL (ref 65–99)
HCT VFR BLD AUTO: 33.1 % (ref 34–46.6)
HDLC SERPL-MCNC: 69 MG/DL
HGB BLD-MCNC: 10.2 G/DL (ref 11.1–15.9)
IMM GRANULOCYTES # BLD AUTO: 0 X10E3/UL (ref 0–0.1)
IMM GRANULOCYTES NFR BLD AUTO: 0 %
LDLC SERPL CALC-MCNC: 113 MG/DL (ref 0–99)
LYMPHOCYTES # BLD AUTO: 0.9 X10E3/UL (ref 0.7–3.1)
LYMPHOCYTES NFR BLD AUTO: 23 %
MCH RBC QN AUTO: 26.5 PG (ref 26.6–33)
MCHC RBC AUTO-ENTMCNC: 30.8 G/DL (ref 31.5–35.7)
MCV RBC AUTO: 86 FL (ref 79–97)
MONOCYTES # BLD AUTO: 0.6 X10E3/UL (ref 0.1–0.9)
MONOCYTES NFR BLD AUTO: 15 %
NEUTROPHILS # BLD AUTO: 2.2 X10E3/UL (ref 1.4–7)
NEUTROPHILS NFR BLD AUTO: 55 %
PLATELET # BLD AUTO: 237 X10E3/UL (ref 150–450)
POTASSIUM SERPL-SCNC: 4.3 MMOL/L (ref 3.5–5.2)
PROT SERPL-MCNC: 6.7 G/DL (ref 6–8.5)
RBC # BLD AUTO: 3.85 X10E6/UL (ref 3.77–5.28)
SODIUM SERPL-SCNC: 140 MMOL/L (ref 134–144)
TRIGL SERPL-MCNC: 78 MG/DL (ref 0–149)
TSH SERPL DL<=0.005 MIU/L-ACNC: 0.09 UIU/ML (ref 0.45–4.5)
VLDLC SERPL CALC-MCNC: 14 MG/DL (ref 5–40)
WBC # BLD AUTO: 4 X10E3/UL (ref 3.4–10.8)

## 2022-08-11 ENCOUNTER — TELEPHONE (OUTPATIENT)
Dept: FAMILY MEDICINE CLINIC | Facility: CLINIC | Age: 77
End: 2022-08-11

## 2022-08-11 DIAGNOSIS — D64.9 ANEMIA, UNSPECIFIED TYPE: Primary | ICD-10-CM

## 2022-08-11 LAB
FERRITIN SERPL-MCNC: 15 NG/ML (ref 15–150)
FOLATE SERPL-MCNC: 18.4 NG/ML
IRON SERPL-MCNC: 52 UG/DL (ref 27–139)
VIT B12 SERPL-MCNC: 919 PG/ML (ref 232–1245)
WRITTEN AUTHORIZATION: NORMAL

## 2022-08-11 NOTE — TELEPHONE ENCOUNTER
----- Message from Meredith Lea Kehrer, MD sent at 8/10/2022 12:44 PM EDT -----  Patient's has cholesterol gone up.  Please make sure she is taking her rosuvastatin daily.  She has become anemic.  Please add iron ferritin B12 and folate to her labs.  Unknown is increased slightly.  She needs to try to drink more water.  TSH is fair.  Continue current dose and recheck at routine follow-up.

## 2022-08-12 ENCOUNTER — TELEPHONE (OUTPATIENT)
Dept: FAMILY MEDICINE CLINIC | Facility: CLINIC | Age: 77
End: 2022-08-12

## 2022-10-04 ENCOUNTER — TELEPHONE (OUTPATIENT)
Dept: CARDIAC SURGERY | Facility: CLINIC | Age: 77
End: 2022-10-04

## 2022-10-04 NOTE — TELEPHONE ENCOUNTER
Caller: Bird Spear    Relationship to patient: Self    Best call back number: [phone]    Type of visit: I YEAR F/U     Requested date: RIGTH AFTER CT     If rescheduling, when is the original appointment:      Additional notes: PT HAD A TELEHEALTH APPT LAST YEAR AROUND THIS TIME AND PREFERRED ANOTHER ONE FOR HER 1 YEAR F/U THAT NEEDS TO BE SCHEDULED. PT IS OKAY WITH COMING INTO THE OFFICE, HUBS BOOKS WERE FULL UNTIL NOV.1.

## 2022-10-05 ENCOUNTER — HOSPITAL ENCOUNTER (OUTPATIENT)
Dept: CT IMAGING | Facility: HOSPITAL | Age: 77
Discharge: HOME | End: 2022-10-05
Admitting: NURSE PRACTITIONER

## 2022-10-05 DIAGNOSIS — I77.810 THORACIC AORTIC ECTASIA: ICD-10-CM

## 2022-10-05 PROCEDURE — 71250 CT THORAX DX C-: CPT

## 2022-10-25 ENCOUNTER — OFFICE VISIT (OUTPATIENT)
Dept: CARDIAC SURGERY | Facility: CLINIC | Age: 77
End: 2022-10-25

## 2022-10-25 VITALS
DIASTOLIC BLOOD PRESSURE: 81 MMHG | BODY MASS INDEX: 29.43 KG/M2 | HEART RATE: 64 BPM | HEIGHT: 59 IN | WEIGHT: 146 LBS | OXYGEN SATURATION: 96 % | SYSTOLIC BLOOD PRESSURE: 133 MMHG | RESPIRATION RATE: 20 BRPM | TEMPERATURE: 97.5 F

## 2022-10-25 DIAGNOSIS — I10 ESSENTIAL HYPERTENSION: Primary | ICD-10-CM

## 2022-10-25 DIAGNOSIS — N18.30 STAGE 3 CHRONIC KIDNEY DISEASE, UNSPECIFIED WHETHER STAGE 3A OR 3B CKD: ICD-10-CM

## 2022-10-25 DIAGNOSIS — I71.21 ANEURYSM OF ASCENDING AORTA WITHOUT RUPTURE: ICD-10-CM

## 2022-10-25 PROCEDURE — 99213 OFFICE O/P EST LOW 20 MIN: CPT | Performed by: NURSE PRACTITIONER

## 2022-10-25 NOTE — PROGRESS NOTES
10/25/2022      Subjective:      Kehrer, Meredith Lea, MD    Chief Complaint: Follow-up ascending aortic ectasia    History of Present Illness:       Dear Dr. Kehrer, Meredith Lea, MD and Colleagues,    It was nice to see Bird Spear in follow up for ascending aortic dilatation.  She is a 77 y.o. female with hypertension, hyperlipidemia, CKD III with previous small cortical infarct to kidney, right breast cancer status postlumpectomy and radiation, depression, hiatal hernia, chronic back pain, osteoarthritis with lumbar fusion, and remote kidney stone who had an incidental finding of aortic dilatation of 3.9 cm in 2012, interval growth in 2020 when she was referred to our office at 4.3 cm.  She is here for routine surveillance.  The CT of chest without contrast on 10/5/2022 was reviewed and the ascending aorta measures maximally at 4.2 cm, DTA 2.3 cm by my measurements.  Previous echocardiogram in 2016 demonstrated a tricuspid aortic valve.  She denies shortness of breath, chest/back pain, numbness/tingling/pain of extremities above the normal tingling that she has due to her carpal tunnel syndrome.  She states there have been no major health history changes since I saw her last year.  No vascular deficiencies or hyperextensible or hypermobile joints are noted on exam.  The patient's family history is negative for aneurysms or dissections, negative for connective tissue disease, and positive for coronary disease in first degree family members with her father having had an MI in his 50s, her sister expiring from CHF complications at age 80, and mother expiring from sudden death at age 52.  She states that she does not know if she would undergo surgery if her aorta were to become surgical size, we had discussion regarding cessation of testing should she decide that she would absolutely never be open to surgical intervention.    Social history: Denies smoking, denies EtOH, denies illicits, denies herbal use, denies  caffeine use with the exception of occasional use when going out to dinner.  Lives with  of 58 years, is a retired , continues to be relatively active with yard work    Patient Active Problem List   Diagnosis   • Chronic pain syndrome   • Arthritis   • Essential hypertension   • Hypothyroidism   • Mixed hyperlipidemia   • Reflux esophagitis   • Abnormal electrocardiogram   • Degenerative spondylolisthesis   • Spinal stenosis of lumbar region   • Thoracic aortic aneurysm (HCC)   • Greater trochanteric bursitis of both hips   • Tendinopathy of rotator cuff, right   • Acromioclavicular joint arthritis   • CKD (chronic kidney disease) stage 3, GFR 30-59 ml/min ([facility])   • Hiatal hernia   • Coronary artery disease involving native coronary artery of native heart without angina pectoris   • Reactive depression   • Bradycardia   • Renal atrophy, bilateral       Past Medical History:   Diagnosis Date   • Abnormal electrocardiogram    • Arthritis 2016   • Arthritis of neck    • Breast cancer, right ([facility]) 2016    arimidex one year Tamoxifen one year   • Chronic pain syndrome 2016   • CKD (chronic kidney disease) stage 3, GFR 30-59 ml/min ([facility])    • Colon polyp    • Coronary artery disease involving native coronary artery of native heart without angina pectoris    • Depression    • Drug-induced Cushing's syndrome ([facility]) 2018   • Edema    • Essential hypertension 2016   • Hematuria 2017   • Hx of radiation therapy    • Hyperlipidemia 2016   • Hypothyroidism (acquired) 2016   • Lumbosacral disc disease    • Renal failure    • Scoliosis    • Thoracic aortic aneurysm    • Thyroid disease        Past Surgical History:   Procedure Laterality Date   • BREAST LUMPECTOMY Right 2014   •  SECTION     • EXTRACORPOREAL SHOCK WAVE LITHOTRIPSY ([facility])     • HYSTERECTOMY     • LUMBAR FUSION  2012    L3,4,5       No Known Allergies      Current Outpatient  Medications:   •  amitriptyline (ELAVIL) 25 MG tablet, Take 1 tablet by mouth Every Evening., Disp: 90 tablet, Rfl: 3  •  amLODIPine (NORVASC) 2.5 MG tablet, Take 1 tablet by mouth Daily., Disp: 90 tablet, Rfl: 3  •  aspirin 325 MG EC tablet, Take 325 mg by mouth Daily., Disp: , Rfl:   •  Calcium Carbonate 1500 (600 Ca) MG tablet, Take 600 mg by mouth 2 (Two) Times a Day With Meals., Disp: , Rfl:   •  cetirizine (zyrTEC) 10 MG tablet, Take 10 mg by mouth As Needed for Allergies., Disp: , Rfl:   •  Coenzyme Q10 (COQ10) 200 MG capsule, Take 1 capsule by mouth Daily., Disp: , Rfl:   •  DULoxetine (CYMBALTA) 60 MG capsule, Take 1 capsule by mouth Daily., Disp: 90 capsule, Rfl: 3  •  HYDROcodone-acetaminophen (NORCO) 7.5-325 MG per tablet, Take 1 tablet by mouth Every 6 (Six) Hours As Needed., Disp: , Rfl:   •  levothyroxine (Synthroid) 100 MCG tablet, Take 1 tablet by mouth Daily., Disp: 90 tablet, Rfl: 3  •  metoprolol tartrate (LOPRESSOR) 50 MG tablet, Take 1.5 tablets by mouth 2 (Two) Times a Day., Disp: 270 tablet, Rfl: 3  •  Multiple Vitamin (MULTI VITAMIN) tablet, Take  by mouth., Disp: , Rfl:   •  omeprazole (PriLOSEC) 20 MG capsule, Take 1 capsule by mouth as needed (reflux)., Disp: 90 capsule, Rfl: 2  •  polyethylene glycol (MIRALAX) packet, Take 8.5 g by mouth As Needed., Disp: , Rfl:   •  rosuvastatin (Crestor) 5 MG tablet, Take 1 tablet by mouth Every Night., Disp: 90 tablet, Rfl: 3    Social History     Socioeconomic History   • Marital status:    Tobacco Use   • Smoking status: Never   • Smokeless tobacco: Never   Vaping Use   • Vaping Use: Never used   Substance and Sexual Activity   • Alcohol use: No     Comment: caffeine use   • Drug use: No   • Sexual activity: Not Currently       Family History   Problem Relation Age of Onset   • Heart disease Mother    • Hyperlipidemia Mother    • Diabetes Mother    • Hypertension Mother    • Heart disease Father         Coronary artery disease   •  Hyperlipidemia Father    • Hypertension Father    • Heart disease Sister    • Hypertension Other        The following portions of the patient's history were reviewed and updated as appropriate: allergies, current medications, past family history, past medical history, past social history, past surgical history and problem list.    Review of Systems:  Review of Systems   Constitutional: Negative for activity change and fatigue.   Respiratory: Negative for apnea, chest tightness and shortness of breath.    Cardiovascular: Negative for chest pain and palpitations.   Gastrointestinal: Negative for nausea and vomiting.   Skin: Negative for color change and pallor.   Neurological: Negative for dizziness, syncope, weakness, light-headedness and numbness.   All other systems reviewed and are negative.      Physical Exam:    Vital Signs:  Weight: 66.2 kg (146 lb)   Body mass index is 29.49 kg/m².  Temp: 97.5 °F (36.4 °C)   Heart Rate: 64   BP: 133/81     Constitutional:       Appearance: Healthy appearance. Well-developed and well-groomed.   Neck:      Vascular: Normal carotid pulses. No carotid bruit or JVD.   Pulmonary:      Effort: Pulmonary effort is normal.      Breath sounds: Normal breath sounds. No decreased breath sounds. No wheezing. No rhonchi. No rales.   Cardiovascular:      Normal rate. Regular rhythm.      Murmurs: There is no murmur.      No gallop. No rub.   Pulses:     Carotid: 2+ bilaterally.     Radial: 2+ bilaterally.     Posterior tibial: 2+ bilaterally.  Abdominal:      General: Bowel sounds are normal. There is no distension.      Palpations: Abdomen is soft. There is no abdominal mass.      Tenderness: There is no abdominal tenderness. There is no guarding.   Skin:     General: Skin is warm and dry.      Coloration: Skin is not pale.      Findings: No erythema or rash.   Neurological:      Mental Status: Alert, oriented to person, place, and time and oriented to person, place and time.   Psychiatric:          Attention and Perception: Attention normal.         Mood and Affect: Mood normal.         Speech: Speech normal.         Behavior: Behavior normal. Behavior is cooperative.         Thought Content: Thought content normal.         Judgment: Judgment normal.          Assessment:     1.  Ascending aortic dilatation----stable at 4.2-4.3 cm, surgical threshold is lower with her diminutive stature  2.  Hypertension----well-controlled, follows with Dr. Squires  3.  Potential familial history of aneurysm with mother expiring from sudden death at age 52  4.  CKD III, history renal cortical infarct----avoid contrast CT unless absolutely necessary  5.  Hyperlipidemia----on statin, followed by primary  6.  Chronic back pain----on chronic narcotics    Recommendation/Plan:     Repeat CT of the chest without contrast 1 year, if continued stability may increase surveillance to every 2 years    Although risk of rupture is low, emergency department presentation is warranted for acute chest, back, or abdominal pain, syncope, or stroke like symptoms    Thank you for allowing us to participate in her care.    Regards,    DARINEL Cali    ** >25 minutes in face to face conversation regarding treatment plan, education, and answering any questions the patient may have had

## 2022-11-03 ENCOUNTER — APPOINTMENT (OUTPATIENT)
Dept: WOMENS IMAGING | Facility: HOSPITAL | Age: 77
End: 2022-11-03

## 2022-11-03 PROCEDURE — 77063 BREAST TOMOSYNTHESIS BI: CPT | Performed by: RADIOLOGY

## 2022-11-03 PROCEDURE — 77067 SCR MAMMO BI INCL CAD: CPT | Performed by: RADIOLOGY

## 2022-11-08 DIAGNOSIS — Z12.31 ENCOUNTER FOR SCREENING MAMMOGRAM FOR MALIGNANT NEOPLASM OF BREAST: ICD-10-CM

## 2023-02-09 ENCOUNTER — OFFICE VISIT (OUTPATIENT)
Dept: FAMILY MEDICINE CLINIC | Facility: CLINIC | Age: 78
End: 2023-02-09
Payer: MEDICARE

## 2023-02-09 VITALS
OXYGEN SATURATION: 97 % | HEIGHT: 59 IN | WEIGHT: 151 LBS | HEART RATE: 71 BPM | SYSTOLIC BLOOD PRESSURE: 144 MMHG | TEMPERATURE: 98.1 F | DIASTOLIC BLOOD PRESSURE: 80 MMHG | BODY MASS INDEX: 30.44 KG/M2

## 2023-02-09 DIAGNOSIS — F32.9 REACTIVE DEPRESSION: ICD-10-CM

## 2023-02-09 DIAGNOSIS — K21.00 REFLUX ESOPHAGITIS: ICD-10-CM

## 2023-02-09 DIAGNOSIS — I10 ESSENTIAL HYPERTENSION: Primary | ICD-10-CM

## 2023-02-09 DIAGNOSIS — D64.9 ANEMIA, UNSPECIFIED TYPE: ICD-10-CM

## 2023-02-09 DIAGNOSIS — E03.9 ACQUIRED HYPOTHYROIDISM: ICD-10-CM

## 2023-02-09 DIAGNOSIS — I25.10 CORONARY ARTERY DISEASE INVOLVING NATIVE CORONARY ARTERY OF NATIVE HEART WITHOUT ANGINA PECTORIS: ICD-10-CM

## 2023-02-09 DIAGNOSIS — E78.2 MIXED HYPERLIPIDEMIA: ICD-10-CM

## 2023-02-09 PROCEDURE — 99214 OFFICE O/P EST MOD 30 MIN: CPT | Performed by: FAMILY MEDICINE

## 2023-02-09 RX ORDER — AMLODIPINE BESYLATE 2.5 MG/1
2.5 TABLET ORAL DAILY
Qty: 90 TABLET | Refills: 3 | Status: SHIPPED | OUTPATIENT
Start: 2023-02-09

## 2023-02-09 RX ORDER — ROSUVASTATIN CALCIUM 5 MG/1
5 TABLET, COATED ORAL NIGHTLY
Qty: 90 TABLET | Refills: 3 | Status: SHIPPED | OUTPATIENT
Start: 2023-02-09

## 2023-02-09 RX ORDER — OMEPRAZOLE 20 MG/1
20 CAPSULE, DELAYED RELEASE ORAL AS NEEDED
Qty: 90 CAPSULE | Refills: 3 | Status: SHIPPED | OUTPATIENT
Start: 2023-02-09

## 2023-02-09 RX ORDER — DIPHENHYDRAMINE HCL 50 MG
50 CAPSULE ORAL EVERY 6 HOURS PRN
COMMUNITY

## 2023-02-09 RX ORDER — LEVOTHYROXINE SODIUM 0.1 MG/1
100 TABLET ORAL DAILY
Qty: 90 TABLET | Refills: 3 | Status: SHIPPED | OUTPATIENT
Start: 2023-02-09 | End: 2023-02-13 | Stop reason: DRUGHIGH

## 2023-02-09 RX ORDER — DULOXETIN HYDROCHLORIDE 60 MG/1
60 CAPSULE, DELAYED RELEASE ORAL DAILY
Qty: 90 CAPSULE | Refills: 3 | Status: SHIPPED | OUTPATIENT
Start: 2023-02-09

## 2023-02-09 NOTE — PROGRESS NOTES
"Chief Complaint  Med Refill, Hypertension, Hyperlipidemia, Hypothyroidism, and Depression    Subjective        Bird Spear presents to [facility]  History of Present Illness  Patient here for 6-month checkup on her hypertension, history of coronary disease, hypothyroidism and other medical problems.  Her depression is doing well on her current medications.  She has no complaints today.  She is compliant with all her medications.  She is up-to-date with her cardiologist.  BP at home 110s-130s.        Objective   Vital Signs:  /80   Pulse 71   Temp 98.1 °F (36.7 °C)   Ht 149.9 cm (59\")   Wt 68.5 kg (151 lb)   SpO2 97%   BMI 30.50 kg/m²   Estimated body mass index is 30.5 kg/m² as calculated from the following:    Height as of this encounter: 149.9 cm (59\").    Weight as of this encounter: 68.5 kg (151 lb).             Physical Exam  Constitutional:       General: She is not in acute distress.     Appearance: Normal appearance. She is well-developed.   HENT:      Head: Normocephalic and atraumatic.      Right Ear: Tympanic membrane, ear canal and external ear normal.      Left Ear: Tympanic membrane, ear canal and external ear normal.      Mouth/Throat:      Mouth: Mucous membranes are moist.      Pharynx: Oropharynx is clear.   Eyes:      Conjunctiva/sclera: Conjunctivae normal.      Pupils: Pupils are equal, round, and reactive to light.   Neck:      Thyroid: No thyromegaly.   Cardiovascular:      Rate and Rhythm: Normal rate and regular rhythm.      Heart sounds: No murmur heard.  Pulmonary:      Effort: Pulmonary effort is normal.      Breath sounds: Normal breath sounds. No wheezing.   Abdominal:      General: Bowel sounds are normal.      Palpations: Abdomen is soft.      Tenderness: There is no abdominal tenderness.   Musculoskeletal:         General: Normal range of motion.      Cervical back: Neck supple.   Lymphadenopathy:      Cervical: No cervical adenopathy. "   Skin:     General: Skin is warm and dry.   Neurological:      Mental Status: She is alert and oriented to person, place, and time.   Psychiatric:         Mood and Affect: Mood normal.         Behavior: Behavior normal.        Result Review :                   Assessment and Plan   Diagnoses and all orders for this visit:    1. Essential hypertension (Primary)  -     Lipid Panel  -     CBC & Differential  -     Comprehensive Metabolic Panel  -     TSH  -     amLODIPine (NORVASC) 2.5 MG tablet; Take 1 tablet by mouth Daily.  Dispense: 90 tablet; Refill: 3    2. Acquired hypothyroidism  -     TSH  -     levothyroxine (Synthroid) 100 MCG tablet; Take 1 tablet by mouth Daily.  Dispense: 90 tablet; Refill: 3    3. Mixed hyperlipidemia  -     Lipid Panel  -     Comprehensive Metabolic Panel  -     rosuvastatin (Crestor) 5 MG tablet; Take 1 tablet by mouth Every Night.  Dispense: 90 tablet; Refill: 3    4. Coronary artery disease involving native coronary artery of native heart without angina pectoris  -     Lipid Panel  -     CBC & Differential  -     Comprehensive Metabolic Panel  -     TSH    5. Anemia, unspecified type  -     CBC & Differential    6. Reactive depression  -     DULoxetine (CYMBALTA) 60 MG capsule; Take 1 capsule by mouth Daily.  Dispense: 90 capsule; Refill: 3    7. Reflux esophagitis  -     omeprazole (priLOSEC) 20 MG capsule; Take 1 capsule by mouth As Needed (reflux).  Dispense: 90 capsule; Refill: 3    Hypertension-controlled, continue current medication check labs  Hypothyroidism-due for TSH  Hyperlipidemia-continue current treatment with history of coronary disease  Anemia-recheck today  Depression-continue duloxetine  Esophagitis-continue omeprazole         Follow Up   Return in about 6 months (around 8/9/2023) for Medicare Wellness.  Patient was given instructions and counseling regarding her condition or for health maintenance advice. Please see specific information pulled into the AVS if  appropriate.       Answers for HPI/ROS submitted by the patient on 2/2/2023  What is the primary reason for your visit?: Physical

## 2023-02-10 LAB
ALBUMIN SERPL-MCNC: 4.6 G/DL (ref 3.5–5.2)
ALBUMIN/GLOB SERPL: 2.4 G/DL
ALP SERPL-CCNC: 71 U/L (ref 39–117)
ALT SERPL-CCNC: 18 U/L (ref 1–33)
AST SERPL-CCNC: 24 U/L (ref 1–32)
BASOPHILS # BLD AUTO: 0.02 10*3/MM3 (ref 0–0.2)
BASOPHILS NFR BLD AUTO: 0.6 % (ref 0–1.5)
BILIRUB SERPL-MCNC: 0.6 MG/DL (ref 0–1.2)
BUN SERPL-MCNC: 16 MG/DL (ref 8–23)
BUN/CREAT SERPL: 17 (ref 7–25)
CALCIUM SERPL-MCNC: 9.4 MG/DL (ref 8.6–10.5)
CHLORIDE SERPL-SCNC: 103 MMOL/L (ref 98–107)
CHOLEST SERPL-MCNC: 172 MG/DL (ref 0–200)
CO2 SERPL-SCNC: 27.2 MMOL/L (ref 22–29)
CREAT SERPL-MCNC: 0.94 MG/DL (ref 0.57–1)
EGFRCR SERPLBLD CKD-EPI 2021: 62.2 ML/MIN/1.73
EOSINOPHIL # BLD AUTO: 0.07 10*3/MM3 (ref 0–0.4)
EOSINOPHIL NFR BLD AUTO: 2 % (ref 0.3–6.2)
ERYTHROCYTE [DISTWIDTH] IN BLOOD BY AUTOMATED COUNT: 13.1 % (ref 12.3–15.4)
GLOBULIN SER CALC-MCNC: 1.9 GM/DL
GLUCOSE SERPL-MCNC: 92 MG/DL (ref 65–99)
HCT VFR BLD AUTO: 38.4 % (ref 34–46.6)
HDLC SERPL-MCNC: 64 MG/DL (ref 40–60)
HGB BLD-MCNC: 12.7 G/DL (ref 12–15.9)
IMM GRANULOCYTES # BLD AUTO: 0.01 10*3/MM3 (ref 0–0.05)
IMM GRANULOCYTES NFR BLD AUTO: 0.3 % (ref 0–0.5)
LDLC SERPL CALC-MCNC: 93 MG/DL (ref 0–100)
LYMPHOCYTES # BLD AUTO: 0.92 10*3/MM3 (ref 0.7–3.1)
LYMPHOCYTES NFR BLD AUTO: 26.3 % (ref 19.6–45.3)
MCH RBC QN AUTO: 30.1 PG (ref 26.6–33)
MCHC RBC AUTO-ENTMCNC: 33.1 G/DL (ref 31.5–35.7)
MCV RBC AUTO: 91 FL (ref 79–97)
MONOCYTES # BLD AUTO: 0.41 10*3/MM3 (ref 0.1–0.9)
MONOCYTES NFR BLD AUTO: 11.7 % (ref 5–12)
NEUTROPHILS # BLD AUTO: 2.07 10*3/MM3 (ref 1.7–7)
NEUTROPHILS NFR BLD AUTO: 59.1 % (ref 42.7–76)
NRBC BLD AUTO-RTO: 0 /100 WBC (ref 0–0.2)
PLATELET # BLD AUTO: 228 10*3/MM3 (ref 140–450)
POTASSIUM SERPL-SCNC: 4.8 MMOL/L (ref 3.5–5.2)
PROT SERPL-MCNC: 6.5 G/DL (ref 6–8.5)
RBC # BLD AUTO: 4.22 10*6/MM3 (ref 3.77–5.28)
SODIUM SERPL-SCNC: 141 MMOL/L (ref 136–145)
TRIGL SERPL-MCNC: 79 MG/DL (ref 0–150)
TSH SERPL DL<=0.005 MIU/L-ACNC: 0.02 UIU/ML (ref 0.27–4.2)
VLDLC SERPL CALC-MCNC: 15 MG/DL (ref 5–40)
WBC # BLD AUTO: 3.5 10*3/MM3 (ref 3.4–10.8)

## 2023-02-13 DIAGNOSIS — E03.9 ACQUIRED HYPOTHYROIDISM: Primary | ICD-10-CM

## 2023-02-13 RX ORDER — LEVOTHYROXINE SODIUM 88 UG/1
88 TABLET ORAL DAILY
Qty: 90 TABLET | Refills: 1 | Status: SHIPPED | OUTPATIENT
Start: 2023-02-13 | End: 2023-02-14 | Stop reason: SDUPTHER

## 2023-02-14 DIAGNOSIS — E03.9 ACQUIRED HYPOTHYROIDISM: ICD-10-CM

## 2023-02-14 RX ORDER — LEVOTHYROXINE SODIUM 88 UG/1
88 TABLET ORAL DAILY
Qty: 90 TABLET | Refills: 1 | Status: SHIPPED | OUTPATIENT
Start: 2023-02-14

## 2023-02-17 DIAGNOSIS — F32.9 REACTIVE DEPRESSION: ICD-10-CM

## 2023-02-17 RX ORDER — AMITRIPTYLINE HYDROCHLORIDE 25 MG/1
25 TABLET, FILM COATED ORAL EVERY EVENING
Qty: 90 TABLET | Refills: 3 | Status: SHIPPED | OUTPATIENT
Start: 2023-02-17

## 2023-05-10 DIAGNOSIS — E03.9 ACQUIRED HYPOTHYROIDISM: ICD-10-CM

## 2023-05-11 LAB — TSH SERPL DL<=0.005 MIU/L-ACNC: 0.12 UIU/ML (ref 0.27–4.2)

## 2023-08-15 ENCOUNTER — OFFICE VISIT (OUTPATIENT)
Dept: FAMILY MEDICINE CLINIC | Facility: CLINIC | Age: 78
End: 2023-08-15
Payer: MEDICARE

## 2023-08-15 VITALS
HEIGHT: 59 IN | OXYGEN SATURATION: 99 % | BODY MASS INDEX: 30.48 KG/M2 | SYSTOLIC BLOOD PRESSURE: 114 MMHG | TEMPERATURE: 97.7 F | DIASTOLIC BLOOD PRESSURE: 64 MMHG | WEIGHT: 151.2 LBS | HEART RATE: 72 BPM

## 2023-08-15 DIAGNOSIS — T46.6X5A MYALGIA DUE TO STATIN: ICD-10-CM

## 2023-08-15 DIAGNOSIS — E78.2 MIXED HYPERLIPIDEMIA: ICD-10-CM

## 2023-08-15 DIAGNOSIS — M79.10 MYALGIA DUE TO STATIN: ICD-10-CM

## 2023-08-15 DIAGNOSIS — F32.9 REACTIVE DEPRESSION: ICD-10-CM

## 2023-08-15 DIAGNOSIS — F11.90 OPIOID USE: ICD-10-CM

## 2023-08-15 DIAGNOSIS — E03.9 ACQUIRED HYPOTHYROIDISM: ICD-10-CM

## 2023-08-15 DIAGNOSIS — I10 ESSENTIAL HYPERTENSION: ICD-10-CM

## 2023-08-15 DIAGNOSIS — Z79.899 CURRENT USE OF PROTON PUMP INHIBITOR: ICD-10-CM

## 2023-08-15 DIAGNOSIS — Z00.00 MEDICARE ANNUAL WELLNESS VISIT, SUBSEQUENT: Primary | ICD-10-CM

## 2023-08-15 DIAGNOSIS — I25.10 CORONARY ARTERY DISEASE INVOLVING NATIVE CORONARY ARTERY OF NATIVE HEART WITHOUT ANGINA PECTORIS: ICD-10-CM

## 2023-08-15 LAB
ALBUMIN SERPL-MCNC: 4.5 G/DL (ref 3.5–5.2)
ALBUMIN/GLOB SERPL: 2.4 G/DL
ALP SERPL-CCNC: 55 U/L (ref 39–117)
ALT SERPL-CCNC: 19 U/L (ref 1–33)
AST SERPL-CCNC: 22 U/L (ref 1–32)
BASOPHILS # BLD AUTO: 0.03 10*3/MM3 (ref 0–0.2)
BASOPHILS NFR BLD AUTO: 0.7 % (ref 0–1.5)
BILIRUB SERPL-MCNC: 0.5 MG/DL (ref 0–1.2)
BUN SERPL-MCNC: 22 MG/DL (ref 8–23)
BUN/CREAT SERPL: 21.6 (ref 7–25)
CALCIUM SERPL-MCNC: 9.7 MG/DL (ref 8.6–10.5)
CHLORIDE SERPL-SCNC: 102 MMOL/L (ref 98–107)
CHOLEST SERPL-MCNC: 223 MG/DL (ref 0–200)
CK SERPL-CCNC: 101 U/L (ref 20–180)
CO2 SERPL-SCNC: 27.6 MMOL/L (ref 22–29)
CREAT SERPL-MCNC: 1.02 MG/DL (ref 0.57–1)
EGFRCR SERPLBLD CKD-EPI 2021: 56.4 ML/MIN/1.73
EOSINOPHIL # BLD AUTO: 0.14 10*3/MM3 (ref 0–0.4)
EOSINOPHIL NFR BLD AUTO: 3.5 % (ref 0.3–6.2)
ERYTHROCYTE [DISTWIDTH] IN BLOOD BY AUTOMATED COUNT: 13.3 % (ref 12.3–15.4)
GLOBULIN SER CALC-MCNC: 1.9 GM/DL
GLUCOSE SERPL-MCNC: 89 MG/DL (ref 65–99)
HCT VFR BLD AUTO: 37.2 % (ref 34–46.6)
HDLC SERPL-MCNC: 69 MG/DL (ref 40–60)
HGB BLD-MCNC: 12.4 G/DL (ref 12–15.9)
IMM GRANULOCYTES # BLD AUTO: 0.01 10*3/MM3 (ref 0–0.05)
IMM GRANULOCYTES NFR BLD AUTO: 0.2 % (ref 0–0.5)
LDLC SERPL CALC-MCNC: 136 MG/DL (ref 0–100)
LYMPHOCYTES # BLD AUTO: 0.87 10*3/MM3 (ref 0.7–3.1)
LYMPHOCYTES NFR BLD AUTO: 21.6 % (ref 19.6–45.3)
MCH RBC QN AUTO: 30.5 PG (ref 26.6–33)
MCHC RBC AUTO-ENTMCNC: 33.3 G/DL (ref 31.5–35.7)
MCV RBC AUTO: 91.4 FL (ref 79–97)
MONOCYTES # BLD AUTO: 0.48 10*3/MM3 (ref 0.1–0.9)
MONOCYTES NFR BLD AUTO: 11.9 % (ref 5–12)
NEUTROPHILS # BLD AUTO: 2.5 10*3/MM3 (ref 1.7–7)
NEUTROPHILS NFR BLD AUTO: 62.1 % (ref 42.7–76)
NRBC BLD AUTO-RTO: 0 /100 WBC (ref 0–0.2)
PLATELET # BLD AUTO: 238 10*3/MM3 (ref 140–450)
POTASSIUM SERPL-SCNC: 3.9 MMOL/L (ref 3.5–5.2)
PROT SERPL-MCNC: 6.4 G/DL (ref 6–8.5)
RBC # BLD AUTO: 4.07 10*6/MM3 (ref 3.77–5.28)
SODIUM SERPL-SCNC: 140 MMOL/L (ref 136–145)
TRIGL SERPL-MCNC: 100 MG/DL (ref 0–150)
TSH SERPL DL<=0.005 MIU/L-ACNC: 1.03 UIU/ML (ref 0.27–4.2)
VIT B12 SERPL-MCNC: 1347 PG/ML (ref 211–946)
VLDLC SERPL CALC-MCNC: 18 MG/DL (ref 5–40)
WBC # BLD AUTO: 4.03 10*3/MM3 (ref 3.4–10.8)

## 2023-08-15 NOTE — PROGRESS NOTES
The ABCs of the Annual Wellness Visit  Subsequent Medicare Wellness Visit    Subjective    Bird Spear is a 77 y.o. female who presents for a Subsequent Medicare Wellness Visit.    The following portions of the patient's history were reviewed and   updated as appropriate: allergies, current medications, past family history, past medical history, past social history, past surgical history, and problem list.    Compared to one year ago, the patient feels her physical   health is worse. Back hurting some more.     Compared to one year ago, the patient feels her mental   health is the same.    Recent Hospitalizations:  She was not admitted to the hospital during the last year.       Current Medical Providers:  Patient Care Team:  Kehrer, Meredith Lea, MD as PCP - General (Family Medicine)  Armond Kilpatrick MD as Consulting Physician (Orthopedic Surgery)  Giulia Squires MD as Consulting Physician (Cardiology)  Fortino Sánchez MD as Consulting Physician (Medical Oncology)  Kb Brooks MD as Consulting Physician (Gastroenterology)  Sandro Madison MD as Consulting Physician (Pain Medicine)    Outpatient Medications Prior to Visit   Medication Sig Dispense Refill    amitriptyline (ELAVIL) 25 MG tablet Take 1 tablet by mouth Every Evening. 90 tablet 3    amLODIPine (NORVASC) 2.5 MG tablet Take 1 tablet by mouth Daily. 90 tablet 3    aspirin 325 MG EC tablet Take 1 tablet by mouth Daily.      Calcium Carbonate 1500 (600 Ca) MG tablet Take 1 tablet by mouth 2 (Two) Times a Day With Meals.      Coenzyme Q10 (COQ10) 200 MG capsule Take 1 capsule by mouth Daily.      diphenhydrAMINE (BENADRYL) 50 MG capsule Take 1 capsule by mouth Every 6 (Six) Hours As Needed for Itching.      DULoxetine (CYMBALTA) 60 MG capsule Take 1 capsule by mouth Daily. 90 capsule 3    HYDROcodone-acetaminophen (NORCO) 7.5-325 MG per tablet Take 1 tablet by mouth Every 6 (Six) Hours As Needed.      levothyroxine (SYNTHROID,  LEVOTHROID) 88 MCG tablet TAKE 1 TABLET BY MOUTH DAILY 90 tablet 3    metoprolol tartrate (LOPRESSOR) 50 MG tablet Take 1.5 tablets by mouth 2 (Two) Times a Day. 270 tablet 3    Multiple Vitamin (MULTI VITAMIN) tablet Take  by mouth.      omeprazole (priLOSEC) 20 MG capsule Take 1 capsule by mouth As Needed (reflux). 90 capsule 3    polyethylene glycol (MIRALAX) packet Take 8.5 g by mouth As Needed.      rosuvastatin (Crestor) 5 MG tablet Take 1 tablet by mouth Every Night. 90 tablet 3    cetirizine (zyrTEC) 10 MG tablet Take 10 mg by mouth As Needed for Allergies. (Patient not taking: Reported on 8/15/2023)       No facility-administered medications prior to visit.       Opioid medication/s are on active medication list.  and I have evaluated her active treatment plan and pain score trends (see table).  There were no vitals filed for this visit.  I have reviewed the chart for potential of high risk medication and harmful drug interactions in the elderly.          Aspirin is on active medication list. Aspirin use is indicated based on review of current medical condition/s. Pros and cons of this therapy have been discussed today. Benefits of this medication outweigh potential harm.  Patient has been encouraged to continue taking this medication.  .      Patient Active Problem List   Diagnosis    Chronic pain syndrome    Arthritis    Essential hypertension    Hypothyroidism    Mixed hyperlipidemia    Reflux esophagitis    Abnormal electrocardiogram    Degenerative spondylolisthesis    Spinal stenosis of lumbar region    Thoracic aortic aneurysm    Greater trochanteric bursitis of both hips    Tendinopathy of rotator cuff, right    Acromioclavicular joint arthritis    CKD (chronic kidney disease) stage 3, GFR 30-59 ml/min    Hiatal hernia    Coronary artery disease involving native coronary artery of native heart without angina pectoris    Reactive depression    Bradycardia    Renal atrophy, bilateral     Advance Care  "Planning   Advance Care Planning     Advance Directive is on file.  ACP discussion was held with the patient during this visit. Patient has an advance directive in EMR which is still valid.      Objective    Vitals:    08/15/23 0944   BP: 114/64   Pulse: 72   Temp: 97.7 øF (36.5 øC)   SpO2: 99%   Weight: 68.6 kg (151 lb 3.2 oz)   Height: 149.9 cm (59\")     Estimated body mass index is 30.54 kg/mý as calculated from the following:    Height as of this encounter: 149.9 cm (59\").    Weight as of this encounter: 68.6 kg (151 lb 3.2 oz).    BMI is >= 30 and <35. (Class 1 Obesity). The following options were offered after discussion;: nutrition counseling/recommendations      Does the patient have evidence of cognitive impairment? No          HEALTH RISK ASSESSMENT    Smoking Status:  Social History     Tobacco Use   Smoking Status Never   Smokeless Tobacco Never     Alcohol Consumption:  Social History     Substance and Sexual Activity   Alcohol Use No    Comment: caffeine use     Fall Risk Screen:    STEADI Fall Risk Assessment was completed, and patient is at LOW risk for falls.Assessment completed on:8/15/2023    Depression Screenin/15/2023     9:43 AM   PHQ-2/PHQ-9 Depression Screening   Little Interest or Pleasure in Doing Things 0-->not at all   Feeling Down, Depressed or Hopeless 0-->not at all   PHQ-9: Brief Depression Severity Measure Score 0       Health Habits and Functional and Cognitive Screenin/15/2023     9:43 AM   Functional & Cognitive Status   Do you have difficulty preparing food and eating? No   Do you have difficulty bathing yourself, getting dressed or grooming yourself? No   Do you have difficulty using the toilet? No   Do you have difficulty moving around from place to place? No   Do you have trouble with steps or getting out of a bed or a chair? Yes   Current Diet Well Balanced Diet   Dental Exam Up to date   Eye Exam Up to date   Exercise (times per week) 2 times per week "   Current Exercises Include Walking;House Cleaning   Do you need help using the phone?  No   Are you deaf or do you have serious difficulty hearing?  No   Do you need help to go to places out of walking distance? No   Do you need help shopping? No   Do you need help preparing meals?  No   Do you need help with housework?  No   Do you need help with laundry? No   Do you need help taking your medications? No   Do you need help managing money? No   Do you ever drive or ride in a car without wearing a seat belt? No       Age-appropriate Screening Schedule:  Refer to the list below for future screening recommendations based on patient's age, sex and/or medical conditions. Orders for these recommended tests are listed in the plan section. The patient has been provided with a written plan.    Health Maintenance   Topic Date Due    TDAP/TD VACCINES (2 - Td or Tdap) 04/05/2021    COVID-19 Vaccine (5 - Moderna series) 02/03/2023    ANNUAL WELLNESS VISIT  08/09/2023    INFLUENZA VACCINE  10/01/2023    DXA SCAN  11/01/2023    COLORECTAL CANCER SCREENING  11/19/2023    LIPID PANEL  02/09/2024    HEPATITIS C SCREENING  Completed    Pneumococcal Vaccine 65+  Completed    ZOSTER VACCINE  Completed                  CMS Preventative Services Quick Reference  Risk Factors Identified During Encounter  Chronic Pain:  sees pain management  The above risks/problems have been discussed with the patient.  Pertinent information has been shared with the patient in the After Visit Summary.  An After Visit Summary and PPPS were made available to the patient.    Follow Up:   Next Medicare Wellness visit to be scheduled in 1 year.       Additional E&M Note during same encounter follows:  Patient has multiple medical problems which are significant and separately identifiable that require additional work above and beyond the Medicare Wellness Visit.      Chief Complaint  Medicare Wellness-subsequent, Hypertension, Hyperlipidemia, Hypothyroidism,  "and Depression    Subjective        HPI  Bird Spear is also being seen today for follow-up on hypothyroidism, hypertension, hyperlipidemia, depression and coronary disease.  She feels she is doing well with her depression.  She does get down about her worsening pain.  She is still seeing pain management.  Having a lot of pain with her Crestor.  She has failed many different statins.  Her pain in her muscles does get better when she stops it for a few days.         Objective   Vital Signs:  /64   Pulse 72   Temp 97.7 øF (36.5 øC)   Ht 149.9 cm (59\")   Wt 68.6 kg (151 lb 3.2 oz)   SpO2 99%   BMI 30.54 kg/mý     Physical Exam  Vitals and nursing note reviewed.   Constitutional:       General: She is not in acute distress.     Appearance: Normal appearance. She is well-developed.   HENT:      Head: Normocephalic and atraumatic.      Right Ear: Tympanic membrane, ear canal and external ear normal.      Left Ear: Tympanic membrane, ear canal and external ear normal.      Nose: Nose normal.      Mouth/Throat:      Mouth: Mucous membranes are moist.      Pharynx: Oropharynx is clear. No oropharyngeal exudate or posterior oropharyngeal erythema.   Eyes:      Conjunctiva/sclera: Conjunctivae normal.      Pupils: Pupils are equal, round, and reactive to light.   Neck:      Thyroid: No thyromegaly.   Cardiovascular:      Rate and Rhythm: Normal rate and regular rhythm.      Heart sounds: No murmur heard.  Pulmonary:      Effort: Pulmonary effort is normal.      Breath sounds: Normal breath sounds. No wheezing.   Abdominal:      General: Abdomen is flat. Bowel sounds are normal. There is no distension.      Palpations: Abdomen is soft. There is no mass.      Tenderness: There is no abdominal tenderness.      Hernia: No hernia is present.   Musculoskeletal:         General: No swelling. Normal range of motion.      Cervical back: Normal range of motion and neck supple.      Right lower leg: No edema.      Left " lower leg: No edema.   Lymphadenopathy:      Cervical: No cervical adenopathy.   Skin:     General: Skin is warm and dry.      Capillary Refill: Capillary refill takes less than 2 seconds.      Findings: No rash.   Neurological:      General: No focal deficit present.      Mental Status: She is alert and oriented to person, place, and time.      Cranial Nerves: No cranial nerve deficit.   Psychiatric:         Mood and Affect: Mood normal.         Behavior: Behavior normal.                       Assessment and Plan   Diagnoses and all orders for this visit:    1. Medicare annual wellness visit, subsequent (Primary)    2. Essential hypertension  -     Lipid Panel  -     CBC & Differential  -     Comprehensive Metabolic Panel  -     TSH    3. Mixed hyperlipidemia  -     Lipid Panel  -     CBC & Differential  -     Comprehensive Metabolic Panel    4. Acquired hypothyroidism  -     TSH    5. Coronary artery disease involving native coronary artery of native heart without angina pectoris  -     Lipid Panel  -     CBC & Differential  -     Comprehensive Metabolic Panel  -     TSH    6. Opioid use    7. Reactive depression    8. Myalgia due to statin  -     CK    9. Current use of proton pump inhibitor  -     Vitamin B12    Hypertension-well-controlled, continue current medication check labs  Hyperlipidemia-recheck today  Hypothyroidism-due for TSH  Coronary disease-up-to-date with cardiology  Depression-stable  Myalgias-advised patient to try her Crestor every 2 to 3 days and see which she tolerates.  Patient is agreeable to do so since she has had a lot of problems with different statins.  Routine health maintenance-up-to-date       Follow Up   Return in about 6 months (around 2/15/2024) for Recheck.  Patient was given instructions and counseling regarding her condition or for health maintenance advice. Please see specific information pulled into the AVS if appropriate.

## 2023-09-01 DIAGNOSIS — I71.21 ANEURYSM OF ASCENDING AORTA WITHOUT RUPTURE: Primary | ICD-10-CM

## 2023-09-29 ENCOUNTER — HOSPITAL ENCOUNTER (OUTPATIENT)
Dept: CT IMAGING | Facility: HOSPITAL | Age: 78
Discharge: HOME | End: 2023-09-29
Admitting: NURSE PRACTITIONER
Payer: MEDICARE

## 2023-09-29 DIAGNOSIS — I71.21 ANEURYSM OF ASCENDING AORTA WITHOUT RUPTURE: ICD-10-CM

## 2023-09-29 PROCEDURE — 71250 CT THORAX DX C-: CPT

## 2023-10-03 ENCOUNTER — TELEPHONE (OUTPATIENT)
Dept: CARDIAC SURGERY | Facility: CLINIC | Age: 78
End: 2023-10-03
Payer: MEDICARE

## 2023-10-03 NOTE — TELEPHONE ENCOUNTER
Called and spoke with patient. On the recall list and is due for follow up appointment. Patient stated will call [employer] prior to scheduling follow up appointment. Patient to return call.

## 2023-10-19 ENCOUNTER — TELEPHONE (OUTPATIENT)
Dept: CARDIAC SURGERY | Facility: CLINIC | Age: 78
End: 2023-10-19
Payer: MEDICARE

## 2023-10-20 DIAGNOSIS — I10 ESSENTIAL HYPERTENSION: ICD-10-CM

## 2023-10-20 NOTE — TELEPHONE ENCOUNTER
Caller: Bird Spear    Relationship: Self    Best call back number: [phone]     Requested Prescriptions:   Requested Prescriptions     Pending Prescriptions Disp Refills    metoprolol tartrate (LOPRESSOR) 50 MG tablet 270 tablet 3     Sig: Take 1.5 tablets by mouth 2 (Two) Times a Day.        Pharmacy where request should be sent: [address] [phone] [facility] [phone]      Last office visit with prescribing clinician: 8/15/2023   Last telemedicine visit with prescribing clinician: Visit date not found   Next office visit with prescribing clinician: 2/15/2024     Does the patient have less than a 3 day supply:  [x] Yes  [] No    Would you like a call back once the refill request has been completed: [x] Yes [] No    If the office needs to give you a call back, can they leave a voicemail: [] Yes [x] No    Cameron Correa Rep   10/20/23 10:33 EDT

## 2023-10-23 RX ORDER — METOPROLOL TARTRATE 50 MG/1
75 TABLET, FILM COATED ORAL 2 TIMES DAILY
Qty: 270 TABLET | Refills: 3 | Status: SHIPPED | OUTPATIENT
Start: 2023-10-23

## 2023-12-06 DIAGNOSIS — E78.2 MIXED HYPERLIPIDEMIA: ICD-10-CM

## 2023-12-07 RX ORDER — ROSUVASTATIN CALCIUM 5 MG/1
5 TABLET, COATED ORAL
Qty: 90 TABLET | Refills: 1 | Status: SHIPPED | OUTPATIENT
Start: 2023-12-07

## 2023-12-19 ENCOUNTER — APPOINTMENT (OUTPATIENT)
Dept: WOMENS IMAGING | Facility: HOSPITAL | Age: 78
End: 2023-12-19
Payer: MEDICARE

## 2023-12-19 PROCEDURE — 77067 SCR MAMMO BI INCL CAD: CPT | Performed by: RADIOLOGY

## 2023-12-19 PROCEDURE — 77063 BREAST TOMOSYNTHESIS BI: CPT | Performed by: RADIOLOGY

## 2024-01-18 DIAGNOSIS — F32.9 REACTIVE DEPRESSION: ICD-10-CM

## 2024-01-18 DIAGNOSIS — I10 ESSENTIAL HYPERTENSION: ICD-10-CM

## 2024-01-18 RX ORDER — AMLODIPINE BESYLATE 2.5 MG/1
2.5 TABLET ORAL DAILY
Qty: 90 TABLET | Refills: 3 | Status: SHIPPED | OUTPATIENT
Start: 2024-01-18

## 2024-01-18 RX ORDER — DULOXETIN HYDROCHLORIDE 60 MG/1
60 CAPSULE, DELAYED RELEASE ORAL DAILY
Qty: 90 CAPSULE | Refills: 3 | Status: SHIPPED | OUTPATIENT
Start: 2024-01-18

## 2025-01-17 DIAGNOSIS — E78.2 MIXED HYPERLIPIDEMIA: ICD-10-CM

## 2025-01-17 RX ORDER — ROSUVASTATIN CALCIUM 5 MG/1
5 TABLET, COATED ORAL
Qty: 90 TABLET | Refills: 3 | OUTPATIENT
Start: 2025-01-17

## 2025-02-05 DIAGNOSIS — E78.2 MIXED HYPERLIPIDEMIA: ICD-10-CM

## 2025-02-05 RX ORDER — ROSUVASTATIN CALCIUM 5 MG/1
5 TABLET, COATED ORAL
Qty: 90 TABLET | Refills: 3 | OUTPATIENT
Start: 2025-02-05